# Patient Record
Sex: MALE | Race: WHITE | NOT HISPANIC OR LATINO | Employment: PART TIME | URBAN - NONMETROPOLITAN AREA
[De-identification: names, ages, dates, MRNs, and addresses within clinical notes are randomized per-mention and may not be internally consistent; named-entity substitution may affect disease eponyms.]

---

## 2017-04-05 ENCOUNTER — COMMUNICATION - GICH (OUTPATIENT)
Dept: FAMILY MEDICINE | Facility: OTHER | Age: 47
End: 2017-04-05

## 2017-04-05 DIAGNOSIS — M51.26 OTHER INTERVERTEBRAL DISC DISPLACEMENT, LUMBAR REGION: ICD-10-CM

## 2017-04-05 DIAGNOSIS — K59.01 SLOW TRANSIT CONSTIPATION: ICD-10-CM

## 2017-10-19 ENCOUNTER — COMMUNICATION - GICH (OUTPATIENT)
Dept: FAMILY MEDICINE | Facility: OTHER | Age: 47
End: 2017-10-19

## 2017-10-19 ENCOUNTER — OFFICE VISIT - GICH (OUTPATIENT)
Dept: FAMILY MEDICINE | Facility: OTHER | Age: 47
End: 2017-10-19

## 2017-10-19 ENCOUNTER — HISTORY (OUTPATIENT)
Dept: FAMILY MEDICINE | Facility: OTHER | Age: 47
End: 2017-10-19

## 2017-10-19 DIAGNOSIS — Z72.0 TOBACCO USE: ICD-10-CM

## 2017-10-19 DIAGNOSIS — K59.01 SLOW TRANSIT CONSTIPATION: ICD-10-CM

## 2017-10-19 DIAGNOSIS — K40.20 BILATERAL INGUINAL HERNIA WITHOUT OBSTRUCTION OR GANGRENE: ICD-10-CM

## 2017-10-19 DIAGNOSIS — M51.26 OTHER INTERVERTEBRAL DISC DISPLACEMENT, LUMBAR REGION: ICD-10-CM

## 2017-10-19 DIAGNOSIS — Z23 ENCOUNTER FOR IMMUNIZATION: ICD-10-CM

## 2017-10-19 LAB
ABSOLUTE BASOPHILS - HISTORICAL: 0 THOU/CU MM
ABSOLUTE EOSINOPHILS - HISTORICAL: 0.3 THOU/CU MM
ABSOLUTE IMMATURE GRANULOCYTES(METAS,MYELOS,PROS) - HISTORICAL: 0.1 THOU/CU MM
ABSOLUTE LYMPHOCYTES - HISTORICAL: 2.2 THOU/CU MM (ref 0.9–2.9)
ABSOLUTE MONOCYTES - HISTORICAL: 0.7 THOU/CU MM
ABSOLUTE NEUTROPHILS - HISTORICAL: 9.6 THOU/CU MM (ref 1.7–7)
ANION GAP - HISTORICAL: 7 (ref 5–18)
BASOPHILS # BLD AUTO: 0.2 %
BUN SERPL-MCNC: 13 MG/DL (ref 7–25)
BUN/CREAT RATIO - HISTORICAL: 13
CALCIUM SERPL-MCNC: 9.6 MG/DL (ref 8.6–10.3)
CHLORIDE SERPLBLD-SCNC: 102 MMOL/L (ref 98–107)
CO2 SERPL-SCNC: 26 MMOL/L (ref 21–31)
CREAT SERPL-MCNC: 0.99 MG/DL (ref 0.7–1.3)
EOSINOPHIL NFR BLD AUTO: 2 %
ERYTHROCYTE [DISTWIDTH] IN BLOOD BY AUTOMATED COUNT: 13 % (ref 11.5–15.5)
GFR IF NOT AFRICAN AMERICAN - HISTORICAL: >60 ML/MIN/1.73M2
GLUCOSE SERPL-MCNC: 330 MG/DL (ref 70–105)
HCT VFR BLD AUTO: 43.7 % (ref 37–53)
HEMOGLOBIN: 14.9 G/DL (ref 13.5–17.5)
IMMATURE GRANULOCYTES(METAS,MYELOS,PROS) - HISTORICAL: 0.4 %
LYMPHOCYTES NFR BLD AUTO: 17.4 % (ref 20–44)
MCH RBC QN AUTO: 29.6 PG (ref 26–34)
MCHC RBC AUTO-ENTMCNC: 34.1 G/DL (ref 32–36)
MCV RBC AUTO: 87 FL (ref 80–100)
MONOCYTES NFR BLD AUTO: 5.2 %
NEUTROPHILS NFR BLD AUTO: 74.8 % (ref 42–72)
PLATELET # BLD AUTO: 333 THOU/CU MM (ref 140–440)
PMV BLD: 8.8 FL (ref 6.5–11)
POTASSIUM SERPL-SCNC: 4.8 MMOL/L (ref 3.5–5.1)
RED BLOOD COUNT - HISTORICAL: 5.03 MIL/CU MM (ref 4.3–5.9)
SODIUM SERPL-SCNC: 135 MMOL/L (ref 133–143)
WHITE BLOOD COUNT - HISTORICAL: 12.8 THOU/CU MM (ref 4.5–11)

## 2017-10-19 ASSESSMENT — ANXIETY QUESTIONNAIRES
3. WORRYING TOO MUCH ABOUT DIFFERENT THINGS: NEARLY EVERY DAY
7. FEELING AFRAID AS IF SOMETHING AWFUL MIGHT HAPPEN: NEARLY EVERY DAY
4. TROUBLE RELAXING: NOT AT ALL
2. NOT BEING ABLE TO STOP OR CONTROL WORRYING: NOT AT ALL
GAD7 TOTAL SCORE: 12
6. BECOMING EASILY ANNOYED OR IRRITABLE: NEARLY EVERY DAY
1. FEELING NERVOUS, ANXIOUS, OR ON EDGE: NEARLY EVERY DAY
5. BEING SO RESTLESS THAT IT IS HARD TO SIT STILL: NOT AT ALL

## 2017-10-19 ASSESSMENT — PATIENT HEALTH QUESTIONNAIRE - PHQ9: SUM OF ALL RESPONSES TO PHQ QUESTIONS 1-9: 17

## 2017-10-20 ENCOUNTER — HISTORY (OUTPATIENT)
Dept: FAMILY MEDICINE | Facility: OTHER | Age: 47
End: 2017-10-20

## 2017-10-20 ENCOUNTER — OFFICE VISIT - GICH (OUTPATIENT)
Dept: FAMILY MEDICINE | Facility: OTHER | Age: 47
End: 2017-10-20

## 2017-10-20 DIAGNOSIS — E11.65 TYPE 2 DIABETES MELLITUS WITH HYPERGLYCEMIA (H): ICD-10-CM

## 2017-10-20 DIAGNOSIS — R73.9 HYPERGLYCEMIA: ICD-10-CM

## 2017-10-20 LAB
ESTIMATED AVERAGE GLUCOSE: 194 MG/DL
GLUCOSE SERPL-MCNC: 231 MG/DL (ref 70–105)
HEMOGLOBIN A1C MONITORING (POCT) - HISTORICAL: 8.4 % (ref 4–6.2)

## 2017-10-25 ENCOUNTER — OFFICE VISIT - GICH (OUTPATIENT)
Dept: SURGERY | Facility: OTHER | Age: 47
End: 2017-10-25

## 2017-10-25 ENCOUNTER — HISTORY (OUTPATIENT)
Dept: SURGERY | Facility: OTHER | Age: 47
End: 2017-10-25

## 2017-10-25 ENCOUNTER — COMMUNICATION - GICH (OUTPATIENT)
Dept: SURGERY | Facility: OTHER | Age: 47
End: 2017-10-25

## 2017-10-25 DIAGNOSIS — K59.09 OTHER CONSTIPATION: ICD-10-CM

## 2017-10-25 DIAGNOSIS — K62.5 HEMORRHAGE OF ANUS AND RECTUM: ICD-10-CM

## 2017-10-25 DIAGNOSIS — K40.20 BILATERAL INGUINAL HERNIA WITHOUT OBSTRUCTION OR GANGRENE: ICD-10-CM

## 2017-10-31 ENCOUNTER — AMBULATORY - GICH (OUTPATIENT)
Dept: EDUCATION SERVICES | Facility: OTHER | Age: 47
End: 2017-10-31

## 2017-10-31 ENCOUNTER — HISTORY (OUTPATIENT)
Dept: PEDIATRICS | Facility: OTHER | Age: 47
End: 2017-10-31

## 2017-10-31 ENCOUNTER — OFFICE VISIT - GICH (OUTPATIENT)
Dept: PEDIATRICS | Facility: OTHER | Age: 47
End: 2017-10-31

## 2017-10-31 DIAGNOSIS — E11.65 TYPE 2 DIABETES MELLITUS WITH HYPERGLYCEMIA (H): ICD-10-CM

## 2017-10-31 DIAGNOSIS — Z23 ENCOUNTER FOR IMMUNIZATION: ICD-10-CM

## 2017-10-31 DIAGNOSIS — Z76.89 PERSONS ENCOUNTERING HEALTH SERVICES IN OTHER SPECIFIED CIRCUMSTANCES: ICD-10-CM

## 2017-10-31 DIAGNOSIS — E11.9 TYPE 2 DIABETES MELLITUS WITHOUT COMPLICATIONS (H): ICD-10-CM

## 2017-10-31 ASSESSMENT — ANXIETY QUESTIONNAIRES
2. NOT BEING ABLE TO STOP OR CONTROL WORRYING: NOT AT ALL
GAD7 TOTAL SCORE: 12
7. FEELING AFRAID AS IF SOMETHING AWFUL MIGHT HAPPEN: NEARLY EVERY DAY
3. WORRYING TOO MUCH ABOUT DIFFERENT THINGS: NEARLY EVERY DAY
5. BEING SO RESTLESS THAT IT IS HARD TO SIT STILL: NOT AT ALL
6. BECOMING EASILY ANNOYED OR IRRITABLE: NEARLY EVERY DAY
4. TROUBLE RELAXING: NOT AT ALL
1. FEELING NERVOUS, ANXIOUS, OR ON EDGE: NEARLY EVERY DAY

## 2017-10-31 ASSESSMENT — PATIENT HEALTH QUESTIONNAIRE - PHQ9: SUM OF ALL RESPONSES TO PHQ QUESTIONS 1-9: 17

## 2017-12-07 ENCOUNTER — OFFICE VISIT - GICH (OUTPATIENT)
Dept: PEDIATRICS | Facility: OTHER | Age: 47
End: 2017-12-07

## 2017-12-07 ENCOUNTER — HISTORY (OUTPATIENT)
Dept: PEDIATRICS | Facility: OTHER | Age: 47
End: 2017-12-07

## 2017-12-07 DIAGNOSIS — E11.65 TYPE 2 DIABETES MELLITUS WITH HYPERGLYCEMIA (H): ICD-10-CM

## 2017-12-07 ASSESSMENT — PATIENT HEALTH QUESTIONNAIRE - PHQ9: SUM OF ALL RESPONSES TO PHQ QUESTIONS 1-9: 17

## 2017-12-11 ENCOUNTER — SURGERY (OUTPATIENT)
Dept: SURGERY | Facility: OTHER | Age: 47
End: 2017-12-11

## 2017-12-12 ENCOUNTER — SURGERY (OUTPATIENT)
Dept: SURGERY | Facility: OTHER | Age: 47
End: 2017-12-12

## 2017-12-27 NOTE — PROGRESS NOTES
"Patient Information     Patient Name MRN Rick Pulido [id number] Male 1970      Progress Notes by Vilma Bowden MD at 10/20/2017  9:30 AM     Author:  Vilma Bowden MD Service:  (none) Author Type:  Physician     Filed:  10/20/2017 12:48 PM Encounter Date:  10/20/2017 Status:  Signed     :  Vilma Bowden MD (Physician)            SUBJECTIVE:  47 y.o. male returns for followup after being seen yesterday with an incidentally noted random blood glucose of 330. He used his father's supplies to check his BS at home with a value in the low 200s this morning. He has no previous Hx of diabetes, but his father has a long Hx of diabetes and patient is somewhat familiar with testing and medications. Patient has noted some increased thirst and \"eats a lot\" but has had gradual weight loss.       Current Outpatient Prescriptions       Medication  Sig Dispense Refill     meloxicam 15 mg tablet Take 1 tablet by mouth once daily. 90 tablet 0     polyethylene glycoL (MIRALAX) 17 gram/dose powder Take 17 g by mouth 2 times daily. 1054 g 1     varenicline (CHANTIX STARTING MONTH BOX) 0.5 mg (11)- 1 mg (42) tablet Take one 0.5mg tab daily for 3 days, then one 0.5mg tab twice daily for 4 days, then one 1mg tab twice daily. 1 Packet 0     varenicline (CHANTIX) 1 mg tablet Take 1 mg by mouth 2 times daily with meals. 60 tablet 2     No current facility-administered medications for this visit.      Medications have been reviewed by me and are current to the best of my knowledge and ability.      Allergies as of 10/20/2017      (No Known Allergies)        OBJECTIVE:  /68  Pulse 80  Wt 59.2 kg (130 lb 9.6 oz)  BMI 19.86 kg/m2    General Appearance: alert, thin, does not appear acutely ill.    LAB:  Results for orders placed or performed in visit on 10/20/17      GLUCOSE, RANDOM      Result  Value Ref Range    GLUCOSE,RANDOM 231 (H) 70 - 105 mg/dL   HEMOGLOBIN A1C MONITORING (POCT)      Result  Value " Ref Range    HEMOGLOBIN A1C MONITORING (POCT) 8.4 (H) 4.0 - 6.2 %    ESTIMATED AVERAGE GLUCOSE  194 mg/dL       ASSESSMENT/PLAN:    ICD-10-CM   1. Elevated blood sugar R73.9   2. Type 2 diabetes mellitus with hyperglycemia, without long-term current use of insulin (HC) E11.65       Reviewed treatment recommendations and will start metformin 500 mg daily increasing to 500 mg BID, then follow up.  Referral done for Diabetes Education.  Will need followup and further evaluation before surgery is scheduled for inguinal hernia repair.

## 2017-12-27 NOTE — PROGRESS NOTES
Patient Information     Patient Name MRN Rick Pulido [id number] Male 1970      Progress Notes by Adebayo Dubois MD at 10/31/2017  2:00 PM     Author:  Adebayo Dubois MD Service:  (none) Author Type:  Physician     Filed:  10/31/2017  4:21 PM Encounter Date:  10/31/2017 Status:  Signed     :  Adebayo Dubois MD (Physician)            Subjective  Rick Contreras is a 47 y.o. male who presents for [facility] primary care. Previous physician was Dr. Colón. He was recently diagnosed with diabetes by Dr. Bowden. Seen by Dr. Calzada for a consult regarding inguinal hernia repair and referred to me for [facility] care. He was drinking lots of regular soda and Yeison-Aid and eating candy. He's ready to make lifestyle modifications. His parents have diabetes so he feels like he knows a lot about it. Home blood sugars range from 198-240 on metformin 500 mg twice a day. He's going to diabetes education after our visit today. He has a hernia that he would like to have repaired. He also has chronic degenerative disc disease in his back. His last physical therapy was 2 years ago. He is a current every day tobacco user and is ready to quit with Chantix. He's tried it before and never had side effects including no nightmares. He doesn't get any chest pain, but does occasionally get heartburn.    Problem List/PMH: reviewed in EMR, and made relevant updates today.  Medications: reviewed in EMR, and made relevant updates today.  Allergies: reviewed in EMR, and made relevant updates today.    Social Hx:  Social History        Substance Use Topics          Smoking status:   Current Every Day Smoker      Packs/day:  0.50      Years:  20.00      Types:  Cigarettes      Last attempt to quit:  2015      Smokeless tobacco:   Never Used       Comment: has chantix       Alcohol use   0.6 oz/week     1 Standard drinks or equivalent per week        Comment: rare       Social History Narrative    Unemployed      I  "reviewed social history and made relevant updates today.    Family Hx:   Family History       Problem   Relation Age of Onset     Diabetes  Mother      Diabetes  Father      Cancer  Father      Skin, throat         Objective  Vitals: reviewed in EMR.  /74  Pulse 82  Temp 96.7  F (35.9  C) (Tympanic)   Ht 1.676 m (5' 6\")  Wt 59.4 kg (131 lb)  BMI 21.14 kg/m2    Gen: Pleasant male, NAD.  HEENT: MMM, no OP erythema.   Neck: Supple  CV: RRR no m/r/g.   Pulm: CTAB no w/r/r  Neuro: Grossly intact  Msk: No lower extremity edema.  Skin: No concerning lesions.  Psychiatric: Normal affect and insight. Does not appear anxious or depressed.    Diabetes Labs  Lab Results      Component  Value Date/Time    HGBA1C 8.4 (H) 10/20/2017 10:05 AM    CREATININE 0.99 10/19/2017 10:45 AM         Assessment    ICD-10-CM    1. Diabetes mellitus type 2 in nonobese (HC) E11.9 aspirin (ECOTRIN) 81 mg enteric coated tablet      lisinopril (PRINIVIL; ZESTRIL) 2.5 mg tablet      simvastatin (ZOCOR) 20 mg tablet   2. Type 2 diabetes mellitus with hyperglycemia, without long-term current use of insulin (HC) E11.65 metFORMIN (GLUCOPHAGE) 500 mg tablet   3. Need for vaccination Z23 PNEUMOCOCCAL VACCINE 23-VALENT => 1 YO IM   4. Encounter to establish care Z76.89        We had a lengthy discussion today with regards to medication management of diabetes mellitus type 2. Applauded his ability to work on lifestyle modification. Appreciate the assistance of diabetes education. Since his blood sugars remain in the 200s I like to see him in about a month after he's made some healthy dietary modification, increase metformin dosage and if sugars are better controlled would do a preop visit at that time.    Plan   -- Expected clinical course discussed   -- Medications and their side effects discussed  Patient Instructions      -- Choose a quit date (within 1 month). Quitting smoking abruptly is more successful than gradually cutting back.   -- Tell " everyone about it (friends, family, coworkers)   -- Think about when you smoke the most, and what you'll do during those times (eg when in the car, work breaks, etc)   -- Start varenicline (Chantix) 1 week before your quit date   -- Stop smoking on quit date   -- Quit Plan   [phone] ([id number])   http://www.quitplan.[employer]   -- http://smokefree.gov/    Aspects of Diabetes we can improve:  Hemoglobin A1c Lab Results   Component Value Date/Time    HGBA1C 8.4 (H) 10/20/2017 10:05 AM    Goal range is under 8. Best is 6.5 to 7   Blood Pressure BP Readings from Last 1 Encounters:   10/31/17 100/74    Goal to keep less than 140/90   Tobacco  reports that he has been smoking Cigarettes.  He has a 10.00 pack-year smoking history. He has never used smokeless tobacco. Goal to abstain from tobacco   Aspirin Start aspirin 81 mg daily Aspirin reduces risk of heart disease and stroke   ACE/ARB Start lisinopril 2.5 mg daily These medications reduce risk of kidney disease   Cholesterol Start simvastatin 20 mg daily Statins reduce risk of heart disease and stroke   Eye Exam schedule Annual diabetic eye exam   Healthy weight Body mass index is 21.14 kg/(m^2). Goal BMI under 30, best is under 25.      -- I'm trying to exercise daily (goal at least 20 min/day) with moderate aerobic activity   -- Eat healthy (resources from ADA at http://www.diabetes.org/)   -- I'm taking good care of my feet. Consider seeing the Podiatrist   -- Check blood sugars as directed, record in log book and bring to every appointment   -- Increase metformin to 1000 mg twice daily   -- Next diabetes lab draw: 3 months   -- Next diabetes office visit: 1 month      Return in about 1 month (around 11/30/2017) for diabetes & preop.    Signed, Adebayo Dubois MD  Internal Medicine & Pediatrics

## 2017-12-27 NOTE — PROGRESS NOTES
Patient Information     Patient Name MRN Rick Pulido [id number] Male 1970      Progress Notes by Immanuel Calzada MD at 10/25/2017  9:50 AM     Author:  Immanuel Calzada MD Service:  (none) Author Type:  Physician     Filed:  10/25/2017 10:26 AM Encounter Date:  10/25/2017 Status:  Signed     :  Immanuel Calzada MD (Physician)            Surgical Clinic Consult  Referring physician:  Vilma Bowden MD   Primary physician:     Dr Dubois    Chief complaint:   Bilateral inguinal herniae    History of present illness:  This is a 47 y.o. male I am seeing in consultation for bilateral inguinal herniae. The patient has noticed a bulge in the right groin for several years, but it is increasing in size. Also has a smaller bulge on the left. He has disc disease and so does not do much lifting. He does have chronic constipation and has some rectal bleeding when wiping intermittently. He has never had colonoscopy. He is a newly diagnosed diabetic. He is just been started on metformin and is scheduled to see the dietitian and Dr. Dubois.     Past medical history:   Past Medical History:     Diagnosis  Date     Chronic constipation      Degenerative disk disease        Past surgical history:  Past Surgical History:      Procedure  Laterality Date     VASECTOMY       WISDOM TEETH EXTRACTION         Current medications:  Current Outpatient Prescriptions       Medication  Sig Dispense Refill     bisacodyl (DULCOLAX) 5 mg tablet Take 2 tablets by mouth one time for 1 dose. 2 tablet 0     meloxicam 15 mg tablet Take 1 tablet by mouth once daily. 90 tablet 0     metFORMIN (GLUCOPHAGE) 500 mg tablet Take 1 tablet by mouth 2 times daily with meals. 60 tablet 0     polyethylene glycoL (MIRALAX) 17 gram/dose powder Take 17 g by mouth 2 times daily. 1054 g 1     polyethylene glycol-electrolyte (NULYTELY WITH FLAVOR PACKS) 420 gram solution Take 240 mL by mouth every 10 minutes. 4000 mL 0     varenicline (CHANTIX  "STARTING MONTH BOX) 0.5 mg (11)- 1 mg (42) tablet Take one 0.5mg tab daily for 3 days, then one 0.5mg tab twice daily for 4 days, then one 1mg tab twice daily. 1 Packet 0     varenicline (CHANTIX) 1 mg tablet Take 1 mg by mouth 2 times daily with meals. 60 tablet 2     No current facility-administered medications for this visit.      Medications have been reviewed by me and are current to the best of my knowledge and ability.      Allergies:  No Known Allergies    Family history:  Family History       Problem   Relation Age of Onset     Diabetes  Mother      Diabetes  Father      Cancer  Father      Skin, throat         Social history:  Social History     Social History        Marital status:       Spouse name: N/A     Number of children:  N/A     Years of education:  N/A     Occupational History      Not on file.     Social History Main Topics          Smoking status:   Current Every Day Smoker      Packs/day:  0.50      Years:  20.00      Types:  Cigarettes      Last attempt to quit:  8/30/2015      Smokeless tobacco:   Never Used       Comment: has chantix       Alcohol use   0.6 oz/week     1 Standard drinks or equivalent per week        Comment: rare       Drug use:   No      Sexual activity:   Not on file      Other Topics  Concern     Not on file      Social History Narrative       Review of systems:  COMPLETE REVIEW OF SYSTEMS: Weight loss  Gastrointestinal: Constipation, change in bowel habits, bright red blood per rectum, gas bloating, indigestion and abdominal pain  Cardiovascular: Denies problems  Respiratory: Denies problems  Genitourinary denies problems   Musculoskeletal: Joint and muscle pain. Chronic back pain.  Skin: Denies problems  Neurologic: Frequent headaches  Psychiatric: Depression  Endocrine: New onset diabetes  Heme/Lymphatic: Denies problems  Vascular: Denies problems      Physical exam: /76  Ht 1.676 m (5' 6\")  Wt 59.5 kg (131 lb 2 oz)  BMI 21.16 kg/m2      General: this " is a pleasant, thin male patient in no acute distress.  Patient is awake alert and oriented x3 .     Abdominal: Easily visible bulge on the right when standing. Small bulge on the left. Both reducible when supine.     Assessment:   1. Bilateral inguinal herniae  2. Constipation and bright red blood per rectum  3. New-onset diabetes    Plan:    1. Bilateral inguinal hernia repair under general anesthesia as a day surgery. Patient understands the risks to include bleeding, infection, recurrence, the use of prosthetic mesh, potential injury to the cord or structures resulting in numbness or loss of testicle and wishes to proceed. We discussed open and laparoscopic repairs and we will proceed open.  2. Colonoscopy. The patient understands the risks to include bleeding, perforation, potential incomplete examination and wishes to proceed.  3. Postpone both the above until his diabetes is under control. Pre-op with Dr Dubois.      Immanuel Calzada MD FACS

## 2017-12-28 ENCOUNTER — HISTORY (OUTPATIENT)
Dept: PEDIATRICS | Facility: OTHER | Age: 47
End: 2017-12-28

## 2017-12-28 ENCOUNTER — OFFICE VISIT - GICH (OUTPATIENT)
Dept: PEDIATRICS | Facility: OTHER | Age: 47
End: 2017-12-28

## 2017-12-28 DIAGNOSIS — E11.65 TYPE 2 DIABETES MELLITUS WITH HYPERGLYCEMIA (H): ICD-10-CM

## 2017-12-28 ASSESSMENT — ANXIETY QUESTIONNAIRES
1. FEELING NERVOUS, ANXIOUS, OR ON EDGE: NEARLY EVERY DAY
GAD7 TOTAL SCORE: 12
3. WORRYING TOO MUCH ABOUT DIFFERENT THINGS: NEARLY EVERY DAY
4. TROUBLE RELAXING: NOT AT ALL
6. BECOMING EASILY ANNOYED OR IRRITABLE: NEARLY EVERY DAY
7. FEELING AFRAID AS IF SOMETHING AWFUL MIGHT HAPPEN: NEARLY EVERY DAY
2. NOT BEING ABLE TO STOP OR CONTROL WORRYING: NOT AT ALL
5. BEING SO RESTLESS THAT IT IS HARD TO SIT STILL: NOT AT ALL

## 2017-12-28 ASSESSMENT — PATIENT HEALTH QUESTIONNAIRE - PHQ9: SUM OF ALL RESPONSES TO PHQ QUESTIONS 1-9: 17

## 2017-12-28 NOTE — PATIENT INSTRUCTIONS
Patient Information     Patient Name MRN Rick Pulido [id number] Male 1970      Patient Instructions by Vilma Bowden MD at 10/20/2017  9:30 AM     Author:  Vilma Bowden MD Service:  (none) Author Type:  Physician     Filed:  10/20/2017 11:04 AM Encounter Date:  10/20/2017 Status:  Signed     :  Vilma Bowden MD (Physician)            Start metformin once daily. After a few days, increase to twice daily.  Your dose will be increased further (probably) at your followup appointment.  You need to see Diabetes Education to get a meter and strips and review diabetic diet and other lifestyle recommendations.

## 2017-12-28 NOTE — TELEPHONE ENCOUNTER
Patient Information     Patient Name MRN Rick Pulido [id number] Male 1970      Telephone Encounter by Herlinda Bonilla at 10/25/2017 12:42 PM     Author:  Herlinda Bonilla Service:  (none) Author Type:  (none)     Filed:  10/25/2017 12:44 PM Encounter Date:  10/25/2017 Status:  Signed     :  Herlinda Bonilla            Stating that he got his dates and surgery packet.  He will have surgery on  and his colonoscopy on 17.  Herlinda Bonilla LPN..........10/25/2017  12:44 PM

## 2017-12-28 NOTE — TELEPHONE ENCOUNTER
Patient Information     Patient Name MRN Rick Pulido [id number] Male 1970      Telephone Encounter by Luzma Green at 10/25/2017  1:42 PM     Author:  Luzma Green Service:  (none) Author Type:  (none)     Filed:  10/25/2017  1:46 PM Encounter Date:  10/25/2017 Status:  Signed     :  Luzma Green            Screening Questions for the Scheduling of Screening Colonoscopies   (If Colonoscopy is diagnostic, Provider should review the chart before scheduling.)  Are you younger than 50 or older than 80?  YES   Do you take aspirin or fish oil?  NO  (if yes, tell patient to stop 1 week prior to Colonoscopy)  Do you take warfarin (Coumadin), clopidogrel (Plavix), apixaban (Eliquis), dabigatram (Pradaxa), rivaroxaban (Xarelto) or any blood thinner? NO   Do you use oxygen at home?  NO  Do you have kidney disease? NO  Are you on dialysis? NO   Have you had a stroke or heart attack in the last year? NO  Have you had a stent in your heart or any blood vessel in the last year? NO  Have you had a transplant of any organ? NO   Have you had a colonoscopy or upper endoscopy (EGD) before? NO         When?  NO   Date of scheduled Colonoscopy. 2017  Provider JODI   Pharmacy [facility]

## 2017-12-28 NOTE — PROGRESS NOTES
Patient Information     Patient Name MRN Rick Pulido [id number] Male 1970      Progress Notes by Michelle Crow RN at 10/31/2017  3:00 PM     Author:  Michelle Crow RN Service:  (none) Author Type:  NURS- Registered Nurse     Filed:  10/31/2017  5:05 PM Encounter Date:  10/31/2017 Status:  Signed     :  Michelle Crow RN (NURS- Registered Nurse)            Diabetes Education Progress Note  1:1 New Diagnosis    Visit included: Diabetes Disease Process, Overview of Diabetes, Nutrition/Carbohydrate counting, Physical Activity, Diabetes Medications, Glucose Monitoring, Target BGs, Hyperglycemia/Hypoglycemia, Goal Setting, Chronic Complications and Optimal Diabetes Care    SUBJECTIVE: Rick Contreras is a 47 y.o. male who has type 2 diabetes and is here for his newly diagnosed diabetes one on one visit.  Family history positive for diabetes in the patient's Step-Mother and Father.     OBJECTIVE:  There were no vitals taken for this visit.     Current diabetes medications:  Metformin 1000 mg one tab twice daily.  Pt's current physical activity habits include walking for 5 - 10 minutes once a day.  Patient shares he is more active in the summer than in the winter.    ASSESSMENT:   Patient newly diagnosed DM2.  Discussed pathophysiology with interpretation and meaning of HgA1c.  Stressed importance of testing BG daily to help keep tight control of DM2, helping to minimize risk for possible complications of uncontrolled diabetes.  Discussed benefits of keeping A1c under 7% and encouraged patient to begin testing BG daily.    Discussed exercise and weight loss.  Patient states he has lost weight recently, has cut out SSB and sugary snacks.  He notes he cannot exercise for extended periods of time due to chronic back pain.  Recommend low to moderate exercise, such as walking, working up to a minimum of 30 minutes most days, as tolerated.      Spoke about diet management with CHO counting.   Introduced label reading.  Pt currently eats 3 meals per day.  Patient shares he is always hungry, but tries to choose healthy snacks, low CHO, low fat.  Recommend carbohydrate counting, 4 choices per meal, 0 - 1  choices per snack (limiting snacks to help with tighter BG control).  .     Instructed pt on OneTouch Verio Flex meter. Pt states he knows how to SMBG and has used his dad's BG meter.  He states he has not tested his BG yet today.  Patient requests to test up to 4 times daily.  Prescriptions for testing supplies electronically submitted to pharmacy.  Recommend patient schedule follow up visit, as needed, for BG assessment and continued DBED.  BG target range (mg/dl): pre-meal: , 2 hr pp less than 180.    Educational Handouts Given:  My Food Plan, A1c flier, Activity Pyramid, Tips on SMBG, Diabetes Success Plan, DSMS sheet.     Patient did verbalize understanding of education as evidenced by stating need to continue testing BG up to 4 x daily to see if current treatment plan is adequate for DM2 control.     Patient  did demonstrate understanding of education today as evidenced by goal setting.       PLAN:  1. Patient will check his BG four times a day and after BG under tighter control, decrease to 1 - 2 times daily.  Patient to begin carbohydrate counting, 4 choices per meal, 0 - 1  choices per snack (limiting snacks to help with tighter BG control).  Recommend low to moderate activity (such as walking) on a daily basis, as tolerated.    Self-Directed Behavior Goal/s set by patient:  (1) Count carbohydrates at most of my meals and snacks.       Time spent with patient was 60 minutes.    Michelle Crow RN, BSN, CDE  10/31/2017 3:19 PM        DIABETES SELF-MANAGEMENT SUPPORT (DSMS) PLAN    The patient has attended diabetes education sessions. The patient has received a copy of the below DSMS and has chosen to use the following as resources to help manage their diabetes.     DSMS Plan:  (1)  Check out  web based programs.       The following are resources that will help you manage your diabetes.   Let your educator know if you need help accessing the websites.    Emotional Support  Diabetes Support Group: Sponsored by [facility] & [facility]    Meets the 4th Thursday of the month at 6:30    [facility] [address]  Other: ______________________________________________________________________  Weight Management   Weight Watchers     Meets Mondays 9:30, 11:45, or 5:30    Edvin Swan, [address] [place], [place]     Contact Maura [phone] [email or web address]    Weight Watchers www. weightwatchers.com    My Fitness Pal    [facility] [place] [facility] [employer] or download the from [place]  Other: ______________________________________________________________________    Exercise   [address]  [phone]  Walking/Biking Trails    Get Fit [place]  [email or web address]  Other: ______________________________________________________________________    Stress Relief    Yoga    [place], [address] [place], [phone]    [address] [phone]  Other: ______________________________________________________________________  Journals     Diabetes Forecast- 285.987.2636- www.diabetesforecast.org     Diabetes Self-Management- 681.903.1900- www.diabetesselfmanagement.com     Apps/Web Based Programs    My Fitness Pal  myfitnesspal.com    Glucose Dorian glucosebuddy.com  (Free, tracks blood glucose, graphs)     Emeals  emeals.com  (weekly meal plans)    Fooducate.com   (for iphone, ipad, ipod touch, android, & web)    CalorieKing.com  (for iphone, ipad, ipod touch, android, blackberry, & web)    SparkPeople.com  (free tools, resources & support for weight loss, calorie counter, fitness              plans & videos, recipes, etc)    Signed: Patient Name: ________________________________ Date________________________   Communicated to  referring provider: _______________________________

## 2017-12-28 NOTE — PROGRESS NOTES
"Patient Information     Patient Name MRN Rick Pulido [id number] Male 1970      Progress Notes by Vilma Bowden MD at 10/19/2017  9:45 AM     Author:  Vilma Bowden MD  Service:  (none) Author Type:  Physician     Filed:  2017  6:37 PM  Encounter Date:  10/19/2017 Status:  Addendum     :  Vilma Bowden MD (Physician)        Related Notes: Original Note by Vilma Bowden MD (Physician) filed at 10/19/2017  6:24 PM            SUBJECTIVE:  47 y.o. male presents for worsening R inguinal hernia. He states that he has had this for several years, has always been able to reduce it, but now finds it is more persistent and painful. Still able to reduce the hernia but it \"pops right back out again\" and is uncomfortable.   He is going through a disability process for degenerative disc disease, not currently working. Taking meloxicam daily for pain and requests a refill.  He would also like to discuss smoking cessation. Smokes 1/2-1 ppd of cigarettes daily and has done so since age 21. Has taken Chantix in the past and found it effective; smoked smoking for about 6 months, of which 3 are without medication.     REVIEW OF SYSTEMS:    Constitutional: has noticed gradual weight loss        Current Outpatient Prescriptions       Medication  Sig Dispense Refill     meloxicam 15 mg tablet TAKE ONE TABLET BY MOUTH ONCE DAILY 90 tablet 0     polyethylene glycoL (MIRALAX) 17 gram/dose powder Take 17 g by mouth 2 times daily. 2 jar 1     No current facility-administered medications for this visit.      Medications have been reviewed by me and are current to the best of my knowledge and ability.      Allergies as of 10/19/2017      (No Known Allergies)      Past Medical History:     Diagnosis  Date     Chronic constipation      Degenerative disk disease      Social History        Substance Use Topics          Smoking status:   Current Every Day Smoker      Packs/day:  0.50      Years:  20.00      " Types:  Cigarettes      Last attempt to quit:  8/30/2015      Smokeless tobacco:   Never Used      Alcohol use   0.6 oz/week     1 Standard drinks or equivalent per week        Comment: rare         OBJECTIVE:  /90  Pulse 76  Temp 97.3  F (36.3  C) (Tympanic)   Wt 60.3 kg (133 lb)  BMI 20.23 kg/m2    General Appearance: alert, pale, thin middle-aged man who moves about with some apparent discomfort  Chest/Respiratory Exam: Normal chest wall and respirations. Clear to auscultation.  Cardiovascular Exam: Regular rate and rhythm. S1, S2, no murmur, click, gallop, or rubs.  Gastrointestinal Exam: Soft, nontender, with bilateral, tender, mild prominence inguinally. More apparent standing.  Genitourinary Exam Male: normal circumcised penis, no scrotal mass    LAB: pending    ASSESSMENT/PLAN:    ICD-10-CM   1. Non-recurrent bilateral inguinal hernia without obstruction or gangrene K40.20   2. Herniated lumbar intervertebral disc M51.26   3. Slow transit constipation K59.01   4. Tobacco use Z72.0   5. Need for immunization with diphtheria, tetanus, and poliovirus vaccine Z23   6. Need for influenza vaccination Z23     Surgery referral done for the B inguinal hernias.  Patient is due for labs and will obtain CMP and CBC.   Medications refilled. Prescription for Chantix given with review of dose titration, stopping smoking, and cautions.  TDaP and flu shot today.     Reviewed available options for smoking cessation. Patient chose to use Chantix. Discussed appropriate use of medication. Performed tobacco cessation counseling for greater than 3 minutes.

## 2017-12-28 NOTE — TELEPHONE ENCOUNTER
Patient Information     Patient Name MRN Rick Pulido [id number] Male 1970      Telephone Encounter by Vilma Bowden MD at 10/19/2017  5:14 PM     Author:  Vilma Bowden MD Service:  (none) Author Type:  Physician     Filed:  10/19/2017  5:17 PM Encounter Date:  10/19/2017 Status:  Signed     :  Vilma Bowden MD (Physician)            Called patient with blood sugar result (330) with no prior Hx of diabetes. He lives with his father who does have diabetes and has checked his own glucose at home using his dad's meter; in the past has always been under 110. Has noticed increased thirst. Has lost 10# over about 2 years.   Given option of going to [facility] or returning to clinic tomorrow. He will call in the morning for a same-day appointment and will obtain repeat blood sugar and A1c. Depending on results, most likely will be able to start an oral agent and set up diabetes education.

## 2017-12-28 NOTE — TELEPHONE ENCOUNTER
Patient Information     Patient Name MRN Rick Pulido [id number] Male 1970      Telephone Encounter by Kerry Jackson RN at 10/19/2017  2:45 PM     Author:  Kerry Jackson RN Service:  (none) Author Type:  NURS- Registered Nurse     Filed:  10/19/2017  2:49 PM Encounter Date:  10/19/2017 Status:  Signed     :  Kerry Jackson RN (NURS- Registered Nurse)            [facility] received unclear qty for miralax. Per chart review, prescription was for 2 jars with 1 refill. Converted to grams and resent.    Kerry Jackson RN........10/19/2017 2:46 PM

## 2017-12-28 NOTE — PATIENT INSTRUCTIONS
Patient Information     Patient Name MRRick Valencia [id number] Male 1970      Patient Instructions by Michelle Crow RN at 10/31/2017  3:00 PM     Author:  Michelle Crow RN Service:  (none) Author Type:  NURS- Registered Nurse     Filed:  10/31/2017  3:19 PM Encounter Date:  10/31/2017 Status:  Signed     :  Michelle Crow RN (NURS- Registered Nurse)            Diabetes Goals and Reminders  Your A1c test should be done every 3 months.  Your goal is less than 7%.   Your last result is:  HEMOGLOBIN A1C MONITORING (POCT) (%)    Date Value   10/20/2017 8.4 (H)       Your LDL cholesterol test should be done at least once a year.    Your last result is:  No results found for: LDLCHOL    Have Blood pressure and weight checked every three months.  Your blood pressure goal is 140/90 or less.  Your last blood pressure reading was: 100/74    Test your blood sugar 4 times per day.  Your home blood glucose target ranges are:  Before meals:   2 hours after a meal: Less than 180  Bedtime 100 - 140 mg/dl.       Avoid all tobacco.  Follow your healthy diet and exercise plan.  See the eye doctor every year.  See the dentist every six months.  Have kidney function tested yearly.    Take all medicine as prescribed.  Please let me know if you are having symptoms you don t expect or if you wish to stop any medicine.    Follow Up Plan  Please call or visit Diabetes Education if blood sugars are consistently out of target.  Your future lab plan is:  HgA1c in mid-2018.    If you need your cholesterol checked at your next appointment, you should fast 8 to 10 hours before your appointment.  Do not eat or drink anything besides water.  Drink plenty of water and take all your usual medicine.    SUMMARY FOR TODAY'S VISIT    1.  Begin testing blood sugar 4 time(s) daily, as directed.  Once we see the blood sugar patterns, you can decrease testing to once or twice daily.    2.  Begin carbohydrate  counting, 4 choices per meal, 0 - 1 choice per snack (limiting snacks to help with weight loss and tighter blood sugar control).     3.  Recommend low to moderate exercise, such as walking, working up to a minimum of 30 minutes most days, as tolerated.     4.  Follow-up for continued diabetes education, as needed.  Consider attending the Diabetes BASICS class, Wednesday, November 22nd , from 9am - 11am, at the [facility].  If you would like to attend, please call [phone] or [phone].       Michelle Crow RN, BSN, CDE  10/31/2017 3:13 PM

## 2017-12-29 NOTE — PATIENT INSTRUCTIONS
Patient Information     Patient Name MRN Rick Pulido [id number] Male 1970      Patient Instructions by Vilma Bowden MD at 10/19/2017  9:45 AM     Author:  Vilma Bowden MD Service:  (none) Author Type:  Physician     Filed:  10/19/2017 10:10 AM Encounter Date:  10/19/2017 Status:  Signed     :  Vilma Bowden MD (Physician)            Consider making an appointment for your pre-operative evaluation with Dr. Adebayo Dubois.  Start Chantix when ready. Stop smoking 1-2 weeks later.

## 2017-12-29 NOTE — PATIENT INSTRUCTIONS
Patient Information     Patient Name MRRick Valencia [id number] Male 1970      Patient Instructions by Adebayo Dubois MD at 10/31/2017  2:00 PM     Author:  Adebayo Dubois MD  Service:  (none) Author Type:  Physician     Filed:  10/31/2017  2:28 PM  Encounter Date:  10/31/2017 Status:  Addendum     :  Adebayo Dubois MD (Physician)        Related Notes: Original Note by Adebayo Dubois MD (Physician) filed at 10/31/2017  2:21 PM             -- Choose a quit date (within 1 month). Quitting smoking abruptly is more successful than gradually cutting back.   -- Tell everyone about it (friends, family, coworkers)   -- Think about when you smoke the most, and what you'll do during those times (eg when in the car, work breaks, etc)   -- Start varenicline (Chantix) 1 week before your quit date   -- Stop smoking on quit date   -- Quit Plan   [phone] ([id number])   http://www.quitplan.com   -- http://smokefree.gov/    Aspects of Diabetes we can improve:  Hemoglobin A1c Lab Results   Component Value Date/Time    HGBA1C 8.4 (H) 10/20/2017 10:05 AM    Goal range is under 8. Best is 6.5 to 7   Blood Pressure BP Readings from Last 1 Encounters:   10/31/17 100/74    Goal to keep less than 140/90   Tobacco  reports that he has been smoking Cigarettes.  He has a 10.00 pack-year smoking history. He has never used smokeless tobacco. Goal to abstain from tobacco   Aspirin Start aspirin 81 mg daily Aspirin reduces risk of heart disease and stroke   ACE/ARB Start lisinopril 2.5 mg daily These medications reduce risk of kidney disease   Cholesterol Start simvastatin 20 mg daily Statins reduce risk of heart disease and stroke   Eye Exam schedule Annual diabetic eye exam   Healthy weight Body mass index is 21.14 kg/(m^2). Goal BMI under 30, best is under 25.      -- I'm trying to exercise daily (goal at least 20 min/day) with moderate aerobic activity   -- Eat healthy (resources from ADA at  http://www.diabetes.org/)   -- I'm taking good care of my feet. Consider seeing the Podiatrist   -- Check blood sugars as directed, record in log book and bring to every appointment   -- Increase metformin to 1000 mg twice daily   -- Next diabetes lab draw: 3 months   -- Next diabetes office visit: 1 month

## 2017-12-30 NOTE — NURSING NOTE
Patient Information     Patient Name MRN Rick Pulido [id number] Male 1970      Nursing Note by Herlinda Bonilla at 10/25/2017  9:50 AM     Author:  Herlinda Bonilla Service:  (none) Author Type:  (none)     Filed:  10/25/2017  9:45 AM Encounter Date:  10/25/2017 Status:  Signed     :  Herlinda Bonilla            Here today in consultation for possible bilateral hernias.  Herlinda Bonilla LPN..........10/25/2017  9:42 AM

## 2017-12-30 NOTE — NURSING NOTE
Patient Information     Patient Name MRRick Valencia [id number] Male 1970      Nursing Note by Anu Abel at 10/19/2017  9:45 AM     Author:  Anu Abel Service:  (none) Author Type:  (none)     Filed:  10/19/2017  9:35 AM Encounter Date:  10/19/2017 Status:  Signed     :  Anu Abel            Patient is here today with c/o a lump, which he says is a hernia in his  right groin region, he would also like a RX for Chantix to quit smoking.  Anu Abel LPN......................10/19/2017 9:26 AM

## 2017-12-30 NOTE — NURSING NOTE
Patient Information     Patient Name MRN Rick Pulido [id number] Male 1970      Nursing Note by Denice Camargo at 10/31/2017  2:00 PM     Author:  Denice Camargo Service:  (none) Author Type:  (none)     Filed:  10/31/2017  2:11 PM Encounter Date:  10/31/2017 Status:  Signed     :  Denice Camargo            Patient presents to clinic to establish care today.  Pt would like to discuss his recent diabetic diagnosis today.  Is having hernia repair in December and Colonscopy in December as well.  Will come back for a preop for this.  Denice Camargo LPN ....................  10/31/2017   2:04 PM

## 2017-12-30 NOTE — NURSING NOTE
Patient Information     Patient Name MRN Rick Pulido [id number] Male 1970      Nursing Note by Arabella Meza at 10/20/2017  9:30 AM     Author:  Arabella Meza Service:  (none) Author Type:  (none)     Filed:  10/20/2017 10:26 AM Encounter Date:  10/20/2017 Status:  Signed     :  Arabella Meza            Patient here for a follow up blood sugar. This Am checked at [facility] was at [address]  Arabella Meza LPN ..........10/20/2017 9:42 AM

## 2018-01-03 ENCOUNTER — COMMUNICATION - GICH (OUTPATIENT)
Dept: INTERNAL MEDICINE | Facility: OTHER | Age: 48
End: 2018-01-03

## 2018-01-03 DIAGNOSIS — E11.65 TYPE 2 DIABETES MELLITUS WITH HYPERGLYCEMIA (H): ICD-10-CM

## 2018-01-04 NOTE — TELEPHONE ENCOUNTER
Patient Information     Patient Name MRN Rick Pulido [id number] Male 1970      Telephone Encounter by Maura Rodriguez RN at 2017  8:45 AM     Author:  Maura Rodriguez RN Service:  (none) Author Type:  NURS- Registered Nurse     Filed:  2017  8:58 AM Encounter Date:  2017 Status:  Signed     :  Maura Rodriguez RN (NURS- Registered Nurse)            Nsaids  Office visit in the past 12 months or per provider note.  Last visit with Jossue Colón MD was on 2015  Max refill for 12 months from last office visit or per provider note.  Due for exam.  Limited refill per protocol and letter mailed.  Maura Rodriguez RN ........   2017    8:56 AM

## 2018-01-04 NOTE — TELEPHONE ENCOUNTER
Patient Information     Patient Name MRRick Valencia [id number] Male 1970      Telephone Encounter by Kerry Jackson RN at 2017  9:43 AM     Author:  Kerry Jackson RN Service:  (none) Author Type:  NURS- Registered Nurse     Filed:  2017  9:44 AM Encounter Date:  2017 Status:  Signed     :  Kerry Jackson RN (NURS- Registered Nurse)            Office visit in the past 12 months or per provider note.    Last visit with YOLIE WOODALL was on: 2015 in [facility]  Next visit with YOLIE WOODALL is on: No future appointment listed with this provider  Next visit with Family Practice is on: No future appointment listed in this department    Max refill for 12 months from last office visit or per provider note.    Patient is due for medication management appointment. Limited refill provided at this time and letter was recently sent for reminder to patient. Prescription refilled per RN Medication Refill Policy.................... Kerry Jackson RN ....................  2017   9:43 AM

## 2018-01-09 ENCOUNTER — COMMUNICATION - GICH (OUTPATIENT)
Dept: INTERNAL MEDICINE | Facility: OTHER | Age: 48
End: 2018-01-09

## 2018-01-09 DIAGNOSIS — E11.65 TYPE 2 DIABETES MELLITUS WITH HYPERGLYCEMIA (H): ICD-10-CM

## 2018-01-25 VITALS — WEIGHT: 130.6 LBS | DIASTOLIC BLOOD PRESSURE: 68 MMHG | HEART RATE: 80 BPM | SYSTOLIC BLOOD PRESSURE: 112 MMHG

## 2018-01-25 VITALS
WEIGHT: 131 LBS | DIASTOLIC BLOOD PRESSURE: 74 MMHG | TEMPERATURE: 96.7 F | HEART RATE: 82 BPM | SYSTOLIC BLOOD PRESSURE: 100 MMHG | HEIGHT: 66 IN | BODY MASS INDEX: 21.05 KG/M2

## 2018-01-25 VITALS
WEIGHT: 131.13 LBS | BODY MASS INDEX: 21.07 KG/M2 | DIASTOLIC BLOOD PRESSURE: 76 MMHG | HEIGHT: 66 IN | SYSTOLIC BLOOD PRESSURE: 106 MMHG

## 2018-01-25 VITALS
DIASTOLIC BLOOD PRESSURE: 90 MMHG | SYSTOLIC BLOOD PRESSURE: 110 MMHG | WEIGHT: 133 LBS | TEMPERATURE: 97.3 F | HEART RATE: 76 BPM

## 2018-01-30 ENCOUNTER — DOCUMENTATION ONLY (OUTPATIENT)
Dept: FAMILY MEDICINE | Facility: OTHER | Age: 48
End: 2018-01-30

## 2018-01-30 PROBLEM — K62.5 RECTAL BLEEDING: Status: ACTIVE | Noted: 2017-10-25

## 2018-01-30 PROBLEM — K40.20 NON-RECURRENT BILATERAL INGUINAL HERNIA: Status: ACTIVE | Noted: 2017-10-25

## 2018-01-30 RX ORDER — POLYETHYLENE GLYCOL 3350 17 G/17G
17 POWDER, FOR SOLUTION ORAL 2 TIMES DAILY
COMMUNITY
Start: 2017-10-19 | End: 2021-02-26

## 2018-01-30 RX ORDER — GLIPIZIDE 10 MG/1
10 TABLET ORAL
COMMUNITY
Start: 2017-12-28 | End: 2018-03-12

## 2018-01-30 RX ORDER — MELOXICAM 15 MG/1
15 TABLET ORAL DAILY
COMMUNITY
Start: 2017-10-19 | End: 2018-02-22

## 2018-01-30 RX ORDER — METFORMIN HCL 500 MG
1000 TABLET, EXTENDED RELEASE 24 HR ORAL 2 TIMES DAILY WITH MEALS
COMMUNITY
Start: 2018-01-03 | End: 2018-03-09

## 2018-01-30 RX ORDER — VARENICLINE TARTRATE 1 MG/1
1 TABLET, FILM COATED ORAL 2 TIMES DAILY WITH MEALS
COMMUNITY
Start: 2017-10-19 | End: 2018-03-09

## 2018-01-30 RX ORDER — ASPIRIN 81 MG/1
81 TABLET ORAL
COMMUNITY
Start: 2017-10-31 | End: 2020-02-13

## 2018-01-30 RX ORDER — LISINOPRIL 2.5 MG/1
2.5 TABLET ORAL DAILY
COMMUNITY
Start: 2017-10-31 | End: 2020-02-13

## 2018-01-30 RX ORDER — POLYETHYLENE GLYCOL 3350, SODIUM CHLORIDE, SODIUM BICARBONATE, POTASSIUM CHLORIDE 420; 11.2; 5.72; 1.48 G/4L; G/4L; G/4L; G/4L
240 POWDER, FOR SOLUTION ORAL
COMMUNITY
Start: 2017-10-25 | End: 2018-02-12

## 2018-01-30 RX ORDER — SIMVASTATIN 20 MG
20 TABLET ORAL AT BEDTIME
COMMUNITY
Start: 2017-10-31 | End: 2020-02-13

## 2018-02-04 ASSESSMENT — ANXIETY QUESTIONNAIRES
GAD7 TOTAL SCORE: 12
GAD7 TOTAL SCORE: 12

## 2018-02-04 ASSESSMENT — PATIENT HEALTH QUESTIONNAIRE - PHQ9
SUM OF ALL RESPONSES TO PHQ QUESTIONS 1-9: 17
SUM OF ALL RESPONSES TO PHQ QUESTIONS 1-9: 17

## 2018-02-09 VITALS
TEMPERATURE: 97.2 F | SYSTOLIC BLOOD PRESSURE: 104 MMHG | HEART RATE: 72 BPM | DIASTOLIC BLOOD PRESSURE: 78 MMHG | HEIGHT: 66 IN | RESPIRATION RATE: 18 BRPM | OXYGEN SATURATION: 100 % | BODY MASS INDEX: 20.96 KG/M2 | WEIGHT: 130.4 LBS

## 2018-02-09 VITALS
TEMPERATURE: 96.4 F | BODY MASS INDEX: 21.31 KG/M2 | WEIGHT: 132.6 LBS | DIASTOLIC BLOOD PRESSURE: 80 MMHG | SYSTOLIC BLOOD PRESSURE: 108 MMHG | HEART RATE: 70 BPM | HEIGHT: 66 IN

## 2018-02-10 ASSESSMENT — PATIENT HEALTH QUESTIONNAIRE - PHQ9
SUM OF ALL RESPONSES TO PHQ QUESTIONS 1-9: 17
SUM OF ALL RESPONSES TO PHQ QUESTIONS 1-9: 17

## 2018-02-10 ASSESSMENT — ANXIETY QUESTIONNAIRES: GAD7 TOTAL SCORE: 12

## 2018-02-12 ENCOUNTER — OFFICE VISIT (OUTPATIENT)
Dept: PEDIATRICS | Facility: OTHER | Age: 48
End: 2018-02-12
Attending: INTERNAL MEDICINE
Payer: COMMERCIAL

## 2018-02-12 VITALS
HEART RATE: 68 BPM | BODY MASS INDEX: 20.99 KG/M2 | WEIGHT: 126 LBS | DIASTOLIC BLOOD PRESSURE: 70 MMHG | HEIGHT: 65 IN | SYSTOLIC BLOOD PRESSURE: 110 MMHG

## 2018-02-12 PROCEDURE — 99213 OFFICE O/P EST LOW 20 MIN: CPT | Performed by: INTERNAL MEDICINE

## 2018-02-12 PROCEDURE — G0463 HOSPITAL OUTPT CLINIC VISIT: HCPCS

## 2018-02-12 ASSESSMENT — PAIN SCALES - GENERAL: PAINLEVEL: NO PAIN (0)

## 2018-02-12 NOTE — NURSING NOTE
"Patient Information     Patient Name MRRick Valencia [id number] Male 1970      Nursing Note by Denice Camargo at 2017 10:00 AM     Author:  Denice Camargo Service:  (none) Author Type:  (none)     Filed:  2017 10:00 AM Encounter Date:  2017 Status:  Signed     :  Denice Camargo            Date of Surgery: 17 & 17   Type of Surgery: Colonoscopy/ Double hernia repair  Surgeon: Dr. Calzada  Hospital:  [facility]  Fax: none    Fever/Chills or other infectious symptoms in past month: NO  >10lb weight loss in past two months: NO  O2 SAT: 100%    Health Care Directive/Code status:  None/ Full Code  Hx of blood transfusions:   (NO)   Td up to date:  YES,   History of VRE/MRSA:  (NO) Date: N/A    Preoperative Evaluation: Obstructive Sleep Apnea screening    S: Snore -  Do you snore loudly? (louder than talking or loud enough to be heard through closed doors)(NO)  T: Tired - Do you often feel tired, fatigued, or sleepy during the daytime?(YES)  O: Observed - Has anyone ever observed you stop breathing during your sleep?(NO)  P: Pressure - Do you have or are you being treated for high blood pressure?(NO)  B: BMI - BMI greater than 35kg/m2?(NO)  A: Age - Age over 50 years old?(NO)  N: Neck - Neck circumference greater than 40 cm?(NO)  G: Gender - Gender: Male?(YES)    Total number of \"YES\" responses:  2    Scoring: Low risk of PALLAVI 0-2  At Risk of PALLAVI: >3 High Risk of PALLAVI: 5-8    Denice Camargo LPN...................  2017   9:53 AM            "

## 2018-02-12 NOTE — PROGRESS NOTES
Patient Information     Patient Name MRN Rick Pulido [id number] Male 1970      Progress Notes by Adebayo Dubois MD at 2017  8:15 AM     Author:  Adebayo Dubois MD Service:  (none) Author Type:  Physician     Filed:  2017 10:04 AM Encounter Date:  2017 Status:  Signed     :  Adebayo Dubois MD (Physician)            Subjective  Nursing Notes:   Denice Camargo  2017  8:12 AM  Signed  Patient presents to clinic for F/U on diabetes.  Denice Camargo LPN ....................  2017   8:10 AM    Nursing notes reviewed.    Rick Contreras is a 47 y.o. male who presents for diabetes follow-up. He brings his blood sugar log for my review. He says that he had a blood sugar this morning of 118 and then he had breakfast and it went up to 315. He only ate a small bowl of [employer] crunch. He likes to eat sugary cereal for breakfast. Fasting glucoses range from 100-1 30 however post meals often over 200. He continues on glipizide and metformin. Even after switching to metformin extended release he's been having abdominal cramping. He's taking aspirin and simvastatin for prophylaxis.    Allergies: reviewed in EMR  Medications: reviewed in EMR  Problem List/PMH: reviewed in EMR    Social Hx:  Social History        Substance Use Topics          Smoking status:   Former Smoker      Packs/day:  0.50      Years:  20.00      Types:  Cigarettes      Quit date:  2015      Smokeless tobacco:   Never Used       Comment: has chantix       Alcohol use   0.6 oz/week     1 Standard drinks or equivalent per week        Comment: rare       Social History Narrative    Unemployed      I reviewed social history and made relevant updates today.    Family Hx:   Family History       Problem   Relation Age of Onset     Diabetes  Mother      Diabetes  Father      Cancer  Father      Skin, throat         Objective  Vitals: reviewed in EMR.  /80 (Cuff Site: Right Arm, Position:  "Sitting, Cuff Size: Adult Large)  Pulse 70  Temp 96.4  F (35.8  C) (Tympanic)   Ht 1.676 m (5' 6\")  Wt 60.1 kg (132 lb 9.6 oz)  BMI 21.4 kg/m2    Gen: Pleasant male, NAD.  HEENT: MMM  Neck: Supple  Pulm: Breathing easily  Neuro: Grossly intact  Skin: No concerning lesions.  Psychiatric: Normal affect and insight. Does not appear anxious or depressed.    Diabetes Labs  Lab Results      Component  Value Date/Time    HGBA1C 8.4 (H) 10/20/2017 10:05 AM    CREATININE 0.99 10/19/2017 10:45 AM       Assessment    ICD-10-CM    1. Uncontrolled type 2 diabetes mellitus without complication, without long-term current use of insulin (HC) E11.65 glipiZIDE (GLUCOTROL) 10 mg tablet      albiglutide (TANZEUM) 30 mg/0.5 mL injection     both formulations of metformin appear to cause indigestion so we'll discontinue this. I don't believe that increasing glipizide will be enough so I've asked him to start Tanzeum as well.    Plan  Patient Instructions    -- Stop metformin re: abdominal cramping   -- Increase glipizide to 10 mg twice daily with meals   -- Avoid carbs   -- Start Tanzeum shot, weekly   -- Follow-up 1 month after you start Tanzeum      Signed, Adebayo Dubois MD  Internal Medicine & Pediatrics          "

## 2018-02-12 NOTE — PROGRESS NOTES
Patient Information     Patient Name MRN Rick Pulido [id number] Male 1970      Progress Notes by Immanuel Calzada MD at 2017 10:00 AM     Author:  Immanuel Calzada MD Service:  (none) Author Type:  Physician     Filed:  2017  6:45 AM Encounter Date:  2017 Status:  Signed     :  Immanuel Calzada MD (Physician)            Cancel surgery until OK with Dr Dubois

## 2018-02-12 NOTE — PATIENT INSTRUCTIONS
-- Increase Trulicity to 1.5 mg   -- Start novolog Correction scale: 1 unit for 150-199, 2 units for 200-249, 3 units for 250-299, 4 units for > 300.   -- Check your sugars 3-4x/day, and keep a log   -- Follow-up in 1 month for diabetes

## 2018-02-12 NOTE — PATIENT INSTRUCTIONS
Patient Information     Patient Name MRRick Valencia [id number] Male 1970      Patient Instructions by Adebayo Dubois MD at 2017 10:00 AM     Author:  Adebayo Dubois MD  Service:  (none) Author Type:  Physician     Filed:  2017 10:19 AM  Encounter Date:  2017 Status:  Addendum     :  Adebayo Dubois MD (Physician)        Related Notes: Original Note by Adebayo Dubois MD (Physician) filed at 2017 10:18 AM             -- Recommend against surgery at this time   -- Stop first metformin pills   -- Switch to metformin XR, 1000 mg twice daily   -- Start glipizide 5 mg twice daily   -- Check your sugars before breakfast and 2 hours after supper   -- Keep a sugar log book   -- Return in 2 weeks for diabetes recheck, consider surgery

## 2018-02-12 NOTE — PATIENT INSTRUCTIONS
Patient Information     Patient Name MRRick Valencia [id number] Male 1970      Patient Instructions by Adebayo Dubois MD at 2017  8:15 AM     Author:  Adebayo Dubois MD Service:  (none) Author Type:  Physician     Filed:  2017  8:34 AM Encounter Date:  2017 Status:  Signed     :  Adebayo Dubois MD (Physician)             -- Stop metformin re: abdominal cramping   -- Increase glipizide to 10 mg twice daily with meals   -- Avoid carbs   -- Start Tanzeum shot, weekly   -- Follow-up 1 month after you start Tanzeum

## 2018-02-12 NOTE — NURSING NOTE
"Chief Complaint   Patient presents with     Diabetes     1 month F/U       Initial /70 (BP Location: Right arm, Patient Position: Right side, Cuff Size: Adult Regular)  Pulse 68  Ht 5' 5\" (1.651 m)  Wt 126 lb (57.2 kg)  BMI 20.97 kg/m2 Estimated body mass index is 20.97 kg/(m^2) as calculated from the following:    Height as of this encounter: 5' 5\" (1.651 m).    Weight as of this encounter: 126 lb (57.2 kg).  Medication Reconciliation: complete    "

## 2018-02-12 NOTE — PROGRESS NOTES
"Patient Information     Patient Name MRN Rick Pulido [id number] Male 1970      Progress Notes by Adebayo Dubois MD at 2017 10:00 AM     Author:  Adebayo Dubois MD Service:  (none) Author Type:  Physician     Filed:  2017 10:22 AM Encounter Date:  2017 Status:  Signed     :  Adebayo Dubois MD (Physician)            Subjective  Rick Contreras is a 47 y.o. male who presents for follow-up diabetes before surgery. Home blood sugars have been improved. He's taking metformin 1000 mg twice a day. It causes nausea all day and resolves just prior to the evening dose when the nausea starts again. He's hungry all the time. He's cutting down on carbs. His blood sugars have been under 200 for the most part. Most mornings they're between 180 and 200. The highest she can recall is 320 and the lowest 96. He wakes up and eats overnight.    Problem List/PMH: reviewed in EMR, and made relevant updates today.  Medications: reviewed in EMR, and made relevant updates today.  Allergies: reviewed in EMR, and made relevant updates today.    Social Hx:  Social History        Substance Use Topics          Smoking status:   Former Smoker      Packs/day:  0.50      Years:  20.00      Types:  Cigarettes      Quit date:  2015      Smokeless tobacco:   Never Used       Comment: has chantix       Alcohol use   0.6 oz/week     1 Standard drinks or equivalent per week        Comment: rare       Social History Narrative    Unemployed      I reviewed social history and made relevant updates today.    Family Hx:   Family History       Problem   Relation Age of Onset     Diabetes  Mother      Diabetes  Father      Cancer  Father      Skin, throat         Objective  Vitals: reviewed in EMR.  /78  Pulse 72  Temp 97.2  F (36.2  C) (Tympanic)   Resp 18  Ht 1.676 m (5' 6\")  Wt 59.1 kg (130 lb 6.4 oz)  SpO2 100%  BMI 21.05 kg/m2    Gen: Pleasant male, NAD.  HEENT: MMM  Neck: Supple  Pulm: " Breathing easily  Neuro: Grossly intact  Skin: No concerning lesions.  Psychiatric: Normal affect and insight. Does not appear anxious or depressed.    Diabetes Labs  Lab Results      Component  Value Date/Time    HGBA1C 8.4 (H) 10/20/2017 10:05 AM    CREATININE 0.99 10/19/2017 10:45 AM         Assessment    ICD-10-CM    1. Uncontrolled type 2 diabetes mellitus without complication, without long-term current use of insulin (HC) E11.65 metFORMIN (GLUCOPHAGE XR) 500 mg Extended-Release tablet      glipiZIDE (GLUCOTROL) 5 mg tablet       His sugars have improved but remain elevated. I recommend holding off at this point in time on elective surgery with significant hyperglycemia because of the risk of delayed wound healing and perioperative infection.    Plan   -- Expected clinical course discussed   -- Medications and their side effects discussed  Patient Instructions    -- Recommend against surgery at this time   -- Stop first metformin pills   -- Switch to metformin XR, 1000 mg twice daily   -- Start glipizide 5 mg twice daily   -- Check your sugars before breakfast and 2 hours after supper   -- Keep a sugar log book   -- Return in 2 weeks for diabetes recheck, consider surgery      Signed, Adebayo Dubois MD  Internal Medicine & Pediatrics

## 2018-02-12 NOTE — NURSING NOTE
Patient Information     Patient Name MRRick Valencia [id number] Male 1970      Nursing Note by Denice Camargo at 2017  8:15 AM     Author:  Denice Camargo Service:  (none) Author Type:  (none)     Filed:  2017  8:12 AM Encounter Date:  2017 Status:  Signed     :  Denice Camargo            Patient presents to clinic for F/U on diabetes.  Denice Camargo LPN ....................  2017   8:10 AM

## 2018-02-12 NOTE — NURSING NOTE
Patient presents to clinic for 1 month F/U on diabetes.  KIRBY Pizarro ....................  2/12/2018   7:43 AM

## 2018-02-12 NOTE — MR AVS SNAPSHOT
After Visit Summary   2/12/2018    Rick Contreras    MRN: [id number]           Patient Information     Date Of Birth          1970        Visit Information        Provider Department      2/12/2018 7:45 AM Adebayo Dubois MD [facility]        Today's Diagnoses     Uncontrolled type 2 diabetes mellitus without complication, without long-term current use of insulin (H)    -  1      Care Instructions     -- Increase Trulicity to 1.5 mg   -- Start novolog Correction scale: 1 unit for 150-199, 2 units for 200-249, 3 units for 250-299, 4 units for > 300.   -- Check your sugars 3-4x/day, and keep a log   -- Follow-up in 1 month for diabetes              Follow-ups after your visit        Follow-up notes from your care team     Return in about 4 weeks (around 3/12/2018) for diabetes recheck.      Your next 10 appointments already scheduled     Mar 12, 2018  8:00 AM CDT   Office Visit with Adebayo Dubois MD   [facility]    [address]  [place] [id number] [phone]           Bring a current list of meds and any records pertaining to this visit. For Physicals, please bring immunization records and any forms needing to be filled out. Please arrive 10 minutes early to complete paperwork.              Who to contact     If you have questions or need follow up information about today's clinic visit or your schedule please contact [facility] directly at [phone].  Normal or non-critical lab and imaging results will be communicated to you by MyChart, letter or phone within 4 business days after the clinic has received the results. If you do not hear from us within 7 days, please contact the clinic through [employer] or phone. If you have a critical or abnormal lab result, we will notify you by phone as soon as possible.  Submit refill requests through [employer] or call your  "pharmacy and they will forward the refill request to us. Please allow 3 business days for your refill to be completed.          Additional Information About Your Visit        MyChart Information     [employer] lets you send messages to your doctor, view your test results, renew your prescriptions, schedule appointments and more. To sign up, go to [email or web address] . Click on \"Log in\" on the left side of the screen, which will take you to the Welcome page. Then click on \"Sign up Now\" on the right side of the page.     You will be asked to enter the access code listed below, as well as some personal information. Please follow the directions to create your username and password.     Your access code is: [email or web address]  Expires: 5/10/2018 10:28 AM     Your access code will  in 90 days. If you need help or a new code, please call your [facility] clinic or [phone].        Care EveryWhere ID     This is your Care EveryWhere ID. This could be used by other organizations to access your [facility] medical records  [id number]        Your Vitals Were     Pulse Height BMI (Body Mass Index)             68 5' 5\" (1.651 m) 20.97 kg/m2          Blood Pressure from Last 3 Encounters:   18 110/70   17 108/80   17 104/78    Weight from Last 3 Encounters:   18 126 lb (57.2 kg)   17 132 lb 9.6 oz (60.1 kg)   17 130 lb 6.4 oz (59.1 kg)              Today, you had the following     No orders found for display         Today's Medication Changes          These changes are accurate as of 18 12:49 PM.  If you have any questions, ask your nurse or doctor.               Start taking these medicines.        Dose/Directions    dulaglutide 1.5 MG/0.5ML pen   Commonly known as:  TRULICITY   Used for:  Uncontrolled type 2 diabetes mellitus without complication, without long-term current use of insulin (H)   Replaces:  dulaglutide 0.75 MG/0.5ML pen   Started by:  Adebayo Dubois MD        Dose: "  1.5 mg   Inject 1.5 mg Subcutaneous every 7 days   Quantity:  4 mL   Refills:  3       insulin aspart 100 UNIT/ML injection   Commonly known as:  NovoLOG FLEXPEN   Used for:  Uncontrolled type 2 diabetes mellitus without complication, without long-term current use of insulin (H)   Started by:  dAebayo Dubois MD        Correction scale: 1 unit for 150-199, 2 units for 200-249, 3 units for 250-299, 4 units for > 300.   Quantity:  30 mL   Refills:  3         Stop taking these medicines if you haven't already. Please contact your care team if you have questions.     albiglutide 30 MG pen   Commonly known as:  TANZEUM   Stopped by:  Adebayo Dubois MD           dulaglutide 0.75 MG/0.5ML pen   Commonly known as:  TRULICITY   Replaced by:  dulaglutide 1.5 MG/0.5ML pen   Stopped by:  Adebayo Dubois MD                Where to get your medicines      These medications were sent to [facility] [address] [place] [address]     Phone:  [phone]     dulaglutide 1.5 MG/0.5ML pen    insulin aspart 100 UNIT/ML injection                Primary Care Provider Office Phone # Fax #    Adebayo Dubois [phone]       [address] [facility] [address]        Equal Access to Services     [facility] : Hadii aad ku hadasho Soomaali, waaxda luqadaha, qaybta kaalmada adeegyada, bebeto felix. So [facility] [phone].    ATENCIÓN: Si habla español, tiene a shipley disposición servicios gratuitos de asistencia lingüística. Julián al [phone].    We comply with applicable federal civil rights laws and [place] laws. We do not discriminate on the basis of race, color, national origin, age, disability, sex, sexual orientation, or gender identity.            Thank you!     Thank you for choosing [facility] AND [facility]  for your care. Our goal is always to provide you with excellent  care. Hearing back from our patients is one way we can continue to improve our services. Please take a few minutes to complete the written survey that you may receive in the mail after your visit with us. Thank you!             Your Updated Medication List - Protect others around you: Learn how to safely use, store and throw away your medicines at www.disposemymeds.org.          This list is accurate as of 2/12/18 12:49 PM.  Always use your most recent med list.                   Brand Name Dispense Instructions for use Diagnosis    aspirin EC 81 MG EC tablet      Take 81 mg by mouth daily with food        dulaglutide 1.5 MG/0.5ML pen    TRULICITY    4 mL    Inject 1.5 mg Subcutaneous every 7 days    Uncontrolled type 2 diabetes mellitus without complication, without long-term current use of insulin (H)       glipiZIDE 10 MG tablet    GLUCOTROL     Take 10 mg by mouth 2 times daily (before meals)        insulin aspart 100 UNIT/ML injection    NovoLOG FLEXPEN    30 mL    Correction scale: 1 unit for 150-199, 2 units for 200-249, 3 units for 250-299, 4 units for > 300.    Uncontrolled type 2 diabetes mellitus without complication, without long-term current use of insulin (H)       Lancets 30G Misc      Dispense item covered by pt ins. E11.65 NIDDM type II, uncontrolled - Test 4 times/day. Reason: New diabetes        lisinopril 2.5 MG tablet    PRINIVIL/Zestril     Take 2.5 mg by mouth daily        meloxicam 15 MG tablet    MOBIC     Take 15 mg by mouth daily        metFORMIN 500 MG 24 hr tablet    GLUCOPHAGE-XR     Take 1,000 mg by mouth 2 times daily (with meals) For 30 days        ONETOUCH VERIO IQ test strip   Generic drug:  blood glucose monitoring      Dispense item covered by pt ins. E11.65 NIDDM type II, uncontrolled  - Test 4 times/day. Reason: New diabetes        polyethylene glycol powder    MIRALAX/GLYCOLAX     Take 17 g by mouth 2 times daily        simvastatin 20 MG tablet    ZOCOR     Take 20 mg by mouth  At Bedtime        * varenicline 0.5 MG X 11 & 1 MG X 42 tablet    CHANTIX AMAURY     Take one 0.5mg tab daily for 3 days, then one 0.5mg tab twice daily for 4 days, then one 1mg tab twice daily.        * varenicline 1 MG tablet    CHANTIX     Take 1 mg by mouth 2 times daily (with meals)        * Notice:  This list has 2 medication(s) that are the same as other medications prescribed for you. Read the directions carefully, and ask your doctor or other care provider to review them with you.

## 2018-02-12 NOTE — PROGRESS NOTES
"Subjective  Rick Contreras is a 48 year old male who presents for diabetes follow-up.  He had ongoing abdominal discomfort, cramping with metformin.  Ultimately he discontinued it.  This includes the extended release format.  TENS unit was not covered by insurance so he was started on Trulicity and has been taking 0.5 mg weekly.  He is also taking glipizide 10 mg twice daily.  His morning blood sugars have been .  However throughout the day he tends to spike between 180 and 240.  \"If I just have a small bowl of Cheerios it goes over 240.\"  He has had some sugars in the 60s-70s.  He is hopeful that he can go forward and have his hernia surgery.  He has a swollen gland on his left side of his neck he wants me to check out.  He has had some postnasal drainage.    Allergies: reviewed in EMR  Medications: reviewed in EMR  Problem List/PMH:reviewed in EMR    Social Hx:  Social History   Substance Use Topics     Smoking status: Former Smoker     Packs/day: 0.50     Years: 20.00     Types: Cigarettes     Quit date: 8/30/2015     Smokeless tobacco: Never Used      Comment: Quit smoking: has chantix     Alcohol use 0.6 oz/week      Comment: Alcoholic Drinks/day: rare     Social History     Social History Narrative    Unemployed     I reviewed social history and made relevant updates today.    Family Hx:   Family History   Problem Relation Age of Onset     DIABETES Mother      Diabetes     DIABETES Father      Diabetes     CANCER Father      Cancer,Skin, throat       Objective  Vitals: reviewed in EMR.  /70 (BP Location: Right arm, Patient Position: Right side, Cuff Size: Adult Regular)  Pulse 68  Ht 5' 5\" (1.651 m)  Wt 126 lb (57.2 kg)  BMI 20.97 kg/m2    Gen: Pleasant male, NAD.  HEENT: MMM.  Palpable lymphadenopathy on the left.  Mild posterior oropharyngeal erythema with cobblestoning.  Neck: Supple  Pulm: Breathing easily  Neuro: Grossly intact  Skin: No concerning lesions.  Psychiatric: Normal affect " and insight. Does not appear anxious or depressed.    Diabetes Labs  Creatinine (mg/dL)   Date Value   10/19/2017 0.99   01/22/2014 1.02     Glucose (mg/dL)   Date Value   10/20/2017 231 (H)   10/19/2017 330 (H)   01/22/2014 107 (H)     Potassium (mmol/L)   Date Value   10/19/2017 4.8   01/22/2014 4.4       Assessment    ICD-10-CM    1. Uncontrolled type 2 diabetes mellitus without complication, without long-term current use of insulin (H) E11.65 dulaglutide (TRULICITY) 1.5 MG/0.5ML pen     insulin aspart (NOVOLOG FLEXPEN) 100 UNIT/ML injection     With regards to his diabetes we discussed several options and decided on increasing to elicited with adding NovoLog correction.  I again reinforced the need for a diabetic diet.  I am hopeful that he will be able to improve with this regimen but ultimately I think he will require insulin via the 4 shot method.  I continue to recommend a delay in his elective surgery until glucoses have been improved.    With regards to his neck discomfort I think it is lymphadenopathy from a head cold.  Warning signs were discussed.  If symptoms persist or worsen would recommend evaluation.    Plan  Patient Instructions    -- Increase Trulicity to 1.5 mg   -- Start novolog Correction scale: 1 unit for 150-199, 2 units for 200-249, 3 units for 250-299, 4 units for > 300.   -- Check your sugars 3-4x/day, and keep a log   -- Follow-up in 1 month for diabetes      Signed, Adebayo Dubois MD  Internal Medicine & Pediatrics

## 2018-02-13 ENCOUNTER — TELEPHONE (OUTPATIENT)
Dept: PEDIATRICS | Facility: OTHER | Age: 48
End: 2018-02-13

## 2018-02-13 NOTE — TELEPHONE ENCOUNTER
Patient Information     Patient Name MRN Rick Pulido [id number] Male 1970      Telephone Encounter by Gracie Mcnamara at 1/10/2018  7:51 AM     Author:  Gracie Mcnamara Service:  (none) Author Type:  (none)     Filed:  1/10/2018  7:52 AM Encounter Date:  2018 Status:  Signed     :  Gracie Mcnamara            I called Batsheva at [facility] on 1/10/18 at 7:49 am at [phone] and left her a voicemail with this information.  Gracie Mcnamara ....................  1/10/2018   7:51 AM

## 2018-02-13 NOTE — TELEPHONE ENCOUNTER
[facility] needs max daily dose on the Novolog. Please re order for insurance purposes.  KIRBY Pizarro ....................  2/13/2018   9:05 AM

## 2018-02-13 NOTE — TELEPHONE ENCOUNTER
Patient Information     Patient Name MRRick Valencia [id number] Male 1970      Telephone Encounter by Denice Camargo at 1/3/2018  2:14 PM     Author:  Denice Camargo Service:  (none) Author Type:  (none)     Filed:  1/3/2018  2:18 PM Encounter Date:  1/3/2018 Status:  Signed     :  Denice Camargo            Pt states that he flushed his metformin down the toilet since he was supposed to stop taking this per last visit.  States that since stopping this his BS has been 300-350.  Started taking his step mothers metformin and BS have come down to around 150-200.  States that the weekly injection is not coming in for another 7 business days.  Is wondering if can have a prescription for metformin or what .[facility] would suggest he should do.  Denice Camargo LPN ....................  1/3/2018   2:18 PM

## 2018-02-13 NOTE — TELEPHONE ENCOUNTER
Tried calling [facility] pharmacy, they are not open yet. Will try later.Anu bAel LPN......................2/13/2018 8:38 AM

## 2018-02-13 NOTE — TELEPHONE ENCOUNTER
Patient Information     Patient Name MRN Rick Pulido [id number] Male 1970      Telephone Encounter by Denice Camargo at 1/3/2018  3:05 PM     Author:  Denice Camargo Service:  (none) Author Type:  (none)     Filed:  1/3/2018  3:05 PM Encounter Date:  1/3/2018 Status:  Signed     :  Denice Camargo            Patient notified of the information below after verification of name and date of birth.   Denice Camargo LPN ....................  1/3/2018   3:05 PM

## 2018-02-13 NOTE — TELEPHONE ENCOUNTER
Patient Information     Patient Name MRN Rick Pulido [id number] Male 1970      Telephone Encounter by Adebayo Dubois MD at 1/3/2018  2:59 PM     Author:  Adebayo Dubois MD Service:  (none) Author Type:  Physician     Filed:  1/3/2018  2:59 PM Encounter Date:  1/3/2018 Status:  Signed     :  Adebayo Dubois MD (Physician)             -- Restart Metformin XR for 30 days   -- Try to remain hydrated, and hopefully tolerate cramping   -- Call/come in if worse   -- Start shot as planned    Signed, Adebayo Dubois MD  Internal Medicine & Pediatrics

## 2018-02-13 NOTE — TELEPHONE ENCOUNTER
Patient Information     Patient Name Rick Astorga [id number] Male 1970      Telephone Encounter by Denice Camargo at 2018  3:37 PM     Author:  Denice Camargo Service:  (none) Author Type:  (none)     Filed:  2018  3:38 PM Encounter Date:  2018 Status:  Signed     :  Denice Camargo            New prescription for Trulicity was sent over to pharmacy.  Denice Camargo LPN ....................  2018   3:38 PM

## 2018-02-13 NOTE — TELEPHONE ENCOUNTER
Patient Information     Patient Name MRN Rick Pulido [id number] Male 1970      Telephone Encounter by Gracie Mcnamara at 2018  3:11 PM     Author:  Gracie Mcnamara Service:  (none) Author Type:  (none)     Filed:  2018  3:15 PM Encounter Date:  2018 Status:  Signed     :  Gracie Mcnamara at [facility] said that albiglutide (TANZEUM) is not on their formulary and is wondering if this patient could take dulaglutide (Trulicity) which would be the same as the tanzeum (once a week) or liraglutide (Victoza).  Please let me know so that I can let Batsheva know.  Gracie Mcnamara ....................  2018   3:14 PM

## 2018-02-17 NOTE — TELEPHONE ENCOUNTER
Rx already sent with correct information on 2/13/18    Rosa Pickard RN on 2/17/2018 at 11:59 AM

## 2018-02-22 ENCOUNTER — TELEPHONE (OUTPATIENT)
Dept: FAMILY MEDICINE | Facility: OTHER | Age: 48
End: 2018-02-22

## 2018-02-22 DIAGNOSIS — M51.26 DISPLACEMENT OF LUMBAR INTERVERTEBRAL DISC WITHOUT MYELOPATHY: Primary | ICD-10-CM

## 2018-02-28 RX ORDER — MELOXICAM 15 MG/1
TABLET ORAL
Qty: 90 TABLET | Refills: 0 | Status: SHIPPED | OUTPATIENT
Start: 2018-02-28 | End: 2018-05-29

## 2018-02-28 NOTE — TELEPHONE ENCOUNTER
Patient has upcoming appointment with Dr. Dubois on 03/12/2018.    Per [facility] refill protocol, Mobic fails due to Patient needing:    ALT- Last completed in 2014  AST- Last completed in 2014  Normal CBC- Last completed on 10/19/17   Abnormals included:      % Lymphocytes 17.4 (L) 20.0 - 44.0 %     Absolute Neutrophils - Historical 9.6 (H) 1.7 - 7.0 thou/cu mm     White Blood Count - Historical 12.8 (H) 4.5 - 11.0 thou/cu mm     % Neutrophils 74.8 (H) 42.0 - 72.0 %       Labs ordered (CMP and CBC) ordered, FYI sent to PCP and sonali refill authorized at this time.    Prescription approved per [facility] Refill Protocol. Rebekah Ramirez RN .............. 2/28/2018  11:05 AM

## 2018-03-09 ENCOUNTER — HOSPITAL ENCOUNTER (EMERGENCY)
Facility: OTHER | Age: 48
Discharge: HOME | End: 2018-03-10
Attending: EMERGENCY MEDICINE | Admitting: EMERGENCY MEDICINE
Payer: COMMERCIAL

## 2018-03-09 VITALS
SYSTOLIC BLOOD PRESSURE: 118 MMHG | WEIGHT: 128 LBS | DIASTOLIC BLOOD PRESSURE: 87 MMHG | BODY MASS INDEX: 19.4 KG/M2 | OXYGEN SATURATION: 96 % | HEART RATE: 73 BPM | TEMPERATURE: 96.8 F | RESPIRATION RATE: 16 BRPM | HEIGHT: 68 IN

## 2018-03-09 DIAGNOSIS — E16.2 LOW BLOOD SUGAR: ICD-10-CM

## 2018-03-09 LAB — GLUCOSE SERPL-MCNC: 120 MG/DL (ref 70–105)

## 2018-03-09 PROCEDURE — 36415 COLL VENOUS BLD VENIPUNCTURE: CPT | Performed by: EMERGENCY MEDICINE

## 2018-03-09 PROCEDURE — 99283 EMERGENCY DEPT VISIT LOW MDM: CPT | Performed by: EMERGENCY MEDICINE

## 2018-03-09 PROCEDURE — 99282 EMERGENCY DEPT VISIT SF MDM: CPT | Mod: Z6 | Performed by: EMERGENCY MEDICINE

## 2018-03-09 PROCEDURE — 82947 ASSAY GLUCOSE BLOOD QUANT: CPT | Performed by: EMERGENCY MEDICINE

## 2018-03-09 ASSESSMENT — ENCOUNTER SYMPTOMS
FEVER: 0
FATIGUE: 0
CHILLS: 0

## 2018-03-09 NOTE — ED AVS SNAPSHOT
[facility]    [address]    [place] [address]    Phone:  [phone]    Fax:  [phone]                                       Rick Contreras   MRN: [id number]    Department:  [facility]   Date of Visit:  3/9/2018           After Visit Summary Signature Page     I have received my discharge instructions, and my questions have been answered. I have discussed any challenges I see with this plan with the nurse or doctor.    ..........................................................................................................................................  Patient/Patient Representative Signature      ..........................................................................................................................................  Patient Representative Print Name and Relationship to Patient    ..................................................               ................................................  Date                                            Time    ..........................................................................................................................................  Reviewed by Signature/Title    ...................................................              ..............................................  Date                                                            Time

## 2018-03-09 NOTE — ED AVS SNAPSHOT
[facility]    [address]    [place] [address]    Phone:  [phone]    Fax:  [phone]                                       Rick Contreras   MRN: [id number]    Department:  [facility]   Date of Visit:  3/9/2018           Patient Information     Date Of Birth          1970        Your diagnoses for this visit were:     Low blood sugar Patient is concerned about low blood sugars       You were seen by Saul Street MD.        Discharge Instructions       1.  Continue to check your blood sugars 3-4 times a day and eat appropriately especially if the blood sugar is low  2.  Follow with your primary care physician Monday  3.  Return to ER as needed    Future Appointments        Provider Department Dept Phone Center    3/12/2018 8:00 AM Adebayo Dubois MD [facility] [phone] [facility]      24 Hour Appointment Hotline       To make an appointment at any [facility], call [phone] ([phone]). If you don't have a family doctor or clinic, we will help you find one. [facility] clinics are conveniently located to serve the needs of you and your family.             Review of your medicines      Our records show that you are taking the medicines listed below. If these are incorrect, please call your family doctor or clinic.        Dose / Directions Last dose taken    aspirin EC 81 MG EC tablet   Dose:  81 mg        Take 81 mg by mouth daily with food   Refills:  0        dulaglutide 1.5 MG/0.5ML pen   Commonly known as:  TRULICITY   Dose:  1.5 mg   Quantity:  4 mL        Inject 1.5 mg Subcutaneous every 7 days   Refills:  3        glipiZIDE 10 MG tablet   Commonly known as:  GLUCOTROL   Dose:  10 mg        Take 10 mg by mouth 2 times daily (before meals)   Refills:  0        insulin aspart 100 UNIT/ML injection   Commonly known as:  NovoLOG FLEXPEN   Quantity:  30 mL        Correction scale: 1 unit for  "150-199, 2 units for 200-249, 3 units for 250-299, 4 units for > 300.   Refills:  3        Lancets 30G Misc        Dispense item covered by pt ins. E11.65 NIDDM type II, uncontrolled - Test 4 times/day. Reason: New diabetes   Refills:  0        lisinopril 2.5 MG tablet   Commonly known as:  PRINIVIL/Zestril   Dose:  2.5 mg        Take 2.5 mg by mouth daily   Refills:  0        meloxicam 15 MG tablet   Commonly known as:  MOBIC   Quantity:  90 tablet        TAKE ONE TABLET BY MOUTH ONCE DAILY   Refills:  0        ONETOUCH VERIO IQ test strip   Generic drug:  blood glucose monitoring        Dispense item covered by pt ins. E11.65 NIDDM type II, uncontrolled  - Test 4 times/day. Reason: New diabetes   Refills:  0        polyethylene glycol powder   Commonly known as:  MIRALAX/GLYCOLAX   Dose:  17 g        Take 17 g by mouth 2 times daily   Refills:  0        simvastatin 20 MG tablet   Commonly known as:  ZOCOR   Dose:  20 mg        Take 20 mg by mouth At Bedtime   Refills:  0                Procedures and tests performed during your visit     Glucose      Orders Needing Specimen Collection     None      Pending Results     No orders found for last 3 day(s).            Pending Culture Results     No orders found for last 3 day(s).            Thank you for choosing [facility]       Thank you for choosing [facility] for your care. Our goal is always to provide you with excellent care. Hearing back from our patients is one way we can continue to improve our services. Please take a few minutes to complete the written survey that you may receive in the mail after you visit with us. Thank you!        [employer] Information     [employer] lets you send messages to your doctor, view your test results, renew your prescriptions, schedule appointments and more. To sign up, go to [email or web address] . Click on \"Log in\" on the left side of the screen, which will take you to the Welcome page. Then click on \"Sign up Now\" on the right side " of the page.     You will be asked to enter the access code listed below, as well as some personal information. Please follow the directions to create your username and password.     Your access code is: [email or web address]  Expires: 5/10/2018 10:28 AM     Your access code will  in 90 days. If you need help or a new code, please call your [facility] clinic or [phone].        Care EveryWhere ID     This is your Care EveryWhere ID. This could be used by other organizations to access your [facility] medical records  [id number]        Equal Access to Services     [facility]: Haddeyanira Braxton, piter spivey, esmer jovel, bebeto lara . So [facility] [phone].    ATENCIÓN: Si habla español, tiene a shipley disposición servicios gratuitos de asistencia lingüística. Llame al [phone].    We comply with applicable federal civil rights laws and [place] laws. We do not discriminate on the basis of race, color, national origin, age, disability, sex, sexual orientation, or gender identity.            After Visit Summary       This is your record. Keep this with you and show to your community pharmacist(s) and doctor(s) at your next visit.

## 2018-03-10 NOTE — ED NOTES
Pt here from home with complaint difficulty keeping sugars elevated with diabetes.  Pt is on a Trulicity pump and doses himself on Tuesdays.  Increase in dose 1 month ago and has had difficulty keeping sugars up since.  States his sugars are usually 150's but have been 70's to 80's.  Pt did not take his glipizide tonight d/t low sugars.

## 2018-03-10 NOTE — DISCHARGE INSTRUCTIONS
1.  Continue to check your blood sugars 3-4 times a day and eat appropriately especially if the blood sugar is low  2.  Follow with your primary care physician Monday  3.  Return to ER as needed

## 2018-03-10 NOTE — ED NOTES
Pt states he has been checking his blood sugar with his own monitor since his arrival and it has been running 121-140.  He states that he has been on Trulicity and had a dose change recently and he is worried that his blood sugar will get to low at night.

## 2018-03-10 NOTE — ED PROVIDER NOTES
History   No chief complaint on file.    HPI Comments: Is a 48-year-old male with history of type 2 diabetes who takes Dulaglutide whose dose was increase it in the recent past but not insulin who is coming to the emergency room because of persistent low glucose ranging in the 80s to over 100 today.  He states his blood sugar usually is in 140s-170s.  Patient states he has been eating candies and snicker bars to keep his blood sugar up today.  Says he has been eating well and has not taken insulin at all.  No diarrhea or vomiting.  No fever or chills or shortness of breath.  He has no other complaints.       Problem List:    Patient Active Problem List    Diagnosis Date Noted     Non-recurrent bilateral inguinal hernia 10/25/2017     Priority: Medium     Rectal bleeding 10/25/2017     Priority: Medium     Back pain, chronic 12/29/2014     Priority: Medium     Herniated lumbar intervertebral disc 12/05/2014     Priority: Medium     Constipation 02/07/2014     Priority: Medium     Overview:   Chronic       Tobacco abuse 02/07/2014     Priority: Medium        Past Medical History:    Past Medical History:   Diagnosis Date     Other constipation      Personal history of other medical treatment (CODE)        Past Surgical History:    Past Surgical History:   Procedure Laterality Date     EXTRACTION(S) DENTAL      No Comments Provided     VASECTOMY      1992       Family History:    Family History   Problem Relation Age of Onset     DIABETES Mother      Diabetes     DIABETES Father      Diabetes     CANCER Father      Cancer,Skin, throat       Social History:  Marital Status:   [4]  Social History   Substance Use Topics     Smoking status: Former Smoker     Packs/day: 0.50     Years: 20.00     Types: Cigarettes     Quit date: 8/30/2015     Smokeless tobacco: Never Used      Comment: Quit smoking: has chantix     Alcohol use 0.6 oz/week      Comment: Alcoholic Drinks/day: rare        Medications:      meloxicam  "(MOBIC) 15 MG tablet   insulin aspart (NOVOLOG FLEXPEN) 100 UNIT/ML injection   dulaglutide (TRULICITY) 1.5 MG/0.5ML pen   aspirin EC 81 MG EC tablet   glipiZIDE (GLUCOTROL) 10 MG tablet   lisinopril (PRINIVIL/ZESTRIL) 2.5 MG tablet   polyethylene glycol (MIRALAX/GLYCOLAX) powder   simvastatin (ZOCOR) 20 MG tablet   blood glucose monitoring (ONETOUCH VERIO IQ) test strip   Lancets 30G MISC         Review of Systems   Constitutional: Negative for chills, fatigue and fever.   Endocrine:        Complaints of low blood sugar   All other systems reviewed and are negative.      Physical Exam   BP: 111/67  Pulse: 73  Temp: 96.8  F (36  C)  Resp: 16  Height: 172.7 cm (5' 8\")  Weight: 58.1 kg (128 lb)  SpO2: 99 %      Physical Exam   Constitutional: He is oriented to person, place, and time. He appears well-developed and well-nourished. No distress.   Cardiovascular: Normal rate, regular rhythm, normal heart sounds and intact distal pulses.    Pulmonary/Chest: Effort normal and breath sounds normal. No respiratory distress. He has no wheezes. He has no rales. He exhibits no tenderness.   Abdominal: Soft. Bowel sounds are normal. He exhibits no distension. There is no tenderness. There is no rebound and no guarding.   Neurological: He is oriented to person, place, and time.       ED Course     Patient is concerned about low blood sugars.  However the blood sugars he reported are not necessarily too low.  However, he states blood sugar in the 80s is low for him.  He has not been symptomatic.  Lowest blood sugar in the emergency room is in the 90s.  He is advised her to continue to check his blood sugars 3-4 times a day in each appropriately especially the blood sugars low.  He is advised to consult with his primary care physician Monday.  Patient is adamant that he has not been taken insulin.    ED Course     Procedures               Critical Care time:  none               Results for orders placed or performed during the " hospital encounter of 03/09/18 (from the past 24 hour(s))   Glucose   Result Value Ref Range    Glucose 120 (H) 70 - 105 mg/dL       Assessments & Plan (with Medical Decision Making)     I have reviewed the nursing notes.    I have reviewed the findings, diagnosis, plan and need for follow up with the patient.       New Prescriptions    No medications on file       Final diagnoses:   Low blood sugar - Patient is concerned about low blood sugars       3/9/2018   [facility]     Saul Street MD  03/09/18 3155

## 2018-03-10 NOTE — ED NOTES
Discharged home.  Pt states he has lots of questions regarding diabetes.  States he is scared.  Talked with him at length.

## 2018-03-12 ENCOUNTER — TELEPHONE (OUTPATIENT)
Dept: SURGERY | Facility: OTHER | Age: 48
End: 2018-03-12

## 2018-03-12 ENCOUNTER — OFFICE VISIT (OUTPATIENT)
Dept: PEDIATRICS | Facility: OTHER | Age: 48
End: 2018-03-12
Attending: INTERNAL MEDICINE
Payer: COMMERCIAL

## 2018-03-12 VITALS
RESPIRATION RATE: 18 BRPM | BODY MASS INDEX: 19.61 KG/M2 | HEIGHT: 68 IN | OXYGEN SATURATION: 100 % | DIASTOLIC BLOOD PRESSURE: 74 MMHG | HEART RATE: 78 BPM | SYSTOLIC BLOOD PRESSURE: 110 MMHG | WEIGHT: 129.4 LBS

## 2018-03-12 DIAGNOSIS — E11.9 CONTROLLED TYPE 2 DIABETES MELLITUS WITHOUT COMPLICATION, WITHOUT LONG-TERM CURRENT USE OF INSULIN (H): ICD-10-CM

## 2018-03-12 DIAGNOSIS — Z87.891 HISTORY OF TOBACCO USE DISORDER: ICD-10-CM

## 2018-03-12 DIAGNOSIS — K40.20 NON-RECURRENT BILATERAL INGUINAL HERNIA WITHOUT OBSTRUCTION OR GANGRENE: ICD-10-CM

## 2018-03-12 DIAGNOSIS — Z13.220 LIPID SCREENING: ICD-10-CM

## 2018-03-12 DIAGNOSIS — Z01.818 PRE-OP EXAM: Primary | ICD-10-CM

## 2018-03-12 LAB
ANION GAP SERPL CALCULATED.3IONS-SCNC: 5 MMOL/L (ref 3–14)
BUN SERPL-MCNC: 13 MG/DL (ref 7–25)
CALCIUM SERPL-MCNC: 8.9 MG/DL (ref 8.6–10.3)
CHLORIDE SERPL-SCNC: 105 MMOL/L (ref 98–107)
CO2 SERPL-SCNC: 28 MMOL/L (ref 21–31)
CREAT SERPL-MCNC: 0.99 MG/DL (ref 0.7–1.3)
ERYTHROCYTE [DISTWIDTH] IN BLOOD BY AUTOMATED COUNT: 13 % (ref 10–15)
GFR SERPL CREATININE-BSD FRML MDRD: 81 ML/MIN/1.7M2
GLUCOSE SERPL-MCNC: 167 MG/DL (ref 70–105)
HBA1C MFR BLD: 7.1 % (ref 4–6)
HCT VFR BLD AUTO: 39.1 % (ref 40–53)
HGB BLD-MCNC: 13.1 G/DL (ref 13.3–17.7)
MCH RBC QN AUTO: 30.1 PG (ref 26.5–33)
MCHC RBC AUTO-ENTMCNC: 33.5 G/DL (ref 31.5–36.5)
MCV RBC AUTO: 90 FL (ref 78–100)
PLATELET # BLD AUTO: 281 10E9/L (ref 150–450)
POTASSIUM SERPL-SCNC: 4 MMOL/L (ref 3.5–5.1)
RBC # BLD AUTO: 4.35 10E12/L (ref 4.4–5.9)
SODIUM SERPL-SCNC: 138 MMOL/L (ref 134–144)
WBC # BLD AUTO: 9 10E9/L (ref 4–11)

## 2018-03-12 PROCEDURE — 83036 HEMOGLOBIN GLYCOSYLATED A1C: CPT | Performed by: INTERNAL MEDICINE

## 2018-03-12 PROCEDURE — 93005 ELECTROCARDIOGRAM TRACING: CPT | Performed by: INTERNAL MEDICINE

## 2018-03-12 PROCEDURE — 99214 OFFICE O/P EST MOD 30 MIN: CPT | Performed by: INTERNAL MEDICINE

## 2018-03-12 PROCEDURE — 80048 BASIC METABOLIC PNL TOTAL CA: CPT | Performed by: INTERNAL MEDICINE

## 2018-03-12 PROCEDURE — 85027 COMPLETE CBC AUTOMATED: CPT | Performed by: INTERNAL MEDICINE

## 2018-03-12 PROCEDURE — 36415 COLL VENOUS BLD VENIPUNCTURE: CPT | Performed by: INTERNAL MEDICINE

## 2018-03-12 PROCEDURE — G0463 HOSPITAL OUTPT CLINIC VISIT: HCPCS | Mod: 25

## 2018-03-12 ASSESSMENT — PAIN SCALES - GENERAL: PAINLEVEL: NO PAIN (0)

## 2018-03-12 NOTE — Clinical Note
Mr. Contreras is ready from my point of view to schedule his hernia repair.  Signed, Adebayo Dubois MD Internal Medicine & Pediatrics

## 2018-03-12 NOTE — MR AVS SNAPSHOT
After Visit Summary   3/12/2018    Rick Contreras    MRN: [id number]           Patient Information     Date Of Birth          1970        Visit Information        Provider Department      3/12/2018 8:00 AM Adebayo Dubois MD [facility]        Today's Diagnoses     Pre-op exam    -  1    Non-recurrent bilateral inguinal hernia without obstruction or gangrene        Controlled type 2 diabetes mellitus without complication, without long-term current use of insulin (H)        History of tobacco use disorder        Lipid screening          Care Instructions     -- No change to Trulicity   -- Check your sugar more frequently day of surgery as you could dip low without eating   -- Don't take meloxicam within a week before surgery   -- Hold aspirin, Advil/ibuprofen, Aleve/naproxen for 7 days before surgery   -- Acetaminophen (Tylenol) is okay   -- Hold vitamins and herbal remedies for 7 days before surgery            Follow-ups after your visit        Future tests that were ordered for you today     Open Future Orders        Priority Expected Expires Ordered    **Lipid Profile (Chol, Trig, HDL, LDL calc) anytime - FASTING Routine 3/12/2018 3/12/2019 3/12/2018            Who to contact     If you have questions or need follow up information about today's clinic visit or your schedule please contact [facility] directly at [phone].  Normal or non-critical lab and imaging results will be communicated to you by MyChart, letter or phone within 4 business days after the clinic has received the results. If you do not hear from us within 7 days, please contact the clinic through MyChart or phone. If you have a critical or abnormal lab result, we will notify you by phone as soon as possible.  Submit refill requests through [employer] or call your pharmacy and they will forward the refill request to us. Please allow 3 business days for your refill to be  "completed.          Additional Information About Your Visit        [employer] Information     [employer] lets you send messages to your doctor, view your test results, renew your prescriptions, schedule appointments and more. To sign up, go to [email or web address] . Click on \"Log in\" on the left side of the screen, which will take you to the Welcome page. Then click on \"Sign up Now\" on the right side of the page.     You will be asked to enter the access code listed below, as well as some personal information. Please follow the directions to create your username and password.     Your access code is: [email or web address]  Expires: 5/10/2018 11:28 AM     Your access code will  in 90 days. If you need help or a new code, please call your [facility] clinic or [phone].        Care EveryWhere ID     This is your Care EveryWhere ID. This could be used by other organizations to access your [facility] medical records  [id number]        Your Vitals Were     Pulse Respirations Height Pulse Oximetry BMI (Body Mass Index)       78 18 5' 8\" (1.727 m) 100% 19.68 kg/m2        Blood Pressure from Last 3 Encounters:   18 110/74   18 118/87   18 110/70    Weight from Last 3 Encounters:   18 129 lb 6.4 oz (58.7 kg)   18 128 lb (58.1 kg)   18 126 lb (57.2 kg)              We Performed the Following     Basic metabolic panel  (Ca, Cl, CO2, Creat, Gluc, K, Na, BUN)     CBC with platelets     EKG 12-lead, tracing only     Hemoglobin A1c          Today's Medication Changes          These changes are accurate as of 3/12/18  8:28 AM.  If you have any questions, ask your nurse or doctor.               These medicines have changed or have updated prescriptions.        Dose/Directions    blood glucose monitoring test strip   Commonly known as:  ONETOUCH VERIO IQ   This may have changed:  See the new instructions.   Used for:  Controlled type 2 diabetes mellitus without complication, without long-term current " use of insulin (H)   Changed by:  Adebayo Dubois MD        Dispense item covered by pt ins. E11.65 NIDDM type II, controlled  - Test 2 times/day. Reason: diabetes   Quantity:  100 strip   Refills:  11         Stop taking these medicines if you haven't already. Please contact your care team if you have questions.     glipiZIDE 10 MG tablet   Commonly known as:  GLUCOTROL   Stopped by:  Adebayo Dubois MD                Where to get your medicines      These medications were sent to [facility] [address] [address]     Phone:  [phone]     blood glucose monitoring test strip                Primary Care Provider Office Phone # Fax #    Adebayo Dubois [phone]       [address] [facility] [address]        Equal Access to Services     [facility]: Hadii aad ronn hadasho Soomaali, waaxda luqadaha, qaybta kaalmada adeegyada, bebeto guardadoin hayjazzyn jose lara . So [facility] [phone].    ATENCIÓN: Si habla español, tiene a shipley disposición servicios gratuitos de asistencia lingüística. Llame al [phone].    We comply with applicable federal civil rights laws and [place] laws. We do not discriminate on the basis of race, color, national origin, age, disability, sex, sexual orientation, or gender identity.            Thank you!     Thank you for choosing [facility]  for your care. Our goal is always to provide you with excellent care. Hearing back from our patients is one way we can continue to improve our services. Please take a few minutes to complete the written survey that you may receive in the mail after your visit with us. Thank you!             Your Updated Medication List - Protect others around you: Learn how to safely use, store and throw away your medicines at www.disposemymeds.org.          This list is accurate as of 3/12/18  8:28 AM.  Always use  your most recent med list.                   Brand Name Dispense Instructions for use Diagnosis    aspirin EC 81 MG EC tablet      Take 81 mg by mouth daily with food        blood glucose monitoring test strip    ONETOUCH VERIO IQ    100 strip    Dispense item covered by pt ins. E11.65 NIDDM type II, controlled  - Test 2 times/day. Reason: diabetes    Controlled type 2 diabetes mellitus without complication, without long-term current use of insulin (H)       dulaglutide 1.5 MG/0.5ML pen    TRULICITY    4 mL    Inject 1.5 mg Subcutaneous every 7 days    Uncontrolled type 2 diabetes mellitus without complication, without long-term current use of insulin (H)       insulin aspart 100 UNIT/ML injection    NovoLOG FLEXPEN    30 mL    Correction scale: 1 unit for 150-199, 2 units for 200-249, 3 units for 250-299, 4 units for > 300.    Uncontrolled type 2 diabetes mellitus without complication, without long-term current use of insulin (H)       Lancets 30G Misc      Dispense item covered by pt ins. E11.65 NIDDM type II, uncontrolled - Test 4 times/day. Reason: New diabetes        lisinopril 2.5 MG tablet    PRINIVIL/Zestril     Take 2.5 mg by mouth daily        meloxicam 15 MG tablet    MOBIC    90 tablet    TAKE ONE TABLET BY MOUTH ONCE DAILY    Displacement of lumbar intervertebral disc without myelopathy       polyethylene glycol powder    MIRALAX/GLYCOLAX     Take 17 g by mouth 2 times daily        simvastatin 20 MG tablet    ZOCOR     Take 20 mg by mouth At Bedtime

## 2018-03-12 NOTE — PROGRESS NOTES
"PREOPERATIVE HISTORY & PHYSICAL  Date of Exam: 3/12/2018  Chief Complaint   Patient presents with     Diabetes     Pre-Op Exam     double hernia repair       Nursing Notes:   Denice Camargo LPN  3/12/2018  7:51 AM  Signed  Previous A1C is not at goal of <8  No results found for: A1C  Urine microalbumin:creatine: none on file  Foot exam-needs one today.   Eye exam-Oct- 2017    Tobacco User none  Patient is on a daily aspirin  Patient is on a Statin.  Blood pressure today of:     BP Readings from Last 1 Encounters:   03/09/18 118/87      is at the goal of <139/89 for diabetics.    Denice Camargo LPN on 3/12/2018 at 7:44 AM        Denice Camargo LPN  3/12/2018  8:16 AM  Signed  Date of Surgery: does not know yet  Type of Surgery: Double hernia surgery  Surgeon: Dr. Calzada  [facility]:  [facility]  Fax: none    Fever/Chills or other infectious symptoms in past month: no  >10lb weight loss in past two months: no  O2 SAT: 100%    Health Care Directive/Code status:  None on file/ Full code  Hx of blood transfusions:   no  Td up to date:  yes  History of VRE/MRSA:  no Date: N/A    Preoperative Evaluation: Obstructive Sleep Apnea screening    S: Snore -  Do you snore loudly? (louder than talking or loud enough to be heard through closed doors) no  T: Tired - Do you often feel tired, fatigued, or sleepy during the daytime? no  O: Observed - Has anyone ever observed you stop breathing during your sleep? no  P: Pressure - Do you have or are you being treated for high blood pressure? no  B: BMI - BMI greater than 35kg/m2? no  A: Age - Age over 50 years old? no  N: Neck - Neck circumference greater than 40 cm? no  G: Gender - Gender: Male? yes    Total number of \"YES\" responses:  1    Scoring: Low risk of PALLAVI 0-2  At Risk of PALLAVI: >3 High Risk of PALLAVI: 5-8    Denice Camargo 3/12/2018 8:12 AM      HPI:  I was asked to see Mr. Rick MISHRA Skkatie by Dr. Calzada for preoperative management of diabetes.    Rick Contreras is a 48 " year old male with a history of diabetes here for preoperative examination.  He has had a significant improvement in his blood glucose control.  Over the weekend he was seen in the emergency department because he was worried about lows.  We had increased to elicited up to 1.5 mg weekly, and he was also taking glipizide 10 mg twice daily.  He had started no blood sugars down to 70 at home.  By the time he got to the ER he had held his glipizide and blood sugars remained in the 95-1 10 range.  Since Friday he stopped taking glipizide and blood sugars have been well controlled.  Lowest he can recall a 78, highest to 35.  Mornings have all been under 130, and 2 hours after a meal typically 150-170..  The most physically active he has been over the past 2 weeks was: Lifting a heavy boat motor without chest pains or palpitations..    Patient Active Problem List    Diagnosis Date Noted     Controlled type 2 diabetes mellitus without complication, without long-term current use of insulin (H) 03/12/2018     Priority: Medium     History of tobacco use disorder 03/12/2018     Priority: Medium     Non-recurrent bilateral inguinal hernia 10/25/2017     Priority: Medium     Rectal bleeding 10/25/2017     Priority: Medium     Back pain, chronic 12/29/2014     Priority: Medium     Herniated lumbar intervertebral disc 12/05/2014     Priority: Medium     Constipation 02/07/2014     Priority: Medium     Overview:   Chronic       Past Medical History:   Diagnosis Date     Other constipation     No Comments Provided     Personal history of other medical treatment (CODE)     No Comments Provided     Past Surgical History:   Procedure Laterality Date     EXTRACTION(S) DENTAL      No Comments Provided     VASECTOMY      1992     Current Outpatient Prescriptions   Medication Sig Dispense Refill     blood glucose monitoring (ONETOUCH VERIO IQ) test strip Dispense item covered by pt ins. E11.65 NIDDM type II, controlled  - Test 2 times/day.  Reason: diabetes 100 strip 11     meloxicam (MOBIC) 15 MG tablet TAKE ONE TABLET BY MOUTH ONCE DAILY 90 tablet 0     insulin aspart (NOVOLOG FLEXPEN) 100 UNIT/ML injection Correction scale: 1 unit for 150-199, 2 units for 200-249, 3 units for 250-299, 4 units for > 300. 30 mL 3     dulaglutide (TRULICITY) 1.5 MG/0.5ML pen Inject 1.5 mg Subcutaneous every 7 days 4 mL 3     aspirin EC 81 MG EC tablet Take 81 mg by mouth daily with food       Lancets 30G MISC Dispense item covered by pt ins. E11.65 NIDDM type II, uncontrolled - Test 4 times/day. Reason: New diabetes       lisinopril (PRINIVIL/ZESTRIL) 2.5 MG tablet Take 2.5 mg by mouth daily       polyethylene glycol (MIRALAX/GLYCOLAX) powder Take 17 g by mouth 2 times daily       simvastatin (ZOCOR) 20 MG tablet Take 20 mg by mouth At Bedtime       Allergies   Allergen Reactions     Metformin Nausea and Other (See Comments)     Abdominal cramping     Family History   Problem Relation Age of Onset     DIABETES Mother      Diabetes     DIABETES Father      Diabetes     CANCER Father      Cancer,Skin, throat     Anesthesia Reaction No family hx of      CLOTTING DISORDER No family hx of      CEREBROVASCULAR DISEASE No family hx of      Social History     Social History     Marital status:      Spouse name: N/A     Number of children: N/A     Years of education: N/A     Social History Main Topics     Smoking status: Former Smoker     Packs/day: 0.50     Years: 20.00     Types: Cigarettes     Quit date: 8/30/2015     Smokeless tobacco: Never Used      Comment: Quit smoking: has chantix     Alcohol use 0.6 oz/week      Comment: Alcoholic Drinks/day: rare     Drug use: None      Comment: Drug use: No     Sexual activity: Not Asked     Other Topics Concern     None     Social History Narrative    Unemployed       REVIEW OF SYSTEMS:  Review of Systems:  Skin: negative  Eyes: negative  Ears/Nose/Throat: negative  Respiratory: He has had a little bit of a cold lately with  "a cough.  Cardiovascular: negative  Gastrointestinal: negative  Genitourinary: negative  Musculoskeletal: negative  Neurologic: negative  Psychiatric: negative  Hematologic/Lymphatic/Immunologic: negative  Endocrine: See HPI      EXAM:   Vitals: reviewed in EMR.  /74 (BP Location: Right arm, Patient Position: Sitting, Cuff Size: Adult Regular)  Pulse 78  Resp 18  Ht 5' 8\" (1.727 m)  Wt 129 lb 6.4 oz (58.7 kg)  SpO2 100%  BMI 19.68 kg/m2    Gen: Pleasant male, NAD.  HEENT: MMM, no OP erythema.   Neck: Supple, no JVD, no bruits.  CV: RRR no m/r/g.   Pulm: CTAB no w/r/r  Neuro: Grossly intact  Msk: No lower extremity edema.  Skin: No concerning lesions.  Psychiatric: Normal affect and insight. Does not appear anxious or depressed.    Foot Exam:  3/12/2018  Intact tomonofilament bilaterally.  Skin intact without erythema.      DIAGNOSTICS:   Results for orders placed or performed in visit on 03/12/18   Basic metabolic panel  (Ca, Cl, CO2, Creat, Gluc, K, Na, BUN)   Result Value Ref Range    Sodium 138 134 - 144 mmol/L    Potassium 4.0 3.5 - 5.1 mmol/L    Chloride 105 98 - 107 mmol/L    Carbon Dioxide 28 21 - 31 mmol/L    Anion Gap 5 3 - 14 mmol/L    Glucose 167 (H) 70 - 105 mg/dL    Urea Nitrogen 13 7 - 25 mg/dL    Creatinine 0.99 0.70 - 1.30 mg/dL    GFR Estimate 81 >60 mL/min/1.7m2    GFR Estimate If Black >90 >60 mL/min/1.7m2    Calcium 8.9 8.6 - 10.3 mg/dL   Hemoglobin A1c   Result Value Ref Range    Hemoglobin A1C 7.1 (H) 4.0 - 6.0 %   CBC with platelets   Result Value Ref Range    WBC 9.0 4.0 - 11.0 10e9/L    RBC Count 4.35 (L) 4.4 - 5.9 10e12/L    Hemoglobin 13.1 (L) 13.3 - 17.7 g/dL    Hematocrit 39.1 (L) 40.0 - 53.0 %    MCV 90 78 - 100 fl    MCH 30.1 26.5 - 33.0 pg    MCHC 33.5 31.5 - 36.5 g/dL    RDW 13.0 10.0 - 15.0 %    Platelet Count 281 150 - 450 10e9/L   EKG 12-lead, tracing only    Narrative    EKG Interpretation:   Rhythm: Sinus  Rate: 68  Axis: leftward  Conduction: RSR pattern in lead " V1  QRS: Normal  ST Segments: Normal  T-wave: Normal  Chambers: Normal  PACs Present: No  PVCs Present: No    Impression:   Normal EKG.  Compared to January 22, 2014: Unchanged.    Signed, Adebayo Dubois MD  Internal Medicine & Pediatrics  3/12/2018  9:04 AM           IMPRESSION:     ICD-10-CM    1. Pre-op exam Z01.818 CBC with platelets     EKG 12-lead, tracing only   2. Non-recurrent bilateral inguinal hernia without obstruction or gangrene K40.20    3. Controlled type 2 diabetes mellitus without complication, without long-term current use of insulin (H) E11.9 Basic metabolic panel  (Ca, Cl, CO2, Creat, Gluc, K, Na, BUN)     Hemoglobin A1c     blood glucose monitoring (ONETOUCH VERIO IQ) test strip   4. History of tobacco use disorder Z87.891    5. Lipid screening Z13.220 **Lipid Profile (Chol, Trig, HDL, LDL calc) anytime - FASTING       For above listed surgery and anesthesia:   Patient is at increased risk forperioperative complications based on diabetes mellitus type 2.    RECOMMENDATIONS:   APPROVAL GIVEN to proceed with proposed procedure, without further diagnostic evaluation    Patient is on chronic pain medications: No  Patient is on antiplatelet/anticoagulation: Yes  Other medications that need adjustment perioperatively: Yes  Patient Instructions    -- No change to Trulicity   -- Check your sugar more frequently day of surgery as you could dip low without eating   -- Don't take meloxicam within a week before surgery   -- Hold aspirin, Advil/ibuprofen, Aleve/naproxen for 7 days before surgery   -- Acetaminophen (Tylenol) is okay   -- Hold vitamins and herbal remedies for 7 days before surgery        Signed, Adebayo Dubois MD  Internal Medicine & Pediatrics

## 2018-03-12 NOTE — TELEPHONE ENCOUNTER
Patient has clearance from Dr. Dubois for surgery.  Scheduled with Dr. rico on 3/20.  Patient has eleazar wipes and literature.  Herlinda Bonilla LPN..........3/12/2018  12:13 PM

## 2018-03-12 NOTE — NURSING NOTE
"Date of Surgery: does not know yet  Type of Surgery: Double hernia surgery  Surgeon: Dr. Calzada  Hospital:  [facility]  Fax: none    Fever/Chills or other infectious symptoms in past month: no  >10lb weight loss in past two months: no  O2 SAT: 100%    Health Care Directive/Code status:  None on file/ Full code  Hx of blood transfusions:   no  Td up to date:  yes  History of VRE/MRSA:  no Date: N/A    Preoperative Evaluation: Obstructive Sleep Apnea screening    S: Snore -  Do you snore loudly? (louder than talking or loud enough to be heard through closed doors) no  T: Tired - Do you often feel tired, fatigued, or sleepy during the daytime? no  O: Observed - Has anyone ever observed you stop breathing during your sleep? no  P: Pressure - Do you have or are you being treated for high blood pressure? no  B: BMI - BMI greater than 35kg/m2? no  A: Age - Age over 50 years old? no  N: Neck - Neck circumference greater than 40 cm? no  G: Gender - Gender: Male? yes    Total number of \"YES\" responses:  1    Scoring: Low risk of PALLAVI 0-2  At Risk of PALLAVI: >3 High Risk of PALLAVI: 5-8    Denice Camargo 3/12/2018 8:12 AM    "

## 2018-03-12 NOTE — PATIENT INSTRUCTIONS
-- No change to Trulicity   -- Check your sugar more frequently day of surgery as you could dip low without eating   -- Don't take meloxicam within a week before surgery   -- Hold aspirin, Advil/ibuprofen, Aleve/naproxen for 7 days before surgery   -- Acetaminophen (Tylenol) is okay   -- Hold vitamins and herbal remedies for 7 days before surgery

## 2018-03-12 NOTE — NURSING NOTE
Previous A1C is not at goal of <8  No results found for: A1C  Urine microalbumin:creatine: none on file  Foot exam-needs one today.   Eye exam-Oct- 2017    Tobacco User none  Patient is on a daily aspirin  Patient is on a Statin.  Blood pressure today of:     BP Readings from Last 1 Encounters:   03/09/18 118/87      is at the goal of <139/89 for diabetics.    Denice Camargo LPN on 3/12/2018 at 7:44 AM

## 2018-03-12 NOTE — LETTER
March 12, 2018      Rick Contreras     [place] [id number]        Dear ,    We are writing to inform you of your test results.    A1c much improved!! Good luck with surgery and recovery.    Resulted Orders   Basic metabolic panel  (Ca, Cl, CO2, Creat, Gluc, K, Na, BUN)   Result Value Ref Range    Sodium 138 134 - 144 mmol/L    Potassium 4.0 3.5 - 5.1 mmol/L    Chloride 105 98 - 107 mmol/L    Carbon Dioxide 28 21 - 31 mmol/L    Anion Gap 5 3 - 14 mmol/L    Glucose 167 (H) 70 - 105 mg/dL    Urea Nitrogen 13 7 - 25 mg/dL    Creatinine 0.99 0.70 - 1.30 mg/dL    GFR Estimate 81 >60 mL/min/1.7m2    GFR Estimate If Black >90 >60 mL/min/1.7m2    Calcium 8.9 8.6 - 10.3 mg/dL   Hemoglobin A1c   Result Value Ref Range    Hemoglobin A1C 7.1 (H) 4.0 - 6.0 %   CBC with platelets   Result Value Ref Range    WBC 9.0 4.0 - 11.0 10e9/L    RBC Count 4.35 (L) 4.4 - 5.9 10e12/L    Hemoglobin 13.1 (L) 13.3 - 17.7 g/dL    Hematocrit 39.1 (L) 40.0 - 53.0 %    MCV 90 78 - 100 fl    MCH 30.1 26.5 - 33.0 pg    MCHC 33.5 31.5 - 36.5 g/dL    RDW 13.0 10.0 - 15.0 %    Platelet Count 281 150 - 450 10e9/L       If you have any questions or concerns, please call the clinic at the number listed above.       Sincerely,        Adebayo Dubois MD

## 2018-03-12 NOTE — PROGRESS NOTES
Please schedule colonoscopy for rectal bleeding and Bilateral inguinal hernia repairs the next day.

## 2018-03-19 ENCOUNTER — ANESTHESIA EVENT (OUTPATIENT)
Dept: SURGERY | Facility: OTHER | Age: 48
End: 2018-03-19
Payer: COMMERCIAL

## 2018-03-20 ENCOUNTER — SURGERY (OUTPATIENT)
Age: 48
End: 2018-03-20

## 2018-03-20 ENCOUNTER — HOSPITAL ENCOUNTER (OUTPATIENT)
Facility: OTHER | Age: 48
Discharge: HOME | End: 2018-03-20
Attending: SURGERY | Admitting: SURGERY
Payer: COMMERCIAL

## 2018-03-20 ENCOUNTER — ANESTHESIA (OUTPATIENT)
Dept: SURGERY | Facility: OTHER | Age: 48
End: 2018-03-20
Payer: COMMERCIAL

## 2018-03-20 VITALS
OXYGEN SATURATION: 100 % | TEMPERATURE: 97.3 F | DIASTOLIC BLOOD PRESSURE: 78 MMHG | SYSTOLIC BLOOD PRESSURE: 109 MMHG | RESPIRATION RATE: 14 BRPM

## 2018-03-20 DIAGNOSIS — K40.20 NON-RECURRENT BILATERAL INGUINAL HERNIA WITHOUT OBSTRUCTION OR GANGRENE: Primary | ICD-10-CM

## 2018-03-20 PROCEDURE — 37000008 ZZH ANESTHESIA TECHNICAL FEE, 1ST 30 MIN: Performed by: SURGERY

## 2018-03-20 PROCEDURE — 25000128 H RX IP 250 OP 636: Performed by: SURGERY

## 2018-03-20 PROCEDURE — 27210995 ZZH RX 272: Performed by: SURGERY

## 2018-03-20 PROCEDURE — 71000027 ZZH RECOVERY PHASE 2 EACH 15 MINS: Performed by: SURGERY

## 2018-03-20 PROCEDURE — 36000052 ZZH SURGERY LEVEL 2 EA 15 ADDTL MIN: Performed by: SURGERY

## 2018-03-20 PROCEDURE — 25000128 H RX IP 250 OP 636: Performed by: NURSE ANESTHETIST, CERTIFIED REGISTERED

## 2018-03-20 PROCEDURE — 25000132 ZZH RX MED GY IP 250 OP 250 PS 637: Performed by: SURGERY

## 2018-03-20 PROCEDURE — 25000125 ZZHC RX 250: Performed by: SURGERY

## 2018-03-20 PROCEDURE — 27210794 ZZH OR GENERAL SUPPLY STERILE: Performed by: SURGERY

## 2018-03-20 PROCEDURE — 25000125 ZZHC RX 250: Performed by: NURSE ANESTHETIST, CERTIFIED REGISTERED

## 2018-03-20 PROCEDURE — 37000009 ZZH ANESTHESIA TECHNICAL FEE, EACH ADDTL 15 MIN: Performed by: SURGERY

## 2018-03-20 PROCEDURE — 49505 PRP I/HERN INIT REDUC >5 YR: CPT | Mod: 50 | Performed by: SURGERY

## 2018-03-20 PROCEDURE — 36000050 ZZH SURGERY LEVEL 2 1ST 30 MIN: Performed by: SURGERY

## 2018-03-20 PROCEDURE — 49505 PRP I/HERN INIT REDUC >5 YR: CPT | Performed by: NURSE ANESTHETIST, CERTIFIED REGISTERED

## 2018-03-20 PROCEDURE — C1781 MESH (IMPLANTABLE): HCPCS | Performed by: SURGERY

## 2018-03-20 PROCEDURE — 40000306 ZZH STATISTIC PRE PROC ASSESS II: Performed by: SURGERY

## 2018-03-20 PROCEDURE — 71000014 ZZH RECOVERY PHASE 1 LEVEL 2 FIRST HR: Performed by: SURGERY

## 2018-03-20 DEVICE — MESH HERNIA PROLITE 4X6" 1000406-00: Type: IMPLANTABLE DEVICE | Site: INGUINAL | Status: FUNCTIONAL

## 2018-03-20 RX ORDER — CEFAZOLIN SODIUM 2 G/100ML
2 INJECTION, SOLUTION INTRAVENOUS
Status: COMPLETED | OUTPATIENT
Start: 2018-03-20 | End: 2018-03-20

## 2018-03-20 RX ORDER — LIDOCAINE HYDROCHLORIDE 20 MG/ML
INJECTION, SOLUTION INFILTRATION; PERINEURAL PRN
Status: DISCONTINUED | OUTPATIENT
Start: 2018-03-20 | End: 2018-03-20

## 2018-03-20 RX ORDER — DEXAMETHASONE SODIUM PHOSPHATE 4 MG/ML
INJECTION, SOLUTION INTRA-ARTICULAR; INTRALESIONAL; INTRAMUSCULAR; INTRAVENOUS; SOFT TISSUE PRN
Status: DISCONTINUED | OUTPATIENT
Start: 2018-03-20 | End: 2018-03-20

## 2018-03-20 RX ORDER — ONDANSETRON 4 MG/1
4 TABLET, ORALLY DISINTEGRATING ORAL EVERY 30 MIN PRN
Status: DISCONTINUED | OUTPATIENT
Start: 2018-03-20 | End: 2018-03-20 | Stop reason: HOSPADM

## 2018-03-20 RX ORDER — ACETAMINOPHEN 10 MG/ML
INJECTION, SOLUTION INTRAVENOUS PRN
Status: DISCONTINUED | OUTPATIENT
Start: 2018-03-20 | End: 2018-03-20

## 2018-03-20 RX ORDER — NALOXONE HYDROCHLORIDE 0.4 MG/ML
.1-.4 INJECTION, SOLUTION INTRAMUSCULAR; INTRAVENOUS; SUBCUTANEOUS
Status: DISCONTINUED | OUTPATIENT
Start: 2018-03-20 | End: 2018-03-20 | Stop reason: HOSPADM

## 2018-03-20 RX ORDER — SODIUM CHLORIDE, SODIUM LACTATE, POTASSIUM CHLORIDE, CALCIUM CHLORIDE 600; 310; 30; 20 MG/100ML; MG/100ML; MG/100ML; MG/100ML
INJECTION, SOLUTION INTRAVENOUS CONTINUOUS
Status: DISCONTINUED | OUTPATIENT
Start: 2018-03-20 | End: 2018-03-20 | Stop reason: HOSPADM

## 2018-03-20 RX ORDER — ACETAMINOPHEN 325 MG/1
650 TABLET ORAL 4 TIMES DAILY
Refills: 0 | COMMUNITY
Start: 2018-03-20 | End: 2018-10-01

## 2018-03-20 RX ORDER — FENTANYL CITRATE 50 UG/ML
25-50 INJECTION, SOLUTION INTRAMUSCULAR; INTRAVENOUS EVERY 5 MIN PRN
Status: DISCONTINUED | OUTPATIENT
Start: 2018-03-20 | End: 2018-03-20 | Stop reason: HOSPADM

## 2018-03-20 RX ORDER — KETAMINE HYDROCHLORIDE 10 MG/ML
INJECTION INTRAMUSCULAR; INTRAVENOUS PRN
Status: DISCONTINUED | OUTPATIENT
Start: 2018-03-20 | End: 2018-03-20

## 2018-03-20 RX ORDER — HYDROMORPHONE HYDROCHLORIDE 1 MG/ML
.3-.5 INJECTION, SOLUTION INTRAMUSCULAR; INTRAVENOUS; SUBCUTANEOUS EVERY 10 MIN PRN
Status: DISCONTINUED | OUTPATIENT
Start: 2018-03-20 | End: 2018-03-20 | Stop reason: HOSPADM

## 2018-03-20 RX ORDER — FENTANYL CITRATE 50 UG/ML
INJECTION, SOLUTION INTRAMUSCULAR; INTRAVENOUS PRN
Status: DISCONTINUED | OUTPATIENT
Start: 2018-03-20 | End: 2018-03-20

## 2018-03-20 RX ORDER — PROPOFOL 10 MG/ML
INJECTION, EMULSION INTRAVENOUS PRN
Status: DISCONTINUED | OUTPATIENT
Start: 2018-03-20 | End: 2018-03-20

## 2018-03-20 RX ORDER — MAGNESIUM HYDROXIDE 1200 MG/15ML
LIQUID ORAL PRN
Status: DISCONTINUED | OUTPATIENT
Start: 2018-03-20 | End: 2018-03-20 | Stop reason: HOSPADM

## 2018-03-20 RX ORDER — ONDANSETRON 2 MG/ML
4 INJECTION INTRAMUSCULAR; INTRAVENOUS EVERY 30 MIN PRN
Status: DISCONTINUED | OUTPATIENT
Start: 2018-03-20 | End: 2018-03-20 | Stop reason: HOSPADM

## 2018-03-20 RX ORDER — MEPERIDINE HYDROCHLORIDE 50 MG/ML
12.5 INJECTION INTRAMUSCULAR; INTRAVENOUS; SUBCUTANEOUS
Status: DISCONTINUED | OUTPATIENT
Start: 2018-03-20 | End: 2018-03-20 | Stop reason: HOSPADM

## 2018-03-20 RX ORDER — DEXAMETHASONE SODIUM PHOSPHATE 4 MG/ML
4 INJECTION, SOLUTION INTRA-ARTICULAR; INTRALESIONAL; INTRAMUSCULAR; INTRAVENOUS; SOFT TISSUE EVERY 10 MIN PRN
Status: DISCONTINUED | OUTPATIENT
Start: 2018-03-20 | End: 2018-03-20 | Stop reason: HOSPADM

## 2018-03-20 RX ORDER — LIDOCAINE 40 MG/G
CREAM TOPICAL
Status: DISCONTINUED | OUTPATIENT
Start: 2018-03-20 | End: 2018-03-20 | Stop reason: HOSPADM

## 2018-03-20 RX ORDER — BUPIVACAINE HYDROCHLORIDE 2.5 MG/ML
INJECTION, SOLUTION EPIDURAL; INFILTRATION; INTRACAUDAL PRN
Status: DISCONTINUED | OUTPATIENT
Start: 2018-03-20 | End: 2018-03-20 | Stop reason: HOSPADM

## 2018-03-20 RX ORDER — PROPOFOL 10 MG/ML
INJECTION, EMULSION INTRAVENOUS CONTINUOUS PRN
Status: DISCONTINUED | OUTPATIENT
Start: 2018-03-20 | End: 2018-03-20

## 2018-03-20 RX ORDER — OXYCODONE AND ACETAMINOPHEN 5; 325 MG/1; MG/1
1 TABLET ORAL
Status: COMPLETED | OUTPATIENT
Start: 2018-03-20 | End: 2018-03-20

## 2018-03-20 RX ORDER — OXYCODONE AND ACETAMINOPHEN 5; 325 MG/1; MG/1
1 TABLET ORAL EVERY 6 HOURS PRN
Qty: 18 TABLET | Refills: 0 | Status: SHIPPED | OUTPATIENT
Start: 2018-03-20 | End: 2018-06-07

## 2018-03-20 RX ORDER — KETOROLAC TROMETHAMINE 30 MG/ML
30 INJECTION, SOLUTION INTRAMUSCULAR; INTRAVENOUS EVERY 6 HOURS PRN
Status: DISCONTINUED | OUTPATIENT
Start: 2018-03-20 | End: 2018-03-20 | Stop reason: HOSPADM

## 2018-03-20 RX ORDER — ONDANSETRON 2 MG/ML
INJECTION INTRAMUSCULAR; INTRAVENOUS PRN
Status: DISCONTINUED | OUTPATIENT
Start: 2018-03-20 | End: 2018-03-20

## 2018-03-20 RX ORDER — IBUPROFEN 600 MG/1
600 TABLET, FILM COATED ORAL 4 TIMES DAILY
Refills: 0 | COMMUNITY
Start: 2018-03-20 | End: 2018-10-01

## 2018-03-20 RX ADMIN — SODIUM CHLORIDE 100 ML: 900 IRRIGANT IRRIGATION at 10:07

## 2018-03-20 RX ADMIN — PROPOFOL 225 MCG/KG/MIN: 10 INJECTION, EMULSION INTRAVENOUS at 08:57

## 2018-03-20 RX ADMIN — SODIUM CHLORIDE, SODIUM LACTATE, POTASSIUM CHLORIDE, AND CALCIUM CHLORIDE: 600; 310; 30; 20 INJECTION, SOLUTION INTRAVENOUS at 07:55

## 2018-03-20 RX ADMIN — ONDANSETRON HYDROCHLORIDE 4 MG: 2 SOLUTION INTRAMUSCULAR; INTRAVENOUS at 08:56

## 2018-03-20 RX ADMIN — FENTANYL CITRATE 25 MCG: 50 INJECTION, SOLUTION INTRAMUSCULAR; INTRAVENOUS at 09:16

## 2018-03-20 RX ADMIN — LIDOCAINE HYDROCHLORIDE 80 MG: 20 INJECTION, SOLUTION INFILTRATION; PERINEURAL at 08:56

## 2018-03-20 RX ADMIN — DEXAMETHASONE SODIUM PHOSPHATE 4 MG: 4 INJECTION, SOLUTION INTRAMUSCULAR; INTRAVENOUS at 09:04

## 2018-03-20 RX ADMIN — ACETAMINOPHEN 1000 MG: 10 INJECTION, SOLUTION INTRAVENOUS at 09:07

## 2018-03-20 RX ADMIN — FENTANYL CITRATE 25 MCG: 50 INJECTION, SOLUTION INTRAMUSCULAR; INTRAVENOUS at 09:51

## 2018-03-20 RX ADMIN — Medication 30 MG: at 09:11

## 2018-03-20 RX ADMIN — MIDAZOLAM HYDROCHLORIDE 2 MG: 1 INJECTION, SOLUTION INTRAMUSCULAR; INTRAVENOUS at 08:51

## 2018-03-20 RX ADMIN — BUPIVACAINE HYDROCHLORIDE 40 ML: 2.5 INJECTION, SOLUTION EPIDURAL; INFILTRATION; INTRACAUDAL; PERINEURAL at 10:07

## 2018-03-20 RX ADMIN — OXYCODONE HYDROCHLORIDE AND ACETAMINOPHEN 1 TABLET: 5; 325 TABLET ORAL at 11:59

## 2018-03-20 RX ADMIN — FENTANYL CITRATE 25 MCG: 50 INJECTION, SOLUTION INTRAMUSCULAR; INTRAVENOUS at 10:06

## 2018-03-20 RX ADMIN — PROPOFOL 200 MG: 10 INJECTION, EMULSION INTRAVENOUS at 08:56

## 2018-03-20 RX ADMIN — MIDAZOLAM HYDROCHLORIDE 2 MG: 1 INJECTION, SOLUTION INTRAMUSCULAR; INTRAVENOUS at 08:39

## 2018-03-20 RX ADMIN — CEFAZOLIN SODIUM 2 G: 2 INJECTION, SOLUTION INTRAVENOUS at 08:55

## 2018-03-20 NOTE — PROGRESS NOTES
Patient discharged at this time with parents, taken by wheelchair to door and then ambulatory to car.  Ambulated in heath without difficulty prior to discharge, abdominal dressing x2 clean and dry, Reusable ice bag sent home with patient.  Discharge instructions reviewed, patient and family voice understanding.

## 2018-03-20 NOTE — OP NOTE
PREOPERATIVE DIAGNOSIS: bilateral  inguinal hernia.      POSTOPERATIVE DIAGNOSIS:bilateral  inguinal hernia.      PROCEDURE PERFORMED: bilateral  inguinal herniorrhaphy with ProLite mesh.     SURGEON: JACKI Melo    ASSISTANT:  PITA Patel CST    ANESTHESIA:General anesthesia , PETER Willoughby CRNA    INDICATION FOR THE PROCEDURE:  The patient is an 48 year old male with a  history of pain and a lump in the bilateral  groin.  This is worse with strenuous activity.Examination confirms a bulge in the bilateral  inguinal area that is reducible.       PROCEDURE:  After adequate anesthesia, the patient was prepped and draped inthe usual sterile fashion.  Local anesthetic consisting of  0.25% Marcaine plain was used throughout.  After adequate local anesthesia, a bilateral   inguinal incision was made, carried downthrough the subcutaneous tissues and hemostasis was obtained with cautery.  The aponeurosis of the external oblique was opened in the direction of its fibers.  The cord and structures were dissected off the pubic tubercle and held on traction.  The cremasteric fibers were divided.  The patient has an indirect hernia sac bilaterally.  This was dissected free from the cord and its structures down to the level of the internal ring.  The sac was empty so it was twisted, suture ligated with a 2-0 Vicryl suture, amputated and retracted deep to the internal ring.  There was a lipoma of the cord.  This was dissected down to the level of the internal ring where it was clamped,divided, and ligated with a 2-0 Vicryl suture.  A 4 x 6 inch piece of Prolite mesh was then secured with interrupted O- Vicryl sutures.  The medial most sutures were in the pubic tubercle, the inferior sutures in the Lyndon s ligament medially, the iliopubic tract laterally.  A slit was made for the cord and its structures, and the graft was secured laterally as well.  The ilioinguinal nerve was kept with the cord . Graft was secured superiorly  to the arch of the transversus abdominis.    There was good hemostasis.  The external oblique was closed with a running 2-0 Vicryl suture, Rosmery s with a 3-0 Vicryl suture, and the skin with a 5-0 Maxon intracuticular suture.  A Proxi-Strip and a clean, dry dressing was applied.  The bilateral  testicle was pulled down into the bilateral  hemiscrotum and the patient taken to PACU in stable condition.    Immanuel Calzada MD on 3/20/2018 at 10:13 AM

## 2018-03-20 NOTE — INTERVAL H&P NOTE
The history and physical has been reviewed and the patient has been examined.  There are no interim changes to the patient's history or physical condition. Cough improving.    Immanuel Calzada MD

## 2018-03-20 NOTE — OR NURSING
[facility]   Adult Discharge Orders & Instructions     For 24 hours after surgery    1. Get plenty of rest.  A responsible adult must stay with you for at least 24 hours after you leave the hospital.   2. Do not drive or use heavy equipment.  If you have weakness or tingling, don't drive or use heavy equipment until this feeling goes away.  3. Do not drink alcohol.  4. Avoid strenuous or risky activities.  Ask for help when climbing stairs.   5. You may feel lightheaded.  IF so, sit for a few minutes before standing.  Have someone help you get up.   6. If you have nausea (feel sick to your stomach): Drink only clear liquids such as apple juice, ginger ale, broth or 7-Up.  Rest may also help.  Be sure to drink enough fluids.  Move to a regular diet as you feel able.  7. You may have a slight fever. Call the doctor if your fever is over 100 F (37.7 C) (taken under the tongue) or lasts longer than 24 hours.  8. You may have a dry mouth, a sore throat, muscle aches or trouble sleeping.  These should go away after 24 hours.  9. Do not make important or legal decisions.   Call your doctor for any of the followin.  Signs of infection (fever, growing tenderness at the surgery site, a large amount of drainage or bleeding, severe pain, foul-smelling drainage, redness, swelling).    2. It has been over 8 to 10 hours since surgery and you are still not able to urinate (pass water).    3.  Headache for over 24 hours.    4.  Numbness, tingling or weakness the day after surgery (if you had spinal anesthesia).  To contact a doctor, call    [phone]___________________________

## 2018-03-20 NOTE — ANESTHESIA PREPROCEDURE EVALUATION
Anesthesia Evaluation     .             ROS/MED HX    ENT/Pulmonary:  - neg pulmonary ROS     Neurologic:  - neg neurologic ROS     Cardiovascular:  - neg cardiovascular ROS       METS/Exercise Tolerance:  >4 METS   Hematologic:         Musculoskeletal:  - neg musculoskeletal ROS       GI/Hepatic:     (+) GERD Asymptomatic on medication,       Renal/Genitourinary:  - ROS Renal section negative       Endo:     (+) type II DM .      Psychiatric:  - neg psychiatric ROS       Infectious Disease:  - neg infectious disease ROS       Malignancy:      - no malignancy   Other:    - neg other ROS                 Physical Exam      Airway   Mallampati: I  TM distance: >3 FB  Neck ROM: full    Dental   (+) missing    Cardiovascular   Rhythm and rate: regular and normal      Pulmonary    breath sounds clear to auscultation                    Anesthesia Plan      History & Physical Review      ASA Status:  2 .    NPO Status:  > 6 hours    Plan for General with Intravenous induction. Maintenance will be TIVA.    PONV prophylaxis:  Ondansetron (or other 5HT-3)       Postoperative Care  Postoperative pain management:  IV analgesics.      Consents  Anesthetic plan, risks, benefits and alternatives discussed with:  Patient..                          .

## 2018-03-20 NOTE — IP AVS SNAPSHOT
MRN:[id number]                      After Visit Summary   3/20/2018    Rick Contreras    MRN: [id number]           Thank you!     Thank you for choosing [facility] for your care. Our goal is always to provide you with excellent care. Hearing back from our patients is one way we can continue to improve our services. Please take a few minutes to complete the written survey that you may receive in the mail after you visit with us. Thank you!        Patient Information     Date Of Birth          1970        About your hospital stay     You were admitted on:  March 20, 2018 You last received care in the:  [facility]    You were discharged on:  March 20, 2018       Who to Call     For medical emergencies, please call 911.  For non-urgent questions about your medical care, please call your primary care provider or clinic, [phone]  For questions related to your surgery, please call your surgery clinic        Attending Provider     Provider Specialty    Immanuel Calzada MD Surgery       Primary Care Provider Office Phone # Fax #    Adebayo Aaron Dubois [phone]      After Care Instructions     Diet Instructions       Resume pre-procedure diet            Discharge Instructions       Follow up appointment with Immanuel Calzada MD FACS on 3/28/18 at 9:40 am            Dressing       Keep dressing clean and dry.  Remove dressing in 48 hours and you may then shower with wounds open. Proxi-strips will remain intact for 7-10 days . Once loose they may be removed.            Ice to affected area       Ice to operative site PRN            No driving or operating machinery        until the day after procedure and off narcotics            No lifting        No lifting over 20 lbs and no strenuous physical activity for 4 weeks                  Your next 10 appointments already scheduled     Mar 28, 2018  9:40 AM CDT   Return Visit with Immanuel Calzada MD   [facility] ([facility])    [address]  [place] [id number] [phone]              Pending Results     No orders found from 3/18/2018 to 3/21/2018.            Admission Information     Date & Time Provider Department Dept. Phone    3/20/2018 Immanuel Calzada MD [facility] [phone]      Your Vitals Were     Blood Pressure Temperature Respirations Pulse Oximetry          116/85 97.3  F (36.3  C) (Temporal) 14 98%        MyChart Information     [employer] lets you send messages to your doctor, view your test results, renew your prescriptions, schedule appointments and more. To sign up, go to [email or web address] . Click on \"Log in\" on the left side of the screen, which will take you to the Welcome page. Then click on \"Sign up Now\" on the right side of the page.     You will be asked to enter the access code listed below, as well as some personal information. Please follow the directions to create your username and password.     Your access code is: [email or web address]  Expires: 5/10/2018 11:28 AM     Your access code will  in 90 days. If you need help or a new code, please call your [place] clinic or [phone].        Care EveryWhere ID     This is your Care EveryWhere ID. This could be used by other organizations to access your [place] medical records  [id number]        Equal Access to Services     CHON JOHN AH: Hadii pollo Braxton, waaxda luqadaha, qaybta kaalmada med, bebeto felix. So [facility] [phone].    ATENCIÓN: Si habla español, tiene a shipley disposición servicios gratuitos de asistencia lingüística. Llame al [phone].    We comply with applicable federal civil rights laws and [place] laws. We do not discriminate on the basis of race, color, national origin, age, disability, sex, sexual orientation, or gender identity.               Review of your medicines      UNREVIEWED medicines. Ask " your doctor about these medicines        Dose / Directions    aspirin EC 81 MG EC tablet        Dose:  81 mg   Take 81 mg by mouth daily with food   Refills:  0       dulaglutide 1.5 MG/0.5ML pen   Commonly known as:  TRULICITY   Used for:  Uncontrolled type 2 diabetes mellitus without complication, without long-term current use of insulin (H)        Dose:  1.5 mg   Inject 1.5 mg Subcutaneous every 7 days   Quantity:  4 mL   Refills:  3       insulin aspart 100 UNIT/ML injection   Commonly known as:  NovoLOG FLEXPEN   Used for:  Uncontrolled type 2 diabetes mellitus without complication, without long-term current use of insulin (H)        Correction scale: 1 unit for 150-199, 2 units for 200-249, 3 units for 250-299, 4 units for > 300.   Quantity:  30 mL   Refills:  3       lisinopril 2.5 MG tablet   Commonly known as:  PRINIVIL/Zestril        Dose:  2.5 mg   Take 2.5 mg by mouth daily   Refills:  0       meloxicam 15 MG tablet   Commonly known as:  MOBIC   Used for:  Displacement of lumbar intervertebral disc without myelopathy        TAKE ONE TABLET BY MOUTH ONCE DAILY   Quantity:  90 tablet   Refills:  0       polyethylene glycol powder   Commonly known as:  MIRALAX/GLYCOLAX        Dose:  17 g   Take 17 g by mouth 2 times daily   Refills:  0       simvastatin 20 MG tablet   Commonly known as:  ZOCOR        Dose:  20 mg   Take 20 mg by mouth At Bedtime   Refills:  0         START taking        Dose / Directions    acetaminophen 325 MG tablet   Commonly known as:  TYLENOL   Used for:  Non-recurrent bilateral inguinal hernia without obstruction or gangrene        Dose:  650 mg   Take 2 tablets (650 mg) by mouth 4 times daily   Refills:  0       ibuprofen 600 MG tablet   Commonly known as:  ADVIL/MOTRIN   Used for:  Non-recurrent bilateral inguinal hernia without obstruction or gangrene        Dose:  600 mg   Take 1 tablet (600 mg) by mouth 4 times daily   Refills:  0       oxyCODONE-acetaminophen 5-325 MG per tablet    Commonly known as:  PERCOCET   Used for:  Non-recurrent bilateral inguinal hernia without obstruction or gangrene        Dose:  1 tablet   Take 1 tablet by mouth every 6 hours as needed for moderate to severe pain   Quantity:  18 tablet   Refills:  0         CONTINUE these medicines which have NOT CHANGED        Dose / Directions    blood glucose monitoring test strip   Commonly known as:  ONETOUCH VERIO IQ   Used for:  Controlled type 2 diabetes mellitus without complication, without long-term current use of insulin (H)        Dispense item covered by pt ins. E11.65 NIDDM type II, controlled  - Test 2 times/day. Reason: diabetes   Quantity:  100 strip   Refills:  11       Lancets 30G Misc        Dispense item covered by pt ins. E11.65 NIDDM type II, uncontrolled - Test 4 times/day. Reason: New diabetes   Refills:  0            Where to get your medicines      Some of these will need a paper prescription and others can be bought over the counter. Ask your nurse if you have questions.     Bring a paper prescription for each of these medications     oxyCODONE-acetaminophen 5-325 MG per tablet       You don't need a prescription for these medications     acetaminophen 325 MG tablet    ibuprofen 600 MG tablet                Protect others around you: Learn how to safely use, store and throw away your medicines at www.disposemymeds.org.        Information about OPIOIDS     PRESCRIPTION OPIOIDS: WHAT YOU NEED TO KNOW    Prescription opioids can be used to help relieve moderate to severe pain and are often prescribed following a surgery or injury, or for certain health conditions. These medications can be an important part of treatment but also come with serious risks. It is important to work with your health care provider to make sure you are getting the safest, most effective care.    WHAT ARE THE RISKS AND SIDE EFFECTS OF OPIOID USE?  Prescription opioids carry serious risks of addiction and overdose, especially with  prolonged use. An opioid overdose, often marked by slowed breathing can cause sudden death. The use of prescription opioids can have a number of side effects as well, even when taken as directed:      Tolerance - meaning you might need to take more of a medication for the same pain relief    Physical dependence - meaning you have symptoms of withdrawal when a medication is stopped    Increased sensitivity to pain    Constipation    Nausea, vomiting, and dry mouth    Sleepiness and dizziness    Confusion    Depression    Low levels of testosterone that can result in lower sex drive, energy, and strength    Itching and sweating    RISKS ARE GREATER WITH:    History of drug misuse, substance use disorder, or overdose    Mental health conditions (such as depression or anxiety)    Sleep apnea    Older age (65 years or older)    Pregnancy    Avoid alcohol while taking prescription opioids.   Also, unless specifically advised by your health care provider, medications to avoid include:    Benzodiazepines (such as Xanax or Valium)    Muscle relaxants (such as Soma or Flexeril)    Hypnotics (such as Ambien or Lunesta)    Other prescription opioids    KNOW YOUR OPTIONS:  Talk to your health care provider about ways to manage your pain that do not involve prescription opioids. Some of these options may actually work better and have fewer risks and side effects:    Pain relievers such as acetaminophen, ibuprofen, and naproxen    Some medications that are also used for depression or seizures    Physical therapy and exercise    Cognitive behavioral therapy, a psychological, goal-directed approach, in which patients learn how to modify physical, behavioral, and emotional triggers of pain and stress    IF YOU ARE PRESCRIBED OPIOIDS FOR PAIN:    Never take opioids in greater amounts or more often than prescribed    Follow up with your primary health care provider and work together to create a plan on how to manage your pain.    Talk  about ways to help manage your pain that do not involve prescription opioids    Talk about all concerns and side effects    Help prevent misuse and abuse    Never sell or share prescription opioids    Never use another person's prescription opioids    Store prescription opioids in a secure place and out of reach of others (this may include visitors, children, friends, and family)    Visit www.cdc.gov/drugoverdose to learn about risks of opioid abuse and overdose    If you believe you may be struggling with addiction, tell your health care provider and ask for guidance or call [facility]'s National Helpline at 9-718-511-HELP    LEARN MORE / www.cdc.gov/drugoverdose/prescribing/guideline.html    Safely dispose of unused prescription opioids: Find your local drug take-back programs and more information about the importance of safe disposal at www.doseofreality.[place].gov             Medication List: This is a list of all your medications and when to take them. Check marks below indicate your daily home schedule. Keep this list as a reference.      Medications           Morning Afternoon Evening Bedtime As Needed    acetaminophen 325 MG tablet   Commonly known as:  TYLENOL   Take 2 tablets (650 mg) by mouth 4 times daily                                aspirin EC 81 MG EC tablet   Take 81 mg by mouth daily with food                                blood glucose monitoring test strip   Commonly known as:  ONETOUCH VERIO IQ   Dispense item covered by pt ins. E11.65 NIDDM type II, controlled  - Test 2 times/day. Reason: diabetes                                dulaglutide 1.5 MG/0.5ML pen   Commonly known as:  TRULICITY   Inject 1.5 mg Subcutaneous every 7 days                                ibuprofen 600 MG tablet   Commonly known as:  ADVIL/MOTRIN   Take 1 tablet (600 mg) by mouth 4 times daily                                insulin aspart 100 UNIT/ML injection   Commonly known as:  NovoLOG FLEXPEN   Correction scale: 1 unit for  150-199, 2 units for 200-249, 3 units for 250-299, 4 units for > 300.                                Lancets 30G Misc   Dispense item covered by pt ins. E11.65 NIDDM type II, uncontrolled - Test 4 times/day. Reason: New diabetes                                lisinopril 2.5 MG tablet   Commonly known as:  PRINIVIL/Zestril   Take 2.5 mg by mouth daily                                meloxicam 15 MG tablet   Commonly known as:  MOBIC   TAKE ONE TABLET BY MOUTH ONCE DAILY                                oxyCODONE-acetaminophen 5-325 MG per tablet   Commonly known as:  PERCOCET   Take 1 tablet by mouth every 6 hours as needed for moderate to severe pain                                polyethylene glycol powder   Commonly known as:  MIRALAX/GLYCOLAX   Take 17 g by mouth 2 times daily                                simvastatin 20 MG tablet   Commonly known as:  ZOCOR   Take 20 mg by mouth At Bedtime

## 2018-03-20 NOTE — H&P (VIEW-ONLY)
"PREOPERATIVE HISTORY & PHYSICAL  Date of Exam: 3/12/2018  Chief Complaint   Patient presents with     Diabetes     Pre-Op Exam     double hernia repair       Nursing Notes:   Denice Camargo LPN  3/12/2018  7:51 AM  Signed  Previous A1C is not at goal of <8  No results found for: A1C  Urine microalbumin:creatine: none on file  Foot exam-needs one today.   Eye exam-Oct- 2017    Tobacco User none  Patient is on a daily aspirin  Patient is on a Statin.  Blood pressure today of:     BP Readings from Last 1 Encounters:   03/09/18 118/87      is at the goal of <139/89 for diabetics.    Denice Camargo LPN on 3/12/2018 at 7:44 AM        Denice Camargo LPN  3/12/2018  8:16 AM  Signed  Date of Surgery: does not know yet  Type of Surgery: Double hernia surgery  Surgeon: Dr. Calzada  [facility]:  [facility]  Fax: none    Fever/Chills or other infectious symptoms in past month: no  >10lb weight loss in past two months: no  O2 SAT: 100%    Health Care Directive/Code status:  None on file/ Full code  Hx of blood transfusions:   no  Td up to date:  yes  History of VRE/MRSA:  no Date: N/A    Preoperative Evaluation: Obstructive Sleep Apnea screening    S: Snore -  Do you snore loudly? (louder than talking or loud enough to be heard through closed doors) no  T: Tired - Do you often feel tired, fatigued, or sleepy during the daytime? no  O: Observed - Has anyone ever observed you stop breathing during your sleep? no  P: Pressure - Do you have or are you being treated for high blood pressure? no  B: BMI - BMI greater than 35kg/m2? no  A: Age - Age over 50 years old? no  N: Neck - Neck circumference greater than 40 cm? no  G: Gender - Gender: Male? yes    Total number of \"YES\" responses:  1    Scoring: Low risk of PALLAVI 0-2  At Risk of PALLAVI: >3 High Risk of PALLAVI: 5-8    Denice Camargo 3/12/2018 8:12 AM      HPI:  I was asked to see Mr. Rick MISHRA Skkatie by Dr. Calzada for preoperative management of diabetes.    Rick Contreras is a 48 " year old male with a history of diabetes here for preoperative examination.  He has had a significant improvement in his blood glucose control.  Over the weekend he was seen in the emergency department because he was worried about lows.  We had increased to elicited up to 1.5 mg weekly, and he was also taking glipizide 10 mg twice daily.  He had started no blood sugars down to 70 at home.  By the time he got to the ER he had held his glipizide and blood sugars remained in the 95-1 10 range.  Since Friday he stopped taking glipizide and blood sugars have been well controlled.  Lowest he can recall a 78, highest to 35.  Mornings have all been under 130, and 2 hours after a meal typically 150-170..  The most physically active he has been over the past 2 weeks was: Lifting a heavy boat motor without chest pains or palpitations..    Patient Active Problem List    Diagnosis Date Noted     Controlled type 2 diabetes mellitus without complication, without long-term current use of insulin (H) 03/12/2018     Priority: Medium     History of tobacco use disorder 03/12/2018     Priority: Medium     Non-recurrent bilateral inguinal hernia 10/25/2017     Priority: Medium     Rectal bleeding 10/25/2017     Priority: Medium     Back pain, chronic 12/29/2014     Priority: Medium     Herniated lumbar intervertebral disc 12/05/2014     Priority: Medium     Constipation 02/07/2014     Priority: Medium     Overview:   Chronic       Past Medical History:   Diagnosis Date     Other constipation     No Comments Provided     Personal history of other medical treatment (CODE)     No Comments Provided     Past Surgical History:   Procedure Laterality Date     EXTRACTION(S) DENTAL      No Comments Provided     VASECTOMY      1992     Current Outpatient Prescriptions   Medication Sig Dispense Refill     blood glucose monitoring (ONETOUCH VERIO IQ) test strip Dispense item covered by pt ins. E11.65 NIDDM type II, controlled  - Test 2 times/day.  Reason: diabetes 100 strip 11     meloxicam (MOBIC) 15 MG tablet TAKE ONE TABLET BY MOUTH ONCE DAILY 90 tablet 0     insulin aspart (NOVOLOG FLEXPEN) 100 UNIT/ML injection Correction scale: 1 unit for 150-199, 2 units for 200-249, 3 units for 250-299, 4 units for > 300. 30 mL 3     dulaglutide (TRULICITY) 1.5 MG/0.5ML pen Inject 1.5 mg Subcutaneous every 7 days 4 mL 3     aspirin EC 81 MG EC tablet Take 81 mg by mouth daily with food       Lancets 30G MISC Dispense item covered by pt ins. E11.65 NIDDM type II, uncontrolled - Test 4 times/day. Reason: New diabetes       lisinopril (PRINIVIL/ZESTRIL) 2.5 MG tablet Take 2.5 mg by mouth daily       polyethylene glycol (MIRALAX/GLYCOLAX) powder Take 17 g by mouth 2 times daily       simvastatin (ZOCOR) 20 MG tablet Take 20 mg by mouth At Bedtime       Allergies   Allergen Reactions     Metformin Nausea and Other (See Comments)     Abdominal cramping     Family History   Problem Relation Age of Onset     DIABETES Mother      Diabetes     DIABETES Father      Diabetes     CANCER Father      Cancer,Skin, throat     Anesthesia Reaction No family hx of      CLOTTING DISORDER No family hx of      CEREBROVASCULAR DISEASE No family hx of      Social History     Social History     Marital status:      Spouse name: N/A     Number of children: N/A     Years of education: N/A     Social History Main Topics     Smoking status: Former Smoker     Packs/day: 0.50     Years: 20.00     Types: Cigarettes     Quit date: 8/30/2015     Smokeless tobacco: Never Used      Comment: Quit smoking: has chantix     Alcohol use 0.6 oz/week      Comment: Alcoholic Drinks/day: rare     Drug use: None      Comment: Drug use: No     Sexual activity: Not Asked     Other Topics Concern     None     Social History Narrative    Unemployed       REVIEW OF SYSTEMS:  Review of Systems:  Skin: negative  Eyes: negative  Ears/Nose/Throat: negative  Respiratory: He has had a little bit of a cold lately with  "a cough.  Cardiovascular: negative  Gastrointestinal: negative  Genitourinary: negative  Musculoskeletal: negative  Neurologic: negative  Psychiatric: negative  Hematologic/Lymphatic/Immunologic: negative  Endocrine: See HPI      EXAM:   Vitals: reviewed in EMR.  /74 (BP Location: Right arm, Patient Position: Sitting, Cuff Size: Adult Regular)  Pulse 78  Resp 18  Ht 5' 8\" (1.727 m)  Wt 129 lb 6.4 oz (58.7 kg)  SpO2 100%  BMI 19.68 kg/m2    Gen: Pleasant male, NAD.  HEENT: MMM, no OP erythema.   Neck: Supple, no JVD, no bruits.  CV: RRR no m/r/g.   Pulm: CTAB no w/r/r  Neuro: Grossly intact  Msk: No lower extremity edema.  Skin: No concerning lesions.  Psychiatric: Normal affect and insight. Does not appear anxious or depressed.    Foot Exam:  3/12/2018  Intact tomonofilament bilaterally.  Skin intact without erythema.      DIAGNOSTICS:   Results for orders placed or performed in visit on 03/12/18   Basic metabolic panel  (Ca, Cl, CO2, Creat, Gluc, K, Na, BUN)   Result Value Ref Range    Sodium 138 134 - 144 mmol/L    Potassium 4.0 3.5 - 5.1 mmol/L    Chloride 105 98 - 107 mmol/L    Carbon Dioxide 28 21 - 31 mmol/L    Anion Gap 5 3 - 14 mmol/L    Glucose 167 (H) 70 - 105 mg/dL    Urea Nitrogen 13 7 - 25 mg/dL    Creatinine 0.99 0.70 - 1.30 mg/dL    GFR Estimate 81 >60 mL/min/1.7m2    GFR Estimate If Black >90 >60 mL/min/1.7m2    Calcium 8.9 8.6 - 10.3 mg/dL   Hemoglobin A1c   Result Value Ref Range    Hemoglobin A1C 7.1 (H) 4.0 - 6.0 %   CBC with platelets   Result Value Ref Range    WBC 9.0 4.0 - 11.0 10e9/L    RBC Count 4.35 (L) 4.4 - 5.9 10e12/L    Hemoglobin 13.1 (L) 13.3 - 17.7 g/dL    Hematocrit 39.1 (L) 40.0 - 53.0 %    MCV 90 78 - 100 fl    MCH 30.1 26.5 - 33.0 pg    MCHC 33.5 31.5 - 36.5 g/dL    RDW 13.0 10.0 - 15.0 %    Platelet Count 281 150 - 450 10e9/L   EKG 12-lead, tracing only    Narrative    EKG Interpretation:   Rhythm: Sinus  Rate: 68  Axis: leftward  Conduction: RSR pattern in lead " V1  QRS: Normal  ST Segments: Normal  T-wave: Normal  Chambers: Normal  PACs Present: No  PVCs Present: No    Impression:   Normal EKG.  Compared to January 22, 2014: Unchanged.    Signed, Adebayo Dubois MD  Internal Medicine & Pediatrics  3/12/2018  9:04 AM           IMPRESSION:     ICD-10-CM    1. Pre-op exam Z01.818 CBC with platelets     EKG 12-lead, tracing only   2. Non-recurrent bilateral inguinal hernia without obstruction or gangrene K40.20    3. Controlled type 2 diabetes mellitus without complication, without long-term current use of insulin (H) E11.9 Basic metabolic panel  (Ca, Cl, CO2, Creat, Gluc, K, Na, BUN)     Hemoglobin A1c     blood glucose monitoring (ONETOUCH VERIO IQ) test strip   4. History of tobacco use disorder Z87.891    5. Lipid screening Z13.220 **Lipid Profile (Chol, Trig, HDL, LDL calc) anytime - FASTING       For above listed surgery and anesthesia:   Patient is at increased risk forperioperative complications based on diabetes mellitus type 2.    RECOMMENDATIONS:   APPROVAL GIVEN to proceed with proposed procedure, without further diagnostic evaluation    Patient is on chronic pain medications: No  Patient is on antiplatelet/anticoagulation: Yes  Other medications that need adjustment perioperatively: Yes  Patient Instructions    -- No change to Trulicity   -- Check your sugar more frequently day of surgery as you could dip low without eating   -- Don't take meloxicam within a week before surgery   -- Hold aspirin, Advil/ibuprofen, Aleve/naproxen for 7 days before surgery   -- Acetaminophen (Tylenol) is okay   -- Hold vitamins and herbal remedies for 7 days before surgery        Signed, Adebayo Dubois MD  Internal Medicine & Pediatrics

## 2018-03-20 NOTE — OR NURSING
PACU Transfer Note    Rick Contreras was transferred to DSU via cart. Report to DSU nurse  Equipment used for transport:  none.  Accompanied by:  RN    PACU Respiratory Event Documentation     1) Episodes of Apnea greater than or equal to 10 seconds: no    2) Bradypnea - less than 8 breaths per minute: no    3) Pain score on 0 to 10 scale: no    4) Pain-sedation mismatch (yes or no):no    5) Repeated 02 desaturation less than 90% (yes or no): no    Anesthesia notified? (yes or no): no    Any of the above events occuring repeatedly in separate 30 minute intervals may be considered recurrent PACU respiratory events.    Patient stable and meets phase 1 discharge criteria for transport from PACU.

## 2018-03-20 NOTE — ANESTHESIA POSTPROCEDURE EVALUATION
Patient: Rick Contreras    Procedure(s):  Bilateral Inguinal Hernia Repair with Mesh - Wound Class: I-Clean    Diagnosis:bilateral inguinal hernia  Diagnosis Additional Information: No value filed.    Anesthesia Type:  General    Note:  Anesthesia Post Evaluation    Patient location during evaluation: PACU  Patient participation: Able to fully participate in evaluation  Level of consciousness: awake and alert  Pain management: adequate  Airway patency: patent  Cardiovascular status: acceptable  Respiratory status: acceptable  Hydration status: acceptable  PONV: none     Anesthetic complications: None          Last vitals:  Vitals:    03/20/18 1059 03/20/18 1100 03/20/18 1115   BP: (!) 122/95 121/89 116/85   Resp: 14 14 14   Temp: 97.3  F (36.3  C)     SpO2:  98% 98%         Electronically Signed By: KENNY HIGGINS CRNA  March 20, 2018  11:33 AM

## 2018-03-20 NOTE — IP AVS SNAPSHOT
[facility]    [address]    [place] [address]    Phone:  [phone]    Fax:  [phone]                                       After Visit Summary   3/20/2018    Rick Contreras    MRN: [id number]           After Visit Summary Signature Page     I have received my discharge instructions, and my questions have been answered. I have discussed any challenges I see with this plan with the nurse or doctor.    ..........................................................................................................................................  Patient/Patient Representative Signature      ..........................................................................................................................................  Patient Representative Print Name and Relationship to Patient    ..................................................               ................................................  Date                                            Time    ..........................................................................................................................................  Reviewed by Signature/Title    ...................................................              ..............................................  Date                                                            Time

## 2018-03-28 ENCOUNTER — OFFICE VISIT (OUTPATIENT)
Dept: SURGERY | Facility: OTHER | Age: 48
End: 2018-03-28
Attending: SURGERY
Payer: COMMERCIAL

## 2018-03-28 VITALS — SYSTOLIC BLOOD PRESSURE: 108 MMHG | HEART RATE: 72 BPM | DIASTOLIC BLOOD PRESSURE: 80 MMHG | HEIGHT: 68 IN

## 2018-03-28 DIAGNOSIS — Z98.890 POSTOPERATIVE STATE: Primary | ICD-10-CM

## 2018-03-28 PROBLEM — K40.20 NON-RECURRENT BILATERAL INGUINAL HERNIA: Status: RESOLVED | Noted: 2017-10-25 | Resolved: 2018-03-28

## 2018-03-28 PROCEDURE — 99024 POSTOP FOLLOW-UP VISIT: CPT | Performed by: SURGERY

## 2018-03-28 PROCEDURE — G0463 HOSPITAL OUTPT CLINIC VISIT: HCPCS

## 2018-03-28 ASSESSMENT — PAIN SCALES - GENERAL: PAINLEVEL: NO PAIN (1)

## 2018-03-28 NOTE — NURSING NOTE
Here today post-op hernia repair.  He is doing well.  Herlinda Bonilla LPN..........3/28/2018  9:37 AM

## 2018-03-28 NOTE — PROGRESS NOTES
"Subjective:  This is a follow up after bilateral inguinal hernia repair on 3/20/18. The patient has no complaints.     Objective:/80 (BP Location: Right arm, Patient Position: Sitting, Cuff Size: Adult Large)  Pulse 72  Ht 5' 8\" (1.727 m)   The incisions are healing well without erythema. No bulging.    Assessment:   Doing well after Bilateral inguinal hernia repairs    Plan:  Follow-up in one month and keep lifting under 20 pounds until then    "

## 2018-03-28 NOTE — MR AVS SNAPSHOT
"              After Visit Summary   3/28/2018    Rick Contreras    MRN: [id number]           Patient Information     Date Of Birth          1970        Visit Information        Provider Department      3/28/2018 9:40 AM Immanuel Calzada MD [facility]        Today's Diagnoses     Postoperative state    -  1       Follow-ups after your visit        Follow-up notes from your care team     Return in about 1 month (around 4/28/2018) for post-op.      Your next 10 appointments already scheduled     Apr 25, 2018 10:30 AM CDT   [facility] with Im[facility]ada [facility]   [facility]    [address]  [place] [address] [phone]              Who to contact     If you have questions or need follow up information about today's clinic visit or your schedule please contact [facility] directly at [phone].  Normal or non-critical lab and imaging results will be communicated to you by MyChart, letter or phone within 4 business days after the clinic has received the results. If you do not hear from us within 7 days, please contact the clinic through ClickMagichart or phone. If you have a critical or abnormal lab result, we will notify you by phone as soon as possible.  Submit refill requests through orderbolt or call your pharmacy and they will forward the refill request to us. Please allow 3 business days for your refill to be completed.          Additional Information About Your Visit        Care EveryWhere ID     This is your Care EveryWhere ID. This could be used by other organizations to access your [place] medical records  [id number]        Your Vitals Were     Pulse Height                72 5' 8\" (1.727 m)           Blood Pressure from Last 3 Encounters:   03/28/18 108/80   03/20/18 109/78   03/12/18 110/74    Weight from Last 3 Encounters:   03/12/18 129 lb 6.4 oz (58.7 kg)   03/09/18 128 lb (58.1 kg)   02/12/18 " 126 lb (57.2 kg)              Today, you had the following     No orders found for display       Primary Care Provider Office Phone # Fax #    Adebayo Aaron Dubois [phone] [address]  [place] [address]        Equal Access to Services     [facility] : Hadii aad ku hadshirleyo Soomaali, waaxda luqadaha, qaybta kaalmada adeegyada, waxangelica moise hayjazzyn jose hooverdominickkalyani lara . So [facility] [phone].    ATENCIÓN: Si habla español, tiene a shipley disposición servicios gratuitos de asistencia lingüística. Llame al [phone].    We comply with applicable federal civil rights laws and [place] laws. We do not discriminate on the basis of race, color, national origin, age, disability, sex, sexual orientation, or gender identity.            Thank you!     Thank you for choosing [facility]  for your care. Our goal is always to provide you with excellent care. Hearing back from our patients is one way we can continue to improve our services. Please take a few minutes to complete the written survey that you may receive in the mail after your visit with us. Thank you!             Your Updated Medication List - Protect others around you: Learn how to safely use, store and throw away your medicines at www.disposemymeds.org.          This list is accurate as of 3/28/18  9:45 AM.  Always use your most recent med list.                   Brand Name Dispense Instructions for use Diagnosis    acetaminophen 325 MG tablet    TYLENOL     Take 2 tablets (650 mg) by mouth 4 times daily    Non-recurrent bilateral inguinal hernia without obstruction or gangrene       aspirin EC 81 MG EC tablet      Take 81 mg by mouth daily with food        blood glucose monitoring test strip    ONETOUCH VERIO IQ    100 strip    Dispense item covered by pt ins. E11.65 NIDDM type II, controlled  - Test 2 times/day. Reason: diabetes    Controlled type 2 diabetes mellitus without complication, without  long-term current use of insulin (H)       dulaglutide 1.5 MG/0.5ML pen    TRULICITY    4 mL    Inject 1.5 mg Subcutaneous every 7 days    Uncontrolled type 2 diabetes mellitus without complication, without long-term current use of insulin (H)       ibuprofen 600 MG tablet    ADVIL/MOTRIN     Take 1 tablet (600 mg) by mouth 4 times daily    Non-recurrent bilateral inguinal hernia without obstruction or gangrene       insulin aspart 100 UNIT/ML injection    NovoLOG FLEXPEN    30 mL    Correction scale: 1 unit for 150-199, 2 units for 200-249, 3 units for 250-299, 4 units for > 300.    Uncontrolled type 2 diabetes mellitus without complication, without long-term current use of insulin (H)       Lancets 30G Misc      Dispense item covered by pt ins. E11.65 NIDDM type II, uncontrolled - Test 4 times/day. Reason: New diabetes        lisinopril 2.5 MG tablet    PRINIVIL/Zestril     Take 2.5 mg by mouth daily        meloxicam 15 MG tablet    MOBIC    90 tablet    TAKE ONE TABLET BY MOUTH ONCE DAILY    Displacement of lumbar intervertebral disc without myelopathy       oxyCODONE-acetaminophen 5-325 MG per tablet    PERCOCET    18 tablet    Take 1 tablet by mouth every 6 hours as needed for moderate to severe pain    Non-recurrent bilateral inguinal hernia without obstruction or gangrene       polyethylene glycol powder    MIRALAX/GLYCOLAX     Take 17 g by mouth 2 times daily        simvastatin 20 MG tablet    ZOCOR     Take 20 mg by mouth At Bedtime

## 2018-05-29 DIAGNOSIS — M51.26 DISPLACEMENT OF LUMBAR INTERVERTEBRAL DISC WITHOUT MYELOPATHY: ICD-10-CM

## 2018-05-31 RX ORDER — MELOXICAM 15 MG/1
TABLET ORAL
Qty: 90 TABLET | Refills: 0 | Status: SHIPPED | OUTPATIENT
Start: 2018-05-31 | End: 2018-08-28

## 2018-05-31 RX ORDER — DULAGLUTIDE 1.5 MG/.5ML
INJECTION, SOLUTION SUBCUTANEOUS
Qty: 2 ML | Refills: 3 | Status: SHIPPED | OUTPATIENT
Start: 2018-05-31 | End: 2018-09-28

## 2018-06-05 ENCOUNTER — TELEPHONE (OUTPATIENT)
Dept: INTERNAL MEDICINE | Facility: OTHER | Age: 48
End: 2018-06-05

## 2018-06-05 NOTE — TELEPHONE ENCOUNTER
Writer returned call to patient to discuss his concerns as noted in reason for call. Patient reports that his blood sugar was 388 today about an hour and a half ago. Sugar has been running around that even with his trulicity shot and not eating. Reports that blood sugars have been running between 300-370 for the past 3 weeks. Patient took 3 units of novolog when sugar was 388 and now blood sugar is 145. Patient is wondering what he should do. States he hasn't been taking any novolog for a long time since starting trulicity, but has been having these high blood sugars recently now.    Denies taking steriods, denies any other symptoms, no fever, no symptoms. Just has high blood sugars for the last 3 weeks. Notes that sugars stay down as well when he drinks captain cokes. Hasn't drank for 2 weeks and his sugars have been high since he quit drinking.    Writer suggested an evaluation in the office to discuss his insulin regimen, as well as his most recent blood sugars. Writer would like patient to be seen in the office tomorrow. Patient states understanding. However states he has no gas or money until Thursday, so cannot come in to the clinic until then. PCP with an opening at 1000 on Thursday. Patient would like that appointment.     Writer advised patient to be aware of highs and lows in the interim, to come to the ED with any blood sugar over 500 or less than 70, and to be aware of symptoms accompanied with highs and lows.     Patient states understanding. Will do as advised. Writer transferred patient to scheduling for appointment as noted above. Patient happy with plan of care.    Peter Rao RN on 6/5/2018 at 2:52 PM

## 2018-06-07 ENCOUNTER — OFFICE VISIT (OUTPATIENT)
Dept: PEDIATRICS | Facility: OTHER | Age: 48
End: 2018-06-07
Attending: INTERNAL MEDICINE
Payer: COMMERCIAL

## 2018-06-07 VITALS
WEIGHT: 127.4 LBS | DIASTOLIC BLOOD PRESSURE: 80 MMHG | BODY MASS INDEX: 19.37 KG/M2 | SYSTOLIC BLOOD PRESSURE: 110 MMHG | HEART RATE: 76 BPM

## 2018-06-07 DIAGNOSIS — E11.9 CONTROLLED TYPE 2 DIABETES MELLITUS WITHOUT COMPLICATION, WITH LONG-TERM CURRENT USE OF INSULIN (H): Primary | ICD-10-CM

## 2018-06-07 DIAGNOSIS — F17.200 TOBACCO USE DISORDER: ICD-10-CM

## 2018-06-07 DIAGNOSIS — Z79.4 CONTROLLED TYPE 2 DIABETES MELLITUS WITHOUT COMPLICATION, WITH LONG-TERM CURRENT USE OF INSULIN (H): Primary | ICD-10-CM

## 2018-06-07 PROCEDURE — G0463 HOSPITAL OUTPT CLINIC VISIT: HCPCS

## 2018-06-07 PROCEDURE — 99214 OFFICE O/P EST MOD 30 MIN: CPT | Performed by: INTERNAL MEDICINE

## 2018-06-07 RX ORDER — VARENICLINE TARTRATE 1 MG/1
1 TABLET, FILM COATED ORAL 2 TIMES DAILY
Qty: 56 TABLET | Refills: 2 | Status: SHIPPED | OUTPATIENT
Start: 2018-06-07 | End: 2018-10-01

## 2018-06-07 RX ORDER — GLIPIZIDE 10 MG/1
TABLET ORAL
COMMUNITY
Start: 2018-05-30 | End: 2018-10-01

## 2018-06-07 NOTE — MR AVS SNAPSHOT
After Visit Summary   6/7/2018    Rick Contreras    MRN: [id number]           Patient Information     Date Of Birth          1970        Visit Information        Provider Department      6/7/2018 10:00 AM Adebayo Dubois MD [facility]        Today's Diagnoses     Controlled type 2 diabetes mellitus without complication, with long-term current use of insulin (H)    -  1      Care Instructions     -- Start Levemir 3 units at bedtime   -- Continue Novolog correction, don't give doses any closer than 4 hours   -- Continue Trulicity   -- Check blood sugars (before meals, before bed), and record in a log book   -- Follow-up in 2 weeks          Follow-ups after your visit        Follow-up notes from your care team     Return in about 2 weeks (around 6/21/2018) for diabetes.      Who to contact     If you have questions or need follow up information about today's clinic visit or your schedule please contact [facility] directly at [phone].  Normal or non-critical lab and imaging results will be communicated to you by CrossMediahart, letter or phone within 4 business days after the clinic has received the results. If you do not hear from us within 7 days, please contact the clinic through RentMonitort or phone. If you have a critical or abnormal lab result, we will notify you by phone as soon as possible.  Submit refill requests through [employer] or call your pharmacy and they will forward the refill request to us. Please allow 3 business days for your refill to be completed.          Additional Information About Your Visit        Care EveryWhere ID     This is your Care EveryWhere ID. This could be used by other organizations to access your [place] medical records  [id number]        Your Vitals Were     Pulse BMI (Body Mass Index)                76 19.37 kg/m2           Blood Pressure from Last 3 Encounters:   06/07/18 110/80   03/28/18 108/80   03/20/18 109/78     Weight from Last 3 Encounters:   06/07/18 127 lb 6.4 oz (57.8 kg)   03/12/18 129 lb 6.4 oz (58.7 kg)   03/09/18 128 lb (58.1 kg)              Today, you had the following     No orders found for display         Today's Medication Changes          These changes are accurate as of 6/7/18 10:09 AM.  If you have any questions, ask your nurse or doctor.               Start taking these medicines.        Dose/Directions    insulin detemir 100 UNIT/ML injection   Commonly known as:  LEVEMIR FLEXPEN/FLEXTOUCH   Used for:  Controlled type 2 diabetes mellitus without complication, with long-term current use of insulin (H)   Started by:  Adebayo Dubois MD        Dose:  3 Units   Inject 3 Units Subcutaneous At Bedtime   Quantity:  30 mL   Refills:  1         These medicines have changed or have updated prescriptions.        Dose/Directions    blood glucose monitoring test strip   Commonly known as:  ONETOUCH VERIO IQ   This may have changed:  additional instructions   Used for:  Controlled type 2 diabetes mellitus without complication, with long-term current use of insulin (H)   Changed by:  Adebayo Dubois MD        Dispense item covered by pt ins. E11.65 NIDDM type II, controlled  - Test 4-6 times/day. Reason: labile diabetes   Quantity:  100 strip   Refills:  11            Where to get your medicines      These medications were sent to [facility] [address] [address]     Phone:  [phone]     blood glucose monitoring test strip    insulin aspart 100 UNIT/ML injection    insulin detemir 100 UNIT/ML injection                Primary Care Provider Office Phone # Fax #    Adebayo Dubois [phone] [address] [facility] [address]        Equal Access to Services     LE JOHN AH: Valentine Braxton, piter spivey, bebeto staples  lajordan felix. So [facility] [phone].    ATENCIÓN: Si habla nae, tiene a shipley disposición servicios gratuitos de asistencia lingüística. Julián al [phone].    We comply with applicable federal civil rights laws and [place] laws. We do not discriminate on the basis of race, color, national origin, age, disability, sex, sexual orientation, or gender identity.            Thank you!     Thank you for choosing [facility]  for your care. Our goal is always to provide you with excellent care. Hearing back from our patients is one way we can continue to improve our services. Please take a few minutes to complete the written survey that you may receive in the mail after your visit with us. Thank you!             Your Updated Medication List - Protect others around you: Learn how to safely use, store and throw away your medicines at www.disposemymeds.org.          This list is accurate as of 6/7/18 10:09 AM.  Always use your most recent med list.                   Brand Name Dispense Instructions for use Diagnosis    acetaminophen 325 MG tablet    TYLENOL     Take 2 tablets (650 mg) by mouth 4 times daily    Non-recurrent bilateral inguinal hernia without obstruction or gangrene       aspirin 81 MG EC tablet      Take 81 mg by mouth daily with food        blood glucose monitoring test strip    ONETOUCH VERIO IQ    100 strip    Dispense item covered by pt ins. E11.65 NIDDM type II, controlled  - Test 4-6 times/day. Reason: labile diabetes    Controlled type 2 diabetes mellitus without complication, with long-term current use of insulin (H)       glipiZIDE 10 MG tablet    GLUCOTROL      Controlled type 2 diabetes mellitus without complication, with long-term current use of insulin (H)       ibuprofen 600 MG tablet    ADVIL/MOTRIN     Take 1 tablet (600 mg) by mouth 4 times daily    Non-recurrent bilateral inguinal hernia without obstruction or gangrene       insulin aspart 100 UNIT/ML injection     NovoLOG FLEXPEN    30 mL    Correction scale: 1 unit for 150-199, 2 units for 200-249, 3 units for 250-299, 4 units for > 300.    Controlled type 2 diabetes mellitus without complication, with long-term current use of insulin (H)       insulin detemir 100 UNIT/ML injection    LEVEMIR FLEXPEN/FLEXTOUCH    30 mL    Inject 3 Units Subcutaneous At Bedtime    Controlled type 2 diabetes mellitus without complication, with long-term current use of insulin (H)       Lancets 30G Misc      Dispense item covered by pt ins. E11.65 NIDDM type II, uncontrolled - Test 4 times/day. Reason: New diabetes        lisinopril 2.5 MG tablet    PRINIVIL/Zestril     Take 2.5 mg by mouth daily        meloxicam 15 MG tablet    MOBIC    90 tablet    TAKE 1 TABLET BY MOUTH ONCE DAILY    Displacement of lumbar intervertebral disc without myelopathy       polyethylene glycol powder    MIRALAX/GLYCOLAX     Take 17 g by mouth 2 times daily        simvastatin 20 MG tablet    ZOCOR     Take 20 mg by mouth At Bedtime        TRULICITY 1.5 MG/0.5ML pen   Generic drug:  dulaglutide     2 mL    INJECT 1.5MG (ONE PEN) SUBCUTANEOUSLY ONCE A WEEK    Uncontrolled type 2 diabetes mellitus without complication, without long-term current use of insulin (H)

## 2018-06-07 NOTE — PATIENT INSTRUCTIONS
-- Start Levemir 3 units at bedtime   -- Continue Novolog correction, don't give doses any closer than 4 hours   -- Continue Trulicity   -- Check blood sugars (before meals, before bed), and record in a log book   -- Follow-up in 2 weeks

## 2018-06-07 NOTE — NURSING NOTE
Patient presents to clinic for elevated blood sugars.  Denice Camargo LPN ....................  6/7/2018   9:40 AM

## 2018-06-07 NOTE — PROGRESS NOTES
"Subjective  Rick Contreras is a 48 year old male who presents for diabetes follow-up.  Rising blood sugars.  History of diabetes mellitus type 2 and continues on true elicited T, NovoLog correction scale.  He is on aspirin for prophylaxis, lisinopril for kidney protection, simvastatin for prophylaxis.  He has noticed his blood sugars have been on the rise.  It was 288 this morning.  He stopped taking glipizide because he read it was bad for you in the sun.  He relayed the other day where it was 221 he took 2 units at 8:30 PM and by midnight his blood sugar was still 250 so he took another 3 units and 15 minutes later it dropped to 67.  He thinks the first injection was just under the surface of the skin and \"did not give me that burn.\"  The second injection he gave a little deeper.  He notices that when he drinks alcohol his blood sugar is lower.  Started smoking again.  He would like to restart Chantix.  He was able to stay off cigarettes for about 6 months.  He expresses a desire to quit smoking.    Allergies: reviewed in EMR  Medications: reviewed in EMR  Problem List/PMH:reviewed in EMR    Social Hx:  Social History   Substance Use Topics     Smoking status: Former Smoker     Packs/day: 0.50     Years: 20.00     Types: Cigarettes     Quit date: 8/30/2015     Smokeless tobacco: Never Used      Comment: Quit smoking: has chantix     Alcohol use 0.6 oz/week      Comment: Alcoholic Drinks/day: rare     Social History     Social History Narrative    Unemployed     I reviewed social history and made relevant updates today.    Family Hx:   Family History   Problem Relation Age of Onset     DIABETES Mother      Diabetes     DIABETES Father      Diabetes     CANCER Father      Cancer,Skin, throat     Anesthesia Reaction No family hx of      CLOTTING DISORDER No family hx of      CEREBROVASCULAR DISEASE No family hx of        Objective  Vitals: reviewed in EMR.  /80 (BP Location: Right arm)  Pulse 76  Wt 127 lb " 6.4 oz (57.8 kg)  BMI 19.37 kg/m2    Gen: Pleasant male, NAD.  HEENT: MMM  Neck: Supple  Pulm: Breathing easily  Neuro: Grossly intact  Skin: No concerning lesions.  Psychiatric: Normal affect and insight. Does not appear anxious or depressed.    Diabetes Labs  Hemoglobin A1C (%)   Date Value   03/12/2018 7.1 (H)     Creatinine (mg/dL)   Date Value   03/12/2018 0.99   10/19/2017 0.99   01/22/2014 1.02     Glucose (mg/dL)   Date Value   03/12/2018 167 (H)   03/09/2018 120 (H)   10/20/2017 231 (H)   10/19/2017 330 (H)   01/22/2014 107 (H)     Potassium (mmol/L)   Date Value   03/12/2018 4.0   10/19/2017 4.8   01/22/2014 4.4       Assessment    ICD-10-CM    1. Controlled type 2 diabetes mellitus without complication, with long-term current use of insulin (H) E11.9 glipiZIDE (GLUCOTROL) 10 MG tablet    Z79.4 blood glucose monitoring (ONETOUCH VERIO IQ) test strip     insulin detemir (LEVEMIR FLEXPEN/FLEXTOUCH) 100 UNIT/ML injection     insulin aspart (NOVOLOG FLEXPEN) 100 UNIT/ML injection   2. Tobacco use disorder F17.200 varenicline (CHANTIX STARTING MONTH PAK) 0.5 MG X 11 & 1 MG X 42 tablet     varenicline (CHANTIX) 1 MG tablet     He has very little body fat and I think the problem was that he is having a difficult time reaching the subcutaneous space and had stacking of insulin doses.  It appears that he is progressing to insulin-dependent diabetes mellitus.  For now we will add basal insulin and have low threshold to add mealtime.    Plan  Patient Instructions    -- Start Levemir 3 units at bedtime   -- Continue Novolog correction, don't give doses any closer than 4 hours   -- Continue Trulicity   -- Check blood sugars (before meals, before bed), and record in a log book   -- Follow-up in 2 weeks      Return in about 2 weeks (around 6/21/2018) for diabetes.    Signed, Adebayo Dubois MD  Internal Medicine & Pediatrics

## 2018-06-08 ASSESSMENT — PATIENT HEALTH QUESTIONNAIRE - PHQ9: SUM OF ALL RESPONSES TO PHQ QUESTIONS 1-9: 21

## 2018-06-14 DIAGNOSIS — E11.9 CONTROLLED TYPE 2 DIABETES MELLITUS WITHOUT COMPLICATION, WITH LONG-TERM CURRENT USE OF INSULIN (H): ICD-10-CM

## 2018-06-14 DIAGNOSIS — Z79.4 CONTROLLED TYPE 2 DIABETES MELLITUS WITHOUT COMPLICATION, WITH LONG-TERM CURRENT USE OF INSULIN (H): ICD-10-CM

## 2018-06-14 NOTE — TELEPHONE ENCOUNTER
Hi ,  Pharmacy is requesting a new order with MAX DAILY DOSE written in sig.  Please and thank you!  Antonina Hilliard RN.............................6/14/2018 4:02 PM

## 2018-07-23 NOTE — PROGRESS NOTES
Patient Information     Patient Name  Rick Contreras MRN  [id number] Sex  Male   1970      Letter by Kristen Rayo PA-C at      Author:  Kristen Rayo PA-C Service:  (none) Author Type:  (none)    Filed:   Encounter Date:  2017 Status:  (Other)           Rick Contreras   [address] [place] [id number]          2017    Dear Mr. Contreras:    A 90 day refill of Meloxicam 15 mg tablet has been called into your pharmacy.    Additional refills require an annual office visit with Jossue Colón MD.   Please call the clinic at [phone] to schedule your appointment.    If you should require additional refills before your scheduled appointment, please contact your pharmacy and we will refill your medication until that date.      Thank you,    The Refill Nurse  [facility]

## 2018-08-28 DIAGNOSIS — M51.26 DISPLACEMENT OF LUMBAR INTERVERTEBRAL DISC WITHOUT MYELOPATHY: ICD-10-CM

## 2018-08-30 RX ORDER — MELOXICAM 15 MG/1
TABLET ORAL
Qty: 30 TABLET | Refills: 0 | Status: SHIPPED | OUTPATIENT
Start: 2018-08-30 | End: 2019-05-31

## 2018-08-30 NOTE — TELEPHONE ENCOUNTER
"Refill request from [facility] for;  meloxicam (MOBIC) 15 MG tablet    LOV 6/7/2018 with Dr. Dubois  Patient instructed to f/u in 2 weeks  \"Plan  Patient Instructions    -- Start Levemir 3 units at bedtime   -- Continue Novolog correction, don't give doses any closer than 4 hours   -- Continue Trulicity   -- Check blood sugars (before meals, before bed), and record in a log book   -- Follow-up in 2 weeks\"    No appt noted thereafter    Noted future labs ordered    Contacted patient and he will call Tuesday to schedule f/u with lab appt.    Will refill for 30 days-pt aware.    Requested Prescriptions   Pending Prescriptions Disp Refills     meloxicam (MOBIC) 15 MG tablet [Pharmacy Med Name: MELOXICAM 15MG      TAB] 90 tablet 0     Sig: TAKE ONE TABLET BY MOUTH ONCE DAILY    NSAID Medications Failed    8/28/2018  5:30 AM       Failed - Normal ALT on file in past 12 months    No lab results found.         Failed - Normal AST on file in past 12 months    No lab results found.         Passed - Blood pressure under 140/90 in past 12 months    BP Readings from Last 3 Encounters:   06/07/18 110/80   03/28/18 108/80   03/20/18 109/78            Passed - Recent (12 mo) or future (30 days) visit within the authorizing provider's specialty    Patient had office visit in the last 12 months or has a visit in the next 30 days with authorizing provider or within the authorizing provider's specialty.  See \"Patient Info\" tab in inbasket, or \"Choose Columns\" in Meds & Orders section of the refill encounter.           Passed - Patient is age 6-64 years       Passed - Normal CBC on file in past 12 months    Recent Labs   Lab Test  03/12/18   0830  10/19/17   1051   WBC  9.0   --    [id number]   --   12.8*   RBC  4.35*   --    [id number]   --   5.03   HGB  13.1*  14.9   HCT  39.1*  43.7   PLT  281  333       For [facility] ONLY: HTPN727 = WBC, [id number] = RBC         Passed - Normal serum creatinine on file in past 12 months    Recent Labs   Lab Test "  03/12/18   0830   CR  0.99             Leslie Petty RN  ....................  8/30/2018   3:14 PM

## 2018-09-10 ENCOUNTER — TELEPHONE (OUTPATIENT)
Dept: PEDIATRICS | Facility: OTHER | Age: 48
End: 2018-09-10

## 2018-09-10 NOTE — TELEPHONE ENCOUNTER
Patient is looking for a work in Thursday (9/13) afternoon for a diabetic follow up.   He states that is the only time he will have a vehicle to get here.   Thank you.

## 2018-09-28 ENCOUNTER — TELEPHONE (OUTPATIENT)
Dept: PEDIATRICS | Facility: OTHER | Age: 48
End: 2018-09-28

## 2018-09-28 RX ORDER — DULAGLUTIDE 1.5 MG/.5ML
INJECTION, SOLUTION SUBCUTANEOUS
Qty: 1 ML | Refills: 0 | Status: SHIPPED | OUTPATIENT
Start: 2018-09-28 | End: 2018-09-28

## 2018-09-28 NOTE — TELEPHONE ENCOUNTER
This was sent in by Rebekah Mckay so I am sending it back to you for the change.  Norma J. Gosselin LPN......  9/28/2018   2:52 PM

## 2018-09-28 NOTE — TELEPHONE ENCOUNTER
Prescription approved per [facility] Refill Protocol.  Limited supply given. Patient overdue to see Dr. Dubois for diabetic follow-up on 10-1-18, additional refills to be addressed then. Thank you.  Rebekah Mckay RN on 9/28/2018 at 8:46 AM

## 2018-09-28 NOTE — TELEPHONE ENCOUNTER
Given box as requested by pharmacy. Patient has scheduled visit on 10-1-18. Overdue for diabetic followup. Rebekah Mckay RN on 9/28/2018 at 3:02 PM

## 2018-10-01 ENCOUNTER — MEDICAL CORRESPONDENCE (OUTPATIENT)
Dept: HEALTH INFORMATION MANAGEMENT | Facility: OTHER | Age: 48
End: 2018-10-01

## 2018-10-01 ENCOUNTER — OFFICE VISIT (OUTPATIENT)
Dept: PEDIATRICS | Facility: OTHER | Age: 48
End: 2018-10-01
Attending: INTERNAL MEDICINE
Payer: COMMERCIAL

## 2018-10-01 VITALS
BODY MASS INDEX: 19.92 KG/M2 | RESPIRATION RATE: 18 BRPM | HEART RATE: 80 BPM | DIASTOLIC BLOOD PRESSURE: 68 MMHG | SYSTOLIC BLOOD PRESSURE: 112 MMHG | WEIGHT: 131 LBS | TEMPERATURE: 97.9 F

## 2018-10-01 DIAGNOSIS — G47.00 INSOMNIA, UNSPECIFIED TYPE: ICD-10-CM

## 2018-10-01 DIAGNOSIS — Z23 NEED FOR VACCINATION: ICD-10-CM

## 2018-10-01 PROCEDURE — 90471 IMMUNIZATION ADMIN: CPT | Performed by: INTERNAL MEDICINE

## 2018-10-01 PROCEDURE — G0463 HOSPITAL OUTPT CLINIC VISIT: HCPCS

## 2018-10-01 PROCEDURE — 90686 IIV4 VACC NO PRSV 0.5 ML IM: CPT | Performed by: INTERNAL MEDICINE

## 2018-10-01 PROCEDURE — G0463 HOSPITAL OUTPT CLINIC VISIT: HCPCS | Mod: 25

## 2018-10-01 PROCEDURE — 99214 OFFICE O/P EST MOD 30 MIN: CPT | Performed by: INTERNAL MEDICINE

## 2018-10-01 RX ORDER — TRAZODONE HYDROCHLORIDE 50 MG/1
50 TABLET, FILM COATED ORAL
Qty: 30 TABLET | Refills: 1 | Status: SHIPPED | OUTPATIENT
Start: 2018-10-01 | End: 2019-11-18

## 2018-10-01 ASSESSMENT — PAIN SCALES - GENERAL: PAINLEVEL: NO PAIN (0)

## 2018-10-01 NOTE — NURSING NOTE
Previous A1C is at goal of <8  Lab Results   Component Value Date    A1C 7.1 03/12/2018     Urine microalbumin:creatine: none  Foot exam 3/12/2018  Eye exam Over a year    Tobacco User no  Patient is on a daily aspirin  Patient is on a Statin.  Blood pressure today of:     BP Readings from Last 1 Encounters:   06/07/18 110/80      is at the goal of <139/89 for diabetics.    Denton Munoz LPN on 10/1/2018 at 7:41 AM

## 2018-10-01 NOTE — PROGRESS NOTES
Subjective  Rick Contreras is a 48 year old male who presents for diabetes follow-up.  He has been having a difficult time controlling his blood sugars lately.  They have been 280-380 with occasional over 400.  He is up all night most nights.  He is usually snacking anytime he is awake.  Sometimes he does get some rest during the day.  He brings his home blood glucose log for my review, see scanned documents.  He increased Levemir on his own from 3 up to 12 units.  He continues on NovoLog correction scale.  He is also been taking Trulicity as prescribed.  He stopped glipizide because he was worried about sunburn.  He continues on aspirin for clot prophylaxis.  History of hyperlipidemia which is stable on simvastatin.  He continues on lisinopril for kidney protection with diabetes.  He has chronic osteoarthritis and continues on meloxicam as needed.    Allergies: reviewed in EMR  Medications: reviewed in EMR  Problem List/PMH:reviewed in EMR    Social Hx:  Social History   Substance Use Topics     Smoking status: Former Smoker     Packs/day: 0.50     Years: 20.00     Types: Cigarettes     Quit date: 8/30/2015     Smokeless tobacco: Never Used      Comment: Quit smoking: has chantix     Alcohol use 0.6 oz/week      Comment: Alcoholic Drinks/day: rare     Social History     Social History Narrative    Unemployed     I reviewed social history and made relevant updates today.    Family Hx:   Family History   Problem Relation Age of Onset     Diabetes Mother      Diabetes     Diabetes Father      Diabetes     Cancer Father      Cancer,Skin, throat     Anesthesia Reaction No family hx of      CLOTTING DISORDER No family hx of      Cerebrovascular Disease No family hx of        Objective  Vitals: reviewed in EMR.  /68 (BP Location: Right arm, Patient Position: Sitting, Cuff Size: Adult Regular)  Pulse 80  Temp 97.9  F (36.6  C) (Tympanic)  Resp 18  Wt 131 lb (59.4 kg)  BMI 19.92 kg/m2    Gen: Pleasant male,  NAD.  HEENT: MMM  Neck: Supple  Pulm: Breathing easily  Neuro: Grossly intact  Skin: No concerning lesions.  Psychiatric: Normal affect and insight. Does not appear anxious or depressed.    Diabetes Labs  Hemoglobin A1C (%)   Date Value   03/12/2018 7.1 (H)     Creatinine (mg/dL)   Date Value   03/12/2018 0.99   10/19/2017 0.99   01/22/2014 1.02     Glucose (mg/dL)   Date Value   03/12/2018 167 (H)   03/09/2018 120 (H)   10/20/2017 231 (H)   10/19/2017 330 (H)   01/22/2014 107 (H)     Potassium (mmol/L)   Date Value   03/12/2018 4.0   10/19/2017 4.8   01/22/2014 4.4       Assessment    ICD-10-CM    1. Uncontrolled type 2 diabetes mellitus without complication, with long-term current use of insulin (H) E11.65 insulin aspart (NOVOLOG FLEXPEN) 100 UNIT/ML injection    Z79.4 insulin detemir (LEVEMIR FLEXPEN/FLEXTOUCH) 100 UNIT/ML injection     blood glucose monitoring (NO BRAND SPECIFIED) meter device kit     blood glucose monitoring (NO BRAND SPECIFIED) test strip     blood glucose (NO BRAND SPECIFIED) lancets standard     insulin pen needle (B-D U/F) 31G X 8 MM   2. Insomnia, unspecified type G47.00 traZODone (DESYREL) 50 MG tablet   3. Need for vaccination Z23 GH IMM-  HC FLU VAC PRESRV FREE QUAD SPLIT VIR 3+YRS IM     He does appear to be developing a significant insulin resistance.  It does not appear that glipizide and/or Trulicity are helping that much at this point.  Ramping up Levemir and NovoLog is the most likely effective treatment here.  Metformin was not previously tolerated.  Close follow-up is recommended.  I think his insomnia significantly affecting his diabetes with regards to snacking all night long.  He is very slim but we could consider sleep apnea testing in the future.    Plan  Patient Instructions      -- Stop glipizide   -- Stop trulicity   -- Increase Levemir 12 to 15 units   -- Start taking Novolog 5 units with meals, + correction scale   -- Limit meals to 3 meals a day   -- Try to keep  snacks as zero carb snacks     -- Exercise daily   -- No naps   -- No caffeine after 3pm   -- No screens the hour before bed (eg TV, cell phone, tablets, E-readers, laptops, etc)   -- No TVs in bedroom   -- Keep the same bed time and wake time 7 days a week   -- Take trazodone 30-60 minutes before bedtime   -- Relaxing routine before bed   -- Lay down in bed when tired, and go to sleep    [place] counselors/therapists   Telephone Hours Kids? Address   [facility]  (Many counselors) [phone] M-Th 8-5  F 8-12 Yes [address] [place]   [email or web address]://[email or web address]   [facility]  (Many counselors) [phone]  Yes [address] [place]   [email or web address]://[email or web address].[email or web address]   [facility]  (Many counselors) [phone]  Yes [address]  [email or web address]://[email or web address]   [facility]  Mina Nina Howe [phone]  Yes [address]   [address]  [email or web address]   [facility]  Micha Valentino [phone]  Yes [address]   Miri Pate [phone]   [address]  [email or web address]   Louise Melendez [phone]   [address]   Christal Gonzalez [phone]   [address] [place] [address]   Leighann Pickard [phone]  Yes [address]   Teja العلي [phone]   [address]    Nam Mari [phone]   [address] [place] [address]   Arabella Leonardo [phone]   10   [address] [place] [address]   [email or web address]://[email or web address].[email or web address].[employer] [email or web address]   Denae Wright [phone]   [address] [place] [address] [address]  ([facility])  [email or web address]   [facility]  Carter Madera [phone]   [address] [facility]  Michele Rinne Cindy Thomas [phone]      [facility]  Eliezer Pickard [phone]  Yes [address]  [email or web address]   [facility]  Fahad Taylor, [facility] [phone] M-F 8-5 Yes [address] [facility],  [email or web address] [email or web address]   [facility]: Kw[place]wo [phone]  Yes KARISSA Burkett  [address]  [email or web address]://[email or web address]   [facility]: [place] [phone]  Yes KARISSA Ragland  [address] [facility]  [email or web address]             Return in about 1 month (around 11/1/2018) for diabetes.    Signed, Adebayo Dubois MD  Internal Medicine & Pediatrics

## 2018-10-01 NOTE — PATIENT INSTRUCTIONS
-- Stop glipizide   -- Stop trulicity   -- Increase Levemir 12 to 15 units   -- Start taking Novolog 5 units with meals, + correction scale   -- Limit meals to 3 meals a day   -- Try to keep snacks as zero carb snacks     -- Exercise daily   -- No naps   -- No caffeine after 3pm   -- No screens the hour before bed (eg TV, cell phone, tablets, E-readers, laptops, etc)   -- No TVs in bedroom   -- Keep the same bed time and wake time 7 days a week   -- Take trazodone 30-60 minutes before bedtime   -- Relaxing routine before bed   -- Lay down in bed when tired, and go to sleep    [place] counselors/therapists   Telephone Hours Kids? Address   [facility]  (Many counselors) [phone] M-Th 8-5  F 8-12 Yes [address] [place] [address]   [email or web address]://[email or web address].[facility]   [facility]  (Many counselors) [phone]  Yes [address] [facility] [address] [place]   [email or web address]://[email or web address].[facility].[email or web address]   [facility]  (Many counselors) [phone]  Yes [address] [place]  http://www.[email or web address]/   [facility]  Minahernandez Howe [phone]  Yes [address]   [address]  [email or web address]   [facility]  Micha Valentino [phone]  Yes [address]   Miri Pate [phone]   [address]  [email or web address]   Louise Melendez [phone]   [address]   Christal Gonzalez [phone]   [address] [place] [address]   Leighann Neeraj [phone]  Yes [address]   Teja العلي [phone]   [address]    Nam Kamaljit [phone]   [address] [place] [address]   Arabella Patterson [phone]   10   [address] [place] [address]   [email or web address]://[email or web address].[email or web address]   Denae Wright [phone]   [address] [place] [address] [address]  ([facility])  [email or web address]   [facility]  Carter Madera [phone]   [address] [place] [address]  Michele Rinne Cindy Thomas [phone]      [facility]  Eliezer  Neeraj [phone]  Yes [address]  [email or web address]   [facility]  Fahad Taylor CNP [phone] M-F 8-5 Yes [address] [place], [place]  [email or web address]   [facility] [place] [phone]  Yes KARISSA Burkett  [address]  http://www.[email or web address]/   [facility] [place] [phone]  Yes KARISSA Ragland  [address] [facility]  http://www.[email or web address]/

## 2018-10-01 NOTE — MR AVS SNAPSHOT
After Visit Summary   10/1/2018    Rick Contreras    MRN: [id number]           Patient Information     Date Of Birth          1970        Visit Information        Provider Department      10/1/2018 7:45 AM Adebayo Dubois MD [facility]        Today's Diagnoses     Uncontrolled type 2 diabetes mellitus without complication, with long-term current use of insulin (H)    -  1    Insomnia, unspecified type          Care Instructions     -- Stop glipizide   -- Stop trulicity   -- Increase Levemir 12 to 15 units   -- Start taking Novolog 5 units with meals, + correction scale   -- Limit meals to 3 meals a day   -- Try to keep snacks as zero carb snacks     -- Exercise daily   -- No naps   -- No caffeine after 3pm   -- No screens the hour before bed (eg TV, cell phone, tablets, E-readers, laptops, etc)   -- No TVs in bedroom   -- Keep the same bed time and wake time 7 days a week   -- Take trazodone 30-60 minutes before bedtime   -- Relaxing routine before bed   -- Lay down in bed when tired, and go to sleep    [place] counselors/therapists   Telephone Hours Kids? Address   [facility]  (Many counselors) [phone] M-Th 8-5  F 8-12 Yes [address] [place] [address]   [email or web address]://[email or web address]   [facility]  (Many counselors) [phone]  Yes [address] [facility] [address] [place]   http://www.[facility].[email or web address]   [facility]  (Many counselors) [phone]  Yes [address]  http://[email or web address].[email or web address]/   [facility]  Minahernandez Howe [phone]  Yes [address]   [address]  [email or web address]   [facility]  Micha Valentino [phone]  Yes [address]   Miri Pate [phone]   [address]  [email or web address]   Louise Melendez [phone]   [address] [facility] [address]   Christal Gonzalez [phone]   [address] [place] [address]   Leighann Neeraj [phone]  Yes [address] Teja العلي  [phone]   [address] [facility]   Nam Mari [phone]   [address] [facility] [address]   Arabella Patterson [phone]   10   [address]   [email or web address]://[email or web address].[email or web address]   Denae Wright [phone]   [address] [facility] [address]  ([facility])  [email or web address]   [facility]  Carter Cassy [phone]   [address] [facility]  Michele Rinne Cindy Thomas [phone]      [facility]  Eliezer Pickard [phone]  Yes [address] [place] [address], [address]  [email or web address]   [facility]  Fahad Taylor CNP [phone] M-F 8-5 Yes [address] [place], [place]  [email or web address]   [facility] [place] [phone]  Yes KARISSA Burkett  [address]  http://www.[email or web address]/   [facility]: [place] [phone]  Yes [place]  [address] [facility]  http://www.[email or web address]/                 Follow-ups after your visit        Follow-up notes from your care team     Return in about 1 month (around 11/1/2018) for diabetes.      Who to contact     If you have questions or need follow up information about today's clinic visit or your schedule please contact [facility] directly at [phone].  Normal or non-critical lab and imaging results will be communicated to you by MyChart, letter or phone within 4 business days after the clinic has received the results. If you do not hear from us within 7 days, please contact the clinic through MyChart or phone. If you have a critical or abnormal lab result, we will notify you by phone as soon as possible.  Submit refill requests through [employer] or call your pharmacy and they will forward the refill request to us. Please allow 3 business days for your refill to be completed.          Additional Information About Your Visit        Care EveryWhere ID     This is your Care EveryWhere ID. This could be used by  other organizations to access your [facility] medical records  [id number]        Your Vitals Were     Pulse Temperature Respirations BMI (Body Mass Index)          80 97.9  F (36.6  C) (Tympanic) 18 19.92 kg/m2         Blood Pressure from Last 3 Encounters:   10/01/18 112/68   06/07/18 110/80   03/28/18 108/80    Weight from Last 3 Encounters:   10/01/18 131 lb (59.4 kg)   06/07/18 127 lb 6.4 oz (57.8 kg)   03/12/18 129 lb 6.4 oz (58.7 kg)              Today, you had the following     No orders found for display         Today's Medication Changes          These changes are accurate as of 10/1/18  8:18 AM.  If you have any questions, ask your nurse or doctor.               Start taking these medicines.        Dose/Directions    blood glucose lancets standard   Commonly known as:  no brand specified   Used for:  Uncontrolled type 2 diabetes mellitus without complication, with long-term current use of insulin (H)   Started by:  Adebayo Dubois MD        Dispense item covered by insurance. Test blood sugar 4 times daily or as directed.   Quantity:  100 each   Refills:  99       blood glucose monitoring meter device kit   Commonly known as:  no brand specified   Used for:  Uncontrolled type 2 diabetes mellitus without complication, with long-term current use of insulin (H)   Started by:  Adebayo Dubois MD        Dispense option covered by insurance. Test blood sugar 4 times daily or as directed.   Quantity:  1 kit   Refills:  11       insulin pen needle 31G X 8 MM   Commonly known as:  B-D U/F   Used for:  Uncontrolled type 2 diabetes mellitus without complication, with long-term current use of insulin (H)   Started by:  Adebayo Dubois MD        Use 4 daily or as directed.   Quantity:  100 each   Refills:  prn       traZODone 50 MG tablet   Commonly known as:  DESYREL   Used for:  Insomnia, unspecified type   Started by:  Adebayo Dubois MD        Dose:  50 mg   Take 1 tablet (50 mg) by  mouth nightly as needed for sleep   Quantity:  30 tablet   Refills:  1         These medicines have changed or have updated prescriptions.        Dose/Directions    * blood glucose monitoring test strip   Commonly known as:  ONETOUCH VERIO IQ   This may have changed:  Another medication with the same name was added. Make sure you understand how and when to take each.   Used for:  Controlled type 2 diabetes mellitus without complication, with long-term current use of insulin (H)   Changed by:  Adebayo Dubois MD        Dispense item covered by pt ins. E11.65 NIDDM type II, controlled  - Test 4-6 times/day. Reason: labile diabetes   Quantity:  100 strip   Refills:  11       * blood glucose monitoring test strip   Commonly known as:  no brand specified   This may have changed:  You were already taking a medication with the same name, and this prescription was added. Make sure you understand how and when to take each.   Used for:  Uncontrolled type 2 diabetes mellitus without complication, with long-term current use of insulin (H)   Changed by:  Adebayo Dubois MD        Dispense item covered by insurance. Test blood sugar 4 times daily or as directed.   Quantity:  100 strip   Refills:  99       insulin aspart 100 UNIT/ML injection   Commonly known as:  NovoLOG FLEXPEN   This may have changed:    - how much to take  - how to take this  - when to take this  - additional instructions   Used for:  Uncontrolled type 2 diabetes mellitus without complication, with long-term current use of insulin (H)   Changed by:  Adebayo Dubois MD        Dose:  5 Units   Inject 5 Units Subcutaneous 3 times daily (with meals) +Correction: 1 unit for 150-199, 2 unit 200-249, 3 unit 250-299, 4 unit > 300.   Quantity:  30 mL   Refills:  11       * Notice:  This list has 2 medication(s) that are the same as other medications prescribed for you. Read the directions carefully, and ask your doctor or other care provider to  review them with you.      Stop taking these medicines if you haven't already. Please contact your care team if you have questions.     dulaglutide 1.5 MG/0.5ML pen   Commonly known as:  TRULICITY   Stopped by:  Adebayo Dubois MD           glipiZIDE 10 MG tablet   Commonly known as:  GLUCOTROL   Stopped by:  Adebayo Dubois MD                Where to get your medicines      These medications were sent to [facility] [address] [facility] [address] [address]     Phone:  [phone]     blood glucose lancets standard    blood glucose monitoring meter device kit    blood glucose monitoring test strip    insulin aspart 100 UNIT/ML injection    insulin detemir 100 UNIT/ML injection    insulin pen needle 31G X 8 MM    traZODone 50 MG tablet                Primary Care Provider Office Phone # Fax #    Adebayo Dubois [phone]       [address] [facility] [address]        Equal Access to Services     [facility] AH: Hadii aad ku hadasho Soomaali, waaxda luqadaha, qaybta kaalmada adeegyada, waxay idiin hayaan adeeg kharash la'jazzyn . So [facility] [phone].    ATENCIÓN: Si habla español, tiene a shipley disposición servicios gratuitos de asistencia lingüística. [facility] [phone].    We comply with applicable federal civil rights laws and [place] laws. We do not discriminate on the basis of race, color, national origin, age, disability, sex, sexual orientation, or gender identity.            Thank you!     Thank you for choosing [facility]  for your care. Our goal is always to provide you with excellent care. Hearing back from our patients is one way we can continue to improve our services. Please take a few minutes to complete the written survey that you may receive in the mail after your visit with us. Thank you!             Your Updated Medication List - Protect others around you: Learn how to  safely use, store and throw away your medicines at www.disposemymeds.org.          This list is accurate as of 10/1/18  8:18 AM.  Always use your most recent med list.                   Brand Name Dispense Instructions for use Diagnosis    aspirin 81 MG EC tablet      Take 81 mg by mouth daily with food        blood glucose lancets standard    no brand specified    100 each    Dispense item covered by insurance. Test blood sugar 4 times daily or as directed.    Uncontrolled type 2 diabetes mellitus without complication, with long-term current use of insulin (H)       blood glucose monitoring meter device kit    no brand specified    1 kit    Dispense option covered by insurance. Test blood sugar 4 times daily or as directed.    Uncontrolled type 2 diabetes mellitus without complication, with long-term current use of insulin (H)       * blood glucose monitoring test strip    ONETOUCH VERIO IQ    100 strip    Dispense item covered by pt ins. E11.65 NIDDM type II, controlled  - Test 4-6 times/day. Reason: labile diabetes    Controlled type 2 diabetes mellitus without complication, with long-term current use of insulin (H)       * blood glucose monitoring test strip    no brand specified    100 strip    Dispense item covered by insurance. Test blood sugar 4 times daily or as directed.    Uncontrolled type 2 diabetes mellitus without complication, with long-term current use of insulin (H)       insulin aspart 100 UNIT/ML injection    NovoLOG FLEXPEN    30 mL    Inject 5 Units Subcutaneous 3 times daily (with meals) +Correction: 1 unit for 150-199, 2 unit 200-249, 3 unit 250-299, 4 unit > 300.    Uncontrolled type 2 diabetes mellitus without complication, with long-term current use of insulin (H)       insulin detemir 100 UNIT/ML injection    LEVEMIR FLEXPEN/FLEXTOUCH    30 mL    Inject 3 Units Subcutaneous At Bedtime    Uncontrolled type 2 diabetes mellitus without complication, with long-term current use of insulin (H)        insulin pen needle 31G X 8 MM    B-D U/F    100 each    Use 4 daily or as directed.    Uncontrolled type 2 diabetes mellitus without complication, with long-term current use of insulin (H)       Lancets 30G Misc      Dispense item covered by pt ins. E11.65 NIDDM type II, uncontrolled - Test 4 times/day. Reason: New diabetes        lisinopril 2.5 MG tablet    PRINIVIL/Zestril     Take 2.5 mg by mouth daily        meloxicam 15 MG tablet    MOBIC    30 tablet    TAKE ONE TABLET BY MOUTH ONCE DAILY    Displacement of lumbar intervertebral disc without myelopathy       polyethylene glycol powder    MIRALAX/GLYCOLAX     Take 17 g by mouth 2 times daily        simvastatin 20 MG tablet    ZOCOR     Take 20 mg by mouth At Bedtime        traZODone 50 MG tablet    DESYREL    30 tablet    Take 1 tablet (50 mg) by mouth nightly as needed for sleep    Insomnia, unspecified type       * Notice:  This list has 2 medication(s) that are the same as other medications prescribed for you. Read the directions carefully, and ask your doctor or other care provider to review them with you.

## 2018-10-01 NOTE — NURSING NOTE
"Chief Complaint   Patient presents with     Diabetes     Follow up diabetic check       Initial /68 (BP Location: Right arm, Patient Position: Sitting, Cuff Size: Adult Regular)  Pulse 80  Temp 97.9  F (36.6  C) (Tympanic)  Resp 18  Wt 131 lb (59.4 kg)  BMI 19.92 kg/m2 Estimated body mass index is 19.92 kg/(m^2) as calculated from the following:    Height as of 3/28/18: 5' 8\" (1.727 m).    Weight as of this encounter: 131 lb (59.4 kg).  Medication Reconciliation: complete    Denton Munoz, KIRBY    "

## 2018-11-01 ENCOUNTER — OFFICE VISIT (OUTPATIENT)
Dept: PEDIATRICS | Facility: OTHER | Age: 48
End: 2018-11-01
Attending: INTERNAL MEDICINE
Payer: COMMERCIAL

## 2018-11-01 VITALS
DIASTOLIC BLOOD PRESSURE: 82 MMHG | BODY MASS INDEX: 20.98 KG/M2 | HEART RATE: 72 BPM | WEIGHT: 138 LBS | SYSTOLIC BLOOD PRESSURE: 120 MMHG

## 2018-11-01 DIAGNOSIS — Z79.4 CONTROLLED TYPE 2 DIABETES MELLITUS WITHOUT COMPLICATION, WITH LONG-TERM CURRENT USE OF INSULIN (H): Primary | ICD-10-CM

## 2018-11-01 DIAGNOSIS — E11.9 CONTROLLED TYPE 2 DIABETES MELLITUS WITHOUT COMPLICATION, WITH LONG-TERM CURRENT USE OF INSULIN (H): Primary | ICD-10-CM

## 2018-11-01 LAB
ANION GAP SERPL CALCULATED.3IONS-SCNC: 6 MMOL/L (ref 3–14)
BUN SERPL-MCNC: 20 MG/DL (ref 7–25)
CALCIUM SERPL-MCNC: 9.6 MG/DL (ref 8.6–10.3)
CHLORIDE SERPL-SCNC: 102 MMOL/L (ref 98–107)
CO2 SERPL-SCNC: 28 MMOL/L (ref 21–31)
CREAT SERPL-MCNC: 1.05 MG/DL (ref 0.7–1.3)
GFR SERPL CREATININE-BSD FRML MDRD: 75 ML/MIN/1.7M2
GLUCOSE SERPL-MCNC: 318 MG/DL (ref 70–105)
HBA1C MFR BLD: 9.3 % (ref 4–6)
POTASSIUM SERPL-SCNC: 4.6 MMOL/L (ref 3.5–5.1)
SODIUM SERPL-SCNC: 136 MMOL/L (ref 134–144)

## 2018-11-01 PROCEDURE — 99214 OFFICE O/P EST MOD 30 MIN: CPT | Performed by: INTERNAL MEDICINE

## 2018-11-01 PROCEDURE — 80048 BASIC METABOLIC PNL TOTAL CA: CPT | Performed by: INTERNAL MEDICINE

## 2018-11-01 PROCEDURE — G0463 HOSPITAL OUTPT CLINIC VISIT: HCPCS

## 2018-11-01 PROCEDURE — 83036 HEMOGLOBIN GLYCOSYLATED A1C: CPT | Performed by: INTERNAL MEDICINE

## 2018-11-01 PROCEDURE — 36415 COLL VENOUS BLD VENIPUNCTURE: CPT | Performed by: INTERNAL MEDICINE

## 2018-11-01 ASSESSMENT — ANXIETY QUESTIONNAIRES
7. FEELING AFRAID AS IF SOMETHING AWFUL MIGHT HAPPEN: NOT AT ALL
3. WORRYING TOO MUCH ABOUT DIFFERENT THINGS: NEARLY EVERY DAY
GAD7 TOTAL SCORE: 9
2. NOT BEING ABLE TO STOP OR CONTROL WORRYING: NOT AT ALL
1. FEELING NERVOUS, ANXIOUS, OR ON EDGE: NEARLY EVERY DAY
5. BEING SO RESTLESS THAT IT IS HARD TO SIT STILL: NOT AT ALL
IF YOU CHECKED OFF ANY PROBLEMS ON THIS QUESTIONNAIRE, HOW DIFFICULT HAVE THESE PROBLEMS MADE IT FOR YOU TO DO YOUR WORK, TAKE CARE OF THINGS AT HOME, OR GET ALONG WITH OTHER PEOPLE: SOMEWHAT DIFFICULT
6. BECOMING EASILY ANNOYED OR IRRITABLE: NEARLY EVERY DAY

## 2018-11-01 ASSESSMENT — PATIENT HEALTH QUESTIONNAIRE - PHQ9
SUM OF ALL RESPONSES TO PHQ QUESTIONS 1-9: 19
5. POOR APPETITE OR OVEREATING: NOT AT ALL

## 2018-11-01 ASSESSMENT — PAIN SCALES - GENERAL: PAINLEVEL: SEVERE PAIN (6)

## 2018-11-01 NOTE — LETTER
November 1, 2018      Rick Contreras  [address]   [place] [address]        Dear ,    We are writing to inform you of your test results.    A1c has really jumped up again.  Keep a written sugar log again, and come see me in a few weeks after you see the dietician.    Resulted Orders   Hemoglobin A1c   Result Value Ref Range    Hemoglobin A1C 9.3 (H) 4.0 - 6.0 %   Basic metabolic panel   Result Value Ref Range    Sodium 136 134 - 144 mmol/L    Potassium 4.6 3.5 - 5.1 mmol/L    Chloride 102 98 - 107 mmol/L    Carbon Dioxide 28 21 - 31 mmol/L    Anion Gap 6 3 - 14 mmol/L    Glucose 318 (H) 70 - 105 mg/dL    Urea Nitrogen 20 7 - 25 mg/dL    Creatinine 1.05 0.70 - 1.30 mg/dL    GFR Estimate 75 >60 mL/min/1.7m2    GFR Estimate If Black >90 >60 mL/min/1.7m2    Calcium 9.6 8.6 - 10.3 mg/dL       If you have any questions or concerns, please call the clinic at the number listed above.       Sincerely,        Adebayo Dubois MD

## 2018-11-01 NOTE — NURSING NOTE
"Patient presents to clinic for diabetic check.  Denice Camargo LPN ....................  11/1/2018   1:39 PM    Previous A1C is at goal of <8  Lab Results   Component Value Date    A1C 7.1 03/12/2018     Urine microalbumin:creatine: DUE  Foot exam 3/12/18  Eye exam 3/12/18    Tobacco User no  Patient is on a daily aspirin  Patient is on a Statin.  Blood pressure today of:     BP Readings from Last 1 Encounters:   11/01/18 120/82      is at the goal of <139/89 for diabetics.    Denice Camargo LPN on 11/1/2018 at 1:39 PM    Chief Complaint   Patient presents with     Diabetes       Initial /82 (BP Location: Right arm, Patient Position: Sitting, Cuff Size: Adult Regular)  Pulse 72  Wt 138 lb (62.6 kg)  BMI 20.98 kg/m2 Estimated body mass index is 20.98 kg/(m^2) as calculated from the following:    Height as of 3/28/18: 5' 8\" (1.727 m).    Weight as of this encounter: 138 lb (62.6 kg).  Medication Reconciliation: complete    Denice Camargo LPN      "

## 2018-11-01 NOTE — PATIENT INSTRUCTIONS
-- No change to insulin dose   -- Try to move your eating to 3 meals a day   -- Meet with the Dietician    -- Keep a food log for a week before you see the dietician   -- Follow-up in 3 months with sugar log book

## 2018-11-01 NOTE — PROGRESS NOTES
Subjective  Rick Contreras is a 48 year old male who presents for diabetes follow-up.  No blood sugar log is available for my review.  He is been having a couple lows in the 60s so he reduced his Levemir from 12 units nightly down to 8 units nightly.  He has had some highs in the 200s and occasionally into the 400 range.  This morning it was 132.  He is also taking NovoLog 5 units 3 times daily AC plus correction.  He tends to snack all day long.  He often eats foods like hot dogs, TV dinners, etc.  He has been able to get his sleep organized that he sleeping at night and awake during the day.  He continues on aspirin prophylaxis.  He is on lisinopril for kidney protection.  History of hyper lipidemia which is stable with simvastatin.    Allergies: reviewed in EMR  Medications: reviewed in EMR  Problem List/PMH:reviewed in EMR    Social Hx:  Social History   Substance Use Topics     Smoking status: Former Smoker     Packs/day: 0.50     Years: 20.00     Types: Cigarettes     Quit date: 8/30/2015     Smokeless tobacco: Never Used      Comment: Quit smoking: has chantix     Alcohol use 0.6 oz/week      Comment: Alcoholic Drinks/day: rare     Social History     Social History Narrative    Unemployed     I reviewed social history and made relevant updates today.    Family Hx:   Family History   Problem Relation Age of Onset     Diabetes Mother      Diabetes     Diabetes Father      Diabetes     Cancer Father      Cancer,Skin, throat     Anesthesia Reaction No family hx of      CLOTTING DISORDER No family hx of      Cerebrovascular Disease No family hx of        Objective  Vitals: reviewed in EMR.  /82 (BP Location: Right arm, Patient Position: Sitting, Cuff Size: Adult Regular)  Pulse 72  Wt 138 lb (62.6 kg)  BMI 20.98 kg/m2    Gen: Pleasant male, NAD.  HEENT: MMM  Neck: Supple  Pulm: Breathing easily  Neuro: Grossly intact  Skin: No concerning lesions.  Psychiatric: Normal affect and insight. Does not appear  anxious or depressed.    Diabetes Labs  Hemoglobin A1C (%)   Date Value   11/01/2018 9.3 (H)   03/12/2018 7.1 (H)     Creatinine (mg/dL)   Date Value   11/01/2018 1.05   03/12/2018 0.99   10/19/2017 0.99   01/22/2014 1.02     Glucose (mg/dL)   Date Value   11/01/2018 318 (H)   03/12/2018 167 (H)   03/09/2018 120 (H)   10/20/2017 231 (H)   10/19/2017 330 (H)   01/22/2014 107 (H)     Potassium (mmol/L)   Date Value   11/01/2018 4.6   03/12/2018 4.0   10/19/2017 4.8   01/22/2014 4.4       Assessment    ICD-10-CM    1. Controlled type 2 diabetes mellitus without complication, with long-term current use of insulin (H) E11.9 Hemoglobin A1c    Z79.4 Basic metabolic panel     Concerned that dietary indiscretion is his biggest modifiable risk factor.  He describes eating several foods that are not good choices with diabetes.  I did not feel comfortable making major changes to his insulin dosing today based on her reported pattern of both lows and highs.  My suspicion is that postprandial hyperglycemia is the biggest factor.  I requested that he keep a blood sugar log and follow back up in clinic, with timing to be determined by hemoglobin A1c and ongoing blood sugars.    Plan  Patient Instructions    -- No change to insulin dose   -- Try to move your eating to 3 meals a day   -- Meet with the Dietician    -- Keep a food log for a week before you see the dietician   -- Follow-up in 3 months with sugar log book        Signed, Adebayo Dubois MD  Internal Medicine & Pediatrics

## 2018-11-01 NOTE — MR AVS SNAPSHOT
After Visit Summary   11/1/2018    Rick Contreras    MRN: [id number]           Patient Information     Date Of Birth          1970        Visit Information        Provider Department      11/1/2018 2:00 PM Adebayo Dubois MD [facility]        Today's Diagnoses     Controlled type 2 diabetes mellitus without complication, with long-term current use of insulin (H)    -  1      Care Instructions     -- No change to insulin dose   -- Try to move your eating to 3 meals a day   -- Meet with the Dietician    -- Keep a food log for a week before you see the dietician   -- Follow-up in 3 months with sugar log book          Follow-ups after your visit        Additional Services     NUTRITION REFERRAL       Marianne                  Future tests that were ordered for you today     Open Future Orders        Priority Expected Expires Ordered    Hemoglobin A1c Routine  11/2/2019 11/1/2018    Basic metabolic panel Routine  11/2/2019 11/1/2018            Who to contact     If you have questions or need follow up information about today's clinic visit or your schedule please contact [facility] directly at [phone].  Normal or non-critical lab and imaging results will be communicated to you by MyChart, letter or phone within 4 business days after the clinic has received the results. If you do not hear from us within 7 days, please contact the clinic through MyChart or phone. If you have a critical or abnormal lab result, we will notify you by phone as soon as possible.  Submit refill requests through [employer] or call your pharmacy and they will forward the refill request to us. Please allow 3 business days for your refill to be completed.          Additional Information About Your Visit        Care EveryWhere ID     This is your Care EveryWhere ID. This could be used by other organizations to access your [facility] medical records  [id number]        Your Vitals  Were     Pulse BMI (Body Mass Index)                72 20.98 kg/m2           Blood Pressure from Last 3 Encounters:   11/01/18 120/82   10/01/18 112/68   06/07/18 110/80    Weight from Last 3 Encounters:   11/01/18 138 lb (62.6 kg)   10/01/18 131 lb (59.4 kg)   06/07/18 127 lb 6.4 oz (57.8 kg)              We Performed the Following     NUTRITION REFERRAL          Today's Medication Changes          These changes are accurate as of 11/1/18  2:14 PM.  If you have any questions, ask your nurse or doctor.               These medicines have changed or have updated prescriptions.        Dose/Directions    insulin detemir 100 UNIT/ML injection   Commonly known as:  LEVEMIR FLEXPEN/FLEXTOUCH   This may have changed:  how much to take   Changed by:  Adebayo Dubois MD        Dose:  8 Units   Inject 8 Units Subcutaneous At Bedtime   Quantity:  30 mL   Refills:  1            Where to get your medicines      These medications were sent to [facility] [address] [place] [id number]     Phone:  [phone]     insulin aspart 100 UNIT/ML injection    insulin detemir 100 UNIT/ML injection                Primary Care Provider Office Phone # Fax #    Adebayo Dubois [phone]       [address] [facility] [address]        Equal Access to Services     [facility] AH: Hadii aad ku hadasho Soomaali, waaxda luqadaha, qaybta kaalmada adeivonneyada, bebeto felix. So [facility] [phone].    ATENCIÓN: Si habla español, tiene a shipley disposición servicios gratuitos de asistencia lingüística. Julián al [phone].    We comply with applicable federal civil rights laws and [place] laws. We do not discriminate on the basis of race, color, national origin, age, disability, sex, sexual orientation, or gender identity.            Thank you!     Thank you for choosing [facility] AND [facility]  for your  care. Our goal is always to provide you with excellent care. Hearing back from our patients is one way we can continue to improve our services. Please take a few minutes to complete the written survey that you may receive in the mail after your visit with us. Thank you!             Your Updated Medication List - Protect others around you: Learn how to safely use, store and throw away your medicines at www.disposemymeds.org.          This list is accurate as of 11/1/18  2:14 PM.  Always use your most recent med list.                   Brand Name Dispense Instructions for use Diagnosis    aspirin 81 MG EC tablet      Take 81 mg by mouth daily with food        blood glucose lancets standard    no brand specified    100 each    Dispense item covered by insurance. Test blood sugar 4 times daily or as directed.    Uncontrolled type 2 diabetes mellitus without complication, with long-term current use of insulin (H)       blood glucose monitoring meter device kit    no brand specified    1 kit    Dispense option covered by insurance. Test blood sugar 4 times daily or as directed.    Uncontrolled type 2 diabetes mellitus without complication, with long-term current use of insulin (H)       * blood glucose monitoring test strip    ONETOUCH VERIO IQ    100 strip    Dispense item covered by pt ins. E11.65 NIDDM type II, controlled  - Test 4-6 times/day. Reason: labile diabetes    Controlled type 2 diabetes mellitus without complication, with long-term current use of insulin (H)       * blood glucose monitoring test strip    no brand specified    100 strip    Dispense item covered by insurance. Test blood sugar 4 times daily or as directed.    Uncontrolled type 2 diabetes mellitus without complication, with long-term current use of insulin (H)       insulin aspart 100 UNIT/ML injection    NovoLOG FLEXPEN    30 mL    Inject 5 Units Subcutaneous 3 times daily (with meals) +Correction: 1 unit for 150-199, 2 unit 200-249, 3 unit  250-299, 4 unit > 300.        insulin detemir 100 UNIT/ML injection    LEVEMIR FLEXPEN/FLEXTOUCH    30 mL    Inject 8 Units Subcutaneous At Bedtime        insulin pen needle 31G X 8 MM    B-D U/F    100 each    Use 4 daily or as directed.    Uncontrolled type 2 diabetes mellitus without complication, with long-term current use of insulin (H)       Lancets 30G Misc      Dispense item covered by pt ins. E11.65 NIDDM type II, uncontrolled - Test 4 times/day. Reason: New diabetes        lisinopril 2.5 MG tablet    PRINIVIL/Zestril     Take 2.5 mg by mouth daily        meloxicam 15 MG tablet    MOBIC    30 tablet    TAKE ONE TABLET BY MOUTH ONCE DAILY    Displacement of lumbar intervertebral disc without myelopathy       polyethylene glycol powder    MIRALAX/GLYCOLAX     Take 17 g by mouth 2 times daily        simvastatin 20 MG tablet    ZOCOR     Take 20 mg by mouth At Bedtime        traZODone 50 MG tablet    DESYREL    30 tablet    Take 1 tablet (50 mg) by mouth nightly as needed for sleep    Insomnia, unspecified type       * Notice:  This list has 2 medication(s) that are the same as other medications prescribed for you. Read the directions carefully, and ask your doctor or other care provider to review them with you.

## 2018-11-02 ASSESSMENT — ANXIETY QUESTIONNAIRES: GAD7 TOTAL SCORE: 9

## 2018-12-14 ENCOUNTER — OFFICE VISIT (OUTPATIENT)
Dept: FAMILY MEDICINE | Facility: OTHER | Age: 48
End: 2018-12-14
Attending: DIETITIAN, REGISTERED
Payer: COMMERCIAL

## 2018-12-14 VITALS — HEIGHT: 68 IN | WEIGHT: 140 LBS | BODY MASS INDEX: 21.22 KG/M2

## 2018-12-14 PROCEDURE — 97802 MEDICAL NUTRITION INDIV IN: CPT | Performed by: DIETITIAN, REGISTERED

## 2018-12-14 ASSESSMENT — MIFFLIN-ST. JEOR: SCORE: 1479.54

## 2018-12-14 NOTE — PROGRESS NOTES
"[facility]  Medical Nutrition Therapy    Visit Type: Initial Assessment    Rick Contreras referred by Dr. Dubois for MNT related to Type 2 DM, uncontrolled    Rick reports very high glucose results 220-350 most of the time. He did not bring his meter today or log book.  He reports that he has trouble sleeping and significant back pain.  We go through his typical day and insulin/medication routine.  He reports being hungry all of the time.  He said that counting CHO is very overwhelming to him.    Nutrition Assessment:  Anthropometrics  Height: .  172.7 cm (5' 8\") BMI:    21.33  Weight:  63.5 kg (140 lb) :  1.75  IBW (kg):   Male: 69.2        Nutrition History   Sleeps 5p-2am    Eats within 1-2 hours of waking: today he had venison and tator tots (\"A huge portion\"  Snacks all day \"hungry all day\" on bologna, chips, bread, pot pies  Meals: step mom \"is a good cook\" and makes the main meal daily (evening)  He says he tries to avoid sugar and fruit  Beverages: drinks 50+ oz of SF Yeison-katherine, diet soda, milk and water all day    Physical Activity  Low. Due to back pain and winter time.         Nutrition Prescription  Energy: 1800         Protein: 80-90 grams            Fluid: 85 oz        Fat: 65 grams          Carbohydrate: 30 grams per meal            Fiber: 35 grams      Nutrition Diagnosis:   Worsening DM2 control,  A1C 9.4%    Nutrition Intervention:   We worked on simple meal planning and food lists.  Using the MyPlate, we circled items he can have between his 3 meals (fat and protein and veggies only) and looked at portions at meals using MyPlate for DM.  He verbalized understanding and he thinks he can do this.    We also discovered that he may be taking his insulin at the wrong times, mostly taking it after eating, \"chasing his sugars\".  He really does not have a schedule.  We set a very specific plan and schedule to try for two weeks.    Wake Up  Test Glucose  Take Insulin (sliding scale + 3 " units for food)  Eat  Test Glucose two hours after eating    Repeat 3 x per day    Eat only non-CHO items between these three meals.    Nutrition Follow Up / Monitoring:  He agreed to call me in one week with glucose results       Patient to follow-up with RD in 1 week.  Patient has RD contact information to call/email if needed.    Time spent: 60 min  KRISTIN K. KLINEFELTER, RD on 12/14/2018 at 10:28 AM                          ROS      Physical Exam

## 2018-12-20 ENCOUNTER — TRANSFERRED RECORDS (OUTPATIENT)
Dept: HEALTH INFORMATION MANAGEMENT | Facility: OTHER | Age: 48
End: 2018-12-20

## 2018-12-21 ENCOUNTER — TELEPHONE (OUTPATIENT)
Dept: NUTRITION | Facility: OTHER | Age: 48
End: 2018-12-21

## 2019-05-16 ENCOUNTER — TELEPHONE (OUTPATIENT)
Dept: PEDIATRICS | Facility: OTHER | Age: 49
End: 2019-05-16

## 2019-05-16 DIAGNOSIS — Z79.4 CONTROLLED TYPE 2 DIABETES MELLITUS WITHOUT COMPLICATION, WITH LONG-TERM CURRENT USE OF INSULIN (H): Primary | ICD-10-CM

## 2019-05-16 DIAGNOSIS — E11.9 CONTROLLED TYPE 2 DIABETES MELLITUS WITHOUT COMPLICATION, WITH LONG-TERM CURRENT USE OF INSULIN (H): Primary | ICD-10-CM

## 2019-05-16 NOTE — TELEPHONE ENCOUNTER
Panel Management Review      Patient has the following on his problem list:     Depression / Dysthymia review    Measure:  Needs PHQ-9 score of 4 or less during index window.  Administer PHQ-9 and if score is 5 or more, send encounter to provider for next steps.    5 - 7 month window range:     PHQ-9 SCORE 12/28/2017 6/7/2018 11/1/2018   PHQ-9 Total Score 17 21 19       If PHQ-9 recheck is 5 or more, route to provider for next steps.    Patient is due for:  PHQ9    Diabetes    ASA: Passed    Last A1C  Lab Results   Component Value Date    A1C 9.3 11/01/2018    A1C 7.1 03/12/2018     A1C tested: FAILED    Last LDL:    No results found for: CHOL  No results found for: HDL  No results found for: LDL  No results found for: TRIG  No results found for: CHOLHDLRATIO  No results found for: NHDL    Is the patient on a Statin? YES             Is the patient on Aspirin? YES    Medications     HMG CoA Reductase Inhibitors     simvastatin (ZOCOR) 20 MG tablet       Salicylates     aspirin EC 81 MG EC tablet             Last three blood pressure readings:  BP Readings from Last 3 Encounters:   11/01/18 120/82   10/01/18 112/68   06/07/18 110/80       Date of last diabetes office visit: 11/2018     Tobacco History:     History   Smoking Status     Former Smoker     Packs/day: 0.50     Years: 20.00     Types: Cigarettes     Quit date: 8/30/2015   Smokeless Tobacco     Never Used     Comment: Quit smoking: has chantix           Composite cancer screening  Chart review shows that this patient is due/due soon for the following None  Summary:    Patient is due/failing the following:   A1C, BP CHECK, LDL and PHQ9    Action needed:   Routed to provider for review.    Type of outreach:    None, routed to provider for review.    Questions for provider review:  Pt due for Fasting labs and a1c                                                                                                                                      Denice  KIRBY Camargo ....................  5/16/2019   4:13 PM       Chart routed to Provider .

## 2019-05-21 NOTE — TELEPHONE ENCOUNTER
Please call Mr. Contreras and ask him to:   -- Schedule lab-only visit for fasting labs   -- Schedule diabetes office visit. Bring written blood sugar log book to the visit.    Signed, Adebayo Dubois MD  Internal Medicine & Pediatrics

## 2019-05-22 NOTE — TELEPHONE ENCOUNTER
Left message to call back....................  5/22/2019   10:15 AM  Phyllis Hill LPN 5/22/2019   10:15 AM

## 2019-05-22 NOTE — TELEPHONE ENCOUNTER
After patient's name and date of birth verified the below information was given.  Transferred patient to the appointment line to assist in scheduling an appointment.    Phyllis Hill ---- 5/22/2019 4:17 PM

## 2019-05-31 ENCOUNTER — OFFICE VISIT (OUTPATIENT)
Dept: PEDIATRICS | Facility: OTHER | Age: 49
End: 2019-05-31
Attending: INTERNAL MEDICINE
Payer: COMMERCIAL

## 2019-05-31 ENCOUNTER — TELEPHONE (OUTPATIENT)
Dept: PEDIATRICS | Facility: OTHER | Age: 49
End: 2019-05-31

## 2019-05-31 VITALS
DIASTOLIC BLOOD PRESSURE: 70 MMHG | WEIGHT: 139.3 LBS | RESPIRATION RATE: 18 BRPM | HEIGHT: 68 IN | SYSTOLIC BLOOD PRESSURE: 116 MMHG | BODY MASS INDEX: 21.11 KG/M2 | HEART RATE: 70 BPM | TEMPERATURE: 97 F

## 2019-05-31 DIAGNOSIS — M54.9 CHRONIC BACK PAIN, UNSPECIFIED BACK LOCATION, UNSPECIFIED BACK PAIN LATERALITY: ICD-10-CM

## 2019-05-31 DIAGNOSIS — M51.26 DISPLACEMENT OF LUMBAR INTERVERTEBRAL DISC WITHOUT MYELOPATHY: ICD-10-CM

## 2019-05-31 DIAGNOSIS — Z79.4 CONTROLLED TYPE 2 DIABETES MELLITUS WITHOUT COMPLICATION, WITH LONG-TERM CURRENT USE OF INSULIN (H): ICD-10-CM

## 2019-05-31 DIAGNOSIS — G89.29 CHRONIC BACK PAIN, UNSPECIFIED BACK LOCATION, UNSPECIFIED BACK PAIN LATERALITY: ICD-10-CM

## 2019-05-31 DIAGNOSIS — E11.9 CONTROLLED TYPE 2 DIABETES MELLITUS WITHOUT COMPLICATION, WITH LONG-TERM CURRENT USE OF INSULIN (H): ICD-10-CM

## 2019-05-31 PROBLEM — Z98.890 POSTOPERATIVE STATE: Status: RESOLVED | Noted: 2018-03-28 | Resolved: 2019-05-31

## 2019-05-31 LAB
ANION GAP SERPL CALCULATED.3IONS-SCNC: 6 MMOL/L (ref 3–14)
BUN SERPL-MCNC: 16 MG/DL (ref 7–25)
CALCIUM SERPL-MCNC: 9.5 MG/DL (ref 8.6–10.3)
CHLORIDE SERPL-SCNC: 104 MMOL/L (ref 98–107)
CHOLEST SERPL-MCNC: 171 MG/DL
CO2 SERPL-SCNC: 28 MMOL/L (ref 21–31)
CREAT SERPL-MCNC: 1.1 MG/DL (ref 0.7–1.3)
GFR SERPL CREATININE-BSD FRML MDRD: 71 ML/MIN/{1.73_M2}
GLUCOSE SERPL-MCNC: 169 MG/DL (ref 70–105)
HBA1C MFR BLD: 10.3 % (ref 4–6)
HDLC SERPL-MCNC: 49 MG/DL (ref 23–92)
LDLC SERPL CALC-MCNC: 107 MG/DL
NONHDLC SERPL-MCNC: 122 MG/DL
POTASSIUM SERPL-SCNC: 4.1 MMOL/L (ref 3.5–5.1)
SODIUM SERPL-SCNC: 138 MMOL/L (ref 134–144)
TRIGL SERPL-MCNC: 76 MG/DL

## 2019-05-31 PROCEDURE — 80048 BASIC METABOLIC PNL TOTAL CA: CPT | Performed by: INTERNAL MEDICINE

## 2019-05-31 PROCEDURE — 36415 COLL VENOUS BLD VENIPUNCTURE: CPT | Performed by: INTERNAL MEDICINE

## 2019-05-31 PROCEDURE — G0463 HOSPITAL OUTPT CLINIC VISIT: HCPCS

## 2019-05-31 PROCEDURE — 99213 OFFICE O/P EST LOW 20 MIN: CPT | Performed by: INTERNAL MEDICINE

## 2019-05-31 PROCEDURE — 80061 LIPID PANEL: CPT | Performed by: INTERNAL MEDICINE

## 2019-05-31 PROCEDURE — 83036 HEMOGLOBIN GLYCOSYLATED A1C: CPT | Performed by: INTERNAL MEDICINE

## 2019-05-31 RX ORDER — MELOXICAM 7.5 MG/1
7.5-15 TABLET ORAL
Qty: 60 TABLET | Refills: 3 | Status: SHIPPED | OUTPATIENT
Start: 2019-05-31 | End: 2020-02-13

## 2019-05-31 ASSESSMENT — ANXIETY QUESTIONNAIRES
GAD7 TOTAL SCORE: 9
2. NOT BEING ABLE TO STOP OR CONTROL WORRYING: NOT AT ALL
3. WORRYING TOO MUCH ABOUT DIFFERENT THINGS: NEARLY EVERY DAY
1. FEELING NERVOUS, ANXIOUS, OR ON EDGE: NEARLY EVERY DAY
6. BECOMING EASILY ANNOYED OR IRRITABLE: NOT AT ALL
IF YOU CHECKED OFF ANY PROBLEMS ON THIS QUESTIONNAIRE, HOW DIFFICULT HAVE THESE PROBLEMS MADE IT FOR YOU TO DO YOUR WORK, TAKE CARE OF THINGS AT HOME, OR GET ALONG WITH OTHER PEOPLE: NOT DIFFICULT AT ALL
5. BEING SO RESTLESS THAT IT IS HARD TO SIT STILL: NOT AT ALL
7. FEELING AFRAID AS IF SOMETHING AWFUL MIGHT HAPPEN: NEARLY EVERY DAY

## 2019-05-31 ASSESSMENT — PAIN SCALES - GENERAL: PAINLEVEL: MILD PAIN (3)

## 2019-05-31 ASSESSMENT — MIFFLIN-ST. JEOR: SCORE: 1471.36

## 2019-05-31 ASSESSMENT — PATIENT HEALTH QUESTIONNAIRE - PHQ9
5. POOR APPETITE OR OVEREATING: NOT AT ALL
SUM OF ALL RESPONSES TO PHQ QUESTIONS 1-9: 19

## 2019-05-31 NOTE — PATIENT INSTRUCTIONS
Aspects of Diabetes we can improve:  Hemoglobin A1c Lab Results   Component Value Date    A1C 10.3 05/31/2019    A1C 9.3 11/01/2018    A1C 7.1 03/12/2018    Goal range is under 8. Best is 6.5 to 7   Blood Pressure 116/70 Goal to keep less than 140/90   Tobacco  reports that he quit smoking about 3 years ago. His smoking use included cigarettes. He has a 10.00 pack-year smoking history. He has never used smokeless tobacco. Goal to abstain from tobacco   Aspirin yes Aspirin reduces risk of heart disease and stroke   ACE/ARB lisinopril These medications reduce risk of kidney disease   Cholesterol simvastatin Statins reduce risk of heart disease and stroke   Eye Exam annual Annual diabetic eye exam   Healthy weight Body mass index is 21.18 kg/m . Goal BMI under 30, best is under 25.      -- I'm trying to exercise daily (goal at least 20 min/day) with moderate aerobic activity   -- Eat healthy (resources from ADA at http://www.diabetes.org/)   -- I'm taking good care of my feet. Consider seeing the Podiatrist   -- Check blood sugars as directed, record in log book and bring to every appointment   -- Check your sugars before all meals, and 2 hours after supper   -- Goal sugar before breakfast: under 140   -- Goal sugar 2 hours after supper: under 170   -- Increase Levemir to 18 units at bedtime   -- Increase Novolog to 10 units before meals   -- Next diabetes lab draw: 3 months   -- Next diabetes office visit: 2 weeks

## 2019-05-31 NOTE — NURSING NOTE
Patient presents to clinic for diabetic check today.  Denice Camargo LPN ....................  5/31/2019   8:55 AM      No LMP for male patient.  Medication Reconciliation: complete    Denice Camargo LPN  5/31/2019 8:56 AM      Previous A1C is not at goal of <8  Lab Results   Component Value Date    A1C 10.3 05/31/2019    A1C 9.3 11/01/2018    A1C 7.1 03/12/2018     Urine microalbumin:creatine: DUE  Foot exam DUE  Eye exam 12/20/18    Tobacco User no  Patient is on a daily aspirin  Patient is on a Statin.  Blood pressure today of:     BP Readings from Last 1 Encounters:   05/31/19 116/70      is at the goal of <139/89 for diabetics.    Denice Camargo LPN on 5/31/2019 at 8:56 AM

## 2019-05-31 NOTE — TELEPHONE ENCOUNTER
"[facility] fax requesting sig clarification:    \"Medicare has tightened up their guidelines.  Must have specific number of time per day they are testing.  Must be numerical and can not have wording and/or as directed on it or and as needed\"    Noted test strips and lancets sent today:    blood glucose (NO BRAND SPECIFIED) lancets standard 400 each 99 5/31/2019  No   Sig: Dispense item covered by insurance. Test blood sugar 4 times daily or as directed.   Sent to pharmacy as: blood glucose (NO BRAND SPECIFIED) lancets standard   Class: E-Prescribe   Order: [id number]   E-Prescribing Status: Receipt confirmed by pharmacy (5/31/2019  9:19 AM CDT)     blood glucose (NO BRAND SPECIFIED) test strip 400 strip 99 5/31/2019  No   Sig: Dispense item covered by insurance. Test blood sugar 4 times daily or as directed.   Sent to pharmacy as: blood glucose (NO BRAND SPECIFIED) test strip   Class: E-Prescribe   Order: [id number]   E-Prescribing Status: Receipt confirmed by pharmacy (5/31/2019  9:19 AM CDT)     Resent without \"or as directed\"    Leslie Petty RN  ....................  5/31/2019   1:20 PM        "

## 2019-05-31 NOTE — PROGRESS NOTES
"Subjective  Rick Contreras is a 49 year old male who presents for diabetes follow-up.  After our last visit he met with Marianne the dietitian.  He decided to try to make a few lifestyle modifications.  He is been trying to eat more healthy.  He was taking his insulin after his meal.  After speaking with the dietitian he started taking half of his insulin before the meal, the other half after the meal he is been taking Levemir 12 units nightly, NovoLog 7 units 3 times daily AC plus correction.  Home blood sugars have ranged from 150-220.    Allergies: reviewed in EMR  Medications: reviewed in EMR  Problem List/PMH:reviewed in EMR    Social Hx:  Social History     Tobacco Use     Smoking status: Former Smoker     Packs/day: 0.50     Years: 20.00     Pack years: 10.00     Types: Cigarettes     Last attempt to quit: 8/30/2015     Years since quitting: 3.7     Smokeless tobacco: Never Used     Tobacco comment: Quit smoking: has chantix   Substance Use Topics     Alcohol use: Yes     Alcohol/week: 0.6 oz     Comment: Alcoholic Drinks/day: rare     Drug use: No     Social History     Social History Narrative    Unemployed     I reviewed social history and made relevant updates today.    Family Hx:   Family History   Problem Relation Age of Onset     Diabetes Mother         Diabetes     Diabetes Father         Diabetes     Cancer Father         Cancer,Skin, throat     Anesthesia Reaction No family hx of      Clotting Disorder No family hx of      Cerebrovascular Disease No family hx of        Objective  Vitals: reviewed in EMR.  /70 (BP Location: Right arm, Patient Position: Sitting, Cuff Size: Adult Regular)   Pulse 70   Temp 97  F (36.1  C) (Tympanic)   Resp 18   Ht 1.727 m (5' 8\")   Wt 63.2 kg (139 lb 4.8 oz)   BMI 21.18 kg/m      Gen: Pleasant male, NAD.  HEENT: MMM  Neck: Supple  Pulm: Breathing easily  Neuro: Grossly intact  Skin: No concerning lesions.  Psychiatric: Normal affect and insight. Does not " appear anxious or depressed.    Foot Exam:  5/31/2019  Intact to monofilament bilaterally.  Skin intact without erythema.    Diabetes Labs  Hemoglobin A1C (%)   Date Value   05/31/2019 10.3 (H)   11/01/2018 9.3 (H)   03/12/2018 7.1 (H)     Creatinine (mg/dL)   Date Value   05/31/2019 1.10   11/01/2018 1.05   03/12/2018 0.99   10/19/2017 0.99   01/22/2014 1.02     Glucose (mg/dL)   Date Value   05/31/2019 169 (H)   11/01/2018 318 (H)   03/12/2018 167 (H)   03/09/2018 120 (H)   10/20/2017 231 (H)   10/19/2017 330 (H)     Potassium (mmol/L)   Date Value   05/31/2019 4.1   11/01/2018 4.6   03/12/2018 4.0   10/19/2017 4.8   01/22/2014 4.4       Assessment    ICD-10-CM    1. Uncontrolled type 2 diabetes mellitus without complication, with long-term current use of insulin (H) E11.65 dulaglutide (TRULICITY) 1.5 MG/0.5ML pen    Z79.4 insulin detemir (LEVEMIR FLEXPEN/FLEXTOUCH) 100 UNIT/ML pen     insulin aspart (NOVOLOG FLEXPEN) 100 UNIT/ML pen     blood glucose (NO BRAND SPECIFIED) lancets standard     blood glucose (NO BRAND SPECIFIED) test strip   2. Displacement of lumbar intervertebral disc without myelopathy M51.26 meloxicam (MOBIC) 7.5 MG tablet   3. Chronic back pain, unspecified back location, unspecified back pain laterality M54.9 meloxicam (MOBIC) 7.5 MG tablet    G89.29      A1c reveals poor control.  He desires to return to Trulicity which is fine.  Insulin doses are increased as outlined below.  Close follow-up is recommended.  Lifestyle modification recommended including reduced intake of carbohydrate.  We had a lengthy discussion today with regards to the risks and benefits of meloxicam in the setting of chronic back pain.  I am concerned about the potential for future chronic kidney disease given uncontrolled diabetes.  He understands that risk and wants to continue using it.  We reduce the dosage to 7.5 mg, hopefully he will be able to take 1 tablet more often than 2, and hold the dose more often than  not.    Plan  Patient Instructions     Aspects of Diabetes we can improve:  Hemoglobin A1c Lab Results   Component Value Date    A1C 10.3 05/31/2019    A1C 9.3 11/01/2018    A1C 7.1 03/12/2018    Goal range is under 8. Best is 6.5 to 7   Blood Pressure 116/70 Goal to keep less than 140/90   Tobacco  reports that he quit smoking about 3 years ago. His smoking use included cigarettes. He has a 10.00 pack-year smoking history. He has never used smokeless tobacco. Goal to abstain from tobacco   Aspirin yes Aspirin reduces risk of heart disease and stroke   ACE/ARB lisinopril These medications reduce risk of kidney disease   Cholesterol simvastatin Statins reduce risk of heart disease and stroke   Eye Exam annual Annual diabetic eye exam   Healthy weight Body mass index is 21.18 kg/m . Goal BMI under 30, best is under 25.      -- I'm trying to exercise daily (goal at least 20 min/day) with moderate aerobic activity   -- Eat healthy (resources from ADA at http://www.diabetes.org/)   -- I'm taking good care of my feet. Consider seeing the Podiatrist   -- Check blood sugars as directed, record in log book and bring to every appointment   -- Check your sugars before all meals, and 2 hours after supper   -- Goal sugar before breakfast: under 140   -- Goal sugar 2 hours after supper: under 170   -- Increase Levemir to 18 units at bedtime   -- Increase Novolog to 10 units before meals   -- Next diabetes lab draw: 3 months   -- Next diabetes office visit: 2 weeks        Return in about 2 weeks (around 6/14/2019) for diabetes.    Signed, Adebayo Dubois MD  Internal Medicine & Pediatrics

## 2019-05-31 NOTE — TELEPHONE ENCOUNTER
Received fax from [facility] stating they need a new rx for pt's lancets/test strips with specific directions of how many times daily he is to use.  Unable to have and/or in the directions per medicare. They are requesting a new rx be sent over.  Denice Camargo LPN ....................  5/31/2019   11:51 AM

## 2019-06-01 ASSESSMENT — ANXIETY QUESTIONNAIRES: GAD7 TOTAL SCORE: 9

## 2019-06-04 ENCOUNTER — TELEPHONE (OUTPATIENT)
Dept: PEDIATRICS | Facility: OTHER | Age: 49
End: 2019-06-04

## 2019-06-04 NOTE — TELEPHONE ENCOUNTER
Received fax from [facility] stating they need a new Rx with MAX daily dose on the Novolog RX.  Denice Camargo LPN ....................  6/4/2019   9:22 AM

## 2019-06-05 NOTE — TELEPHONE ENCOUNTER
After verifying last name and date of birth patient informed writer that he will run out of test strips before the insurance will allow refill of test strips. Patient was also wondering why he had 99 refills on h is prescription and if that was a typo.  Melissa Winters LPN on 6/5/2019 at 3:14 PM

## 2019-06-06 NOTE — TELEPHONE ENCOUNTER
The more he checks, the happier I am.  How many does he need per month?    Signed, Adebayo Dubois MD  Internal Medicine & Pediatrics

## 2019-06-06 NOTE — TELEPHONE ENCOUNTER
When I talked with him earlier he was doing it up to 6 times daily.  So 600 strips should cover him for 90 days if he checks it 6 times daily or 200 per month. Did pend order for 90 days worth for lancets and strips.  Denice Camargo LPN ....................  6/6/2019   11:12 AM

## 2019-06-06 NOTE — TELEPHONE ENCOUNTER
Spoke to [facility] and the strips are covered he has just been testing 5-6 times per day he used them faster.  His insurance will not cover any strips until the 15th.  Did you want him to check it more than 4 times a day?  Denice Camargo LPN ....................  6/6/2019   9:26 AM

## 2019-06-06 NOTE — TELEPHONE ENCOUNTER
Strips refilled in May.  Only [facility] pushes back on this.      Signed, Adebayo Dubois MD  Internal Medicine & Pediatrics

## 2019-08-06 ENCOUNTER — OFFICE VISIT (OUTPATIENT)
Dept: FAMILY MEDICINE | Facility: OTHER | Age: 49
End: 2019-08-06
Attending: PHYSICIAN ASSISTANT
Payer: COMMERCIAL

## 2019-08-06 VITALS
WEIGHT: 134.8 LBS | OXYGEN SATURATION: 99 % | HEIGHT: 68 IN | DIASTOLIC BLOOD PRESSURE: 64 MMHG | BODY MASS INDEX: 20.43 KG/M2 | SYSTOLIC BLOOD PRESSURE: 112 MMHG | HEART RATE: 106 BPM | TEMPERATURE: 99.3 F | RESPIRATION RATE: 17 BRPM

## 2019-08-06 DIAGNOSIS — L02.414 ABSCESS OF ARM, LEFT: Primary | ICD-10-CM

## 2019-08-06 PROCEDURE — G0463 HOSPITAL OUTPT CLINIC VISIT: HCPCS

## 2019-08-06 PROCEDURE — 10060 I&D ABSCESS SIMPLE/SINGLE: CPT | Performed by: PHYSICIAN ASSISTANT

## 2019-08-06 PROCEDURE — 99213 OFFICE O/P EST LOW 20 MIN: CPT | Mod: 25 | Performed by: PHYSICIAN ASSISTANT

## 2019-08-06 PROCEDURE — G0463 HOSPITAL OUTPT CLINIC VISIT: HCPCS | Mod: 25

## 2019-08-06 RX ORDER — MUPIROCIN 20 MG/G
OINTMENT TOPICAL 3 TIMES DAILY
Qty: 15 G | Refills: 0 | Status: SHIPPED | OUTPATIENT
Start: 2019-08-06 | End: 2019-08-13

## 2019-08-06 RX ORDER — SULFAMETHOXAZOLE/TRIMETHOPRIM 800-160 MG
1 TABLET ORAL 2 TIMES DAILY
Qty: 14 TABLET | Refills: 0 | Status: SHIPPED | OUTPATIENT
Start: 2019-08-06 | End: 2019-08-13

## 2019-08-06 ASSESSMENT — MIFFLIN-ST. JEOR: SCORE: 1450.95

## 2019-08-06 ASSESSMENT — PAIN SCALES - GENERAL: PAINLEVEL: SEVERE PAIN (6)

## 2019-08-06 NOTE — PROGRESS NOTES
HPI:    Rick Contreras is a 49 year old male who presents to clinic today for evaluation of skin lesion on his left arm  Onset 2-3 weeks ago, got worse yesterday  Associated symptoms: boil on left arm, he has been picking it. He heated up a pin and then poked it to drain it. He has gotten purulent drainage from it. Today he noted that there was some surrounding redness and it was painful. Pain 6/10 with touch or bumping it.     He has diabetes, blood glucose is stable, today 185 in his normal range    Past Medical History:   Diagnosis Date     Other constipation     No Comments Provided     Personal history of other medical treatment (CODE)     No Comments Provided       Current Outpatient Medications   Medication Sig Dispense Refill     blood glucose (NO BRAND SPECIFIED) lancets standard Test blood sugar 6 times daily.  Dispense item as covered by patient's insurance. 600 each 11     blood glucose (NO BRAND SPECIFIED) test strip Test blood sugar 6 times daily. Dispense item as covered by patient's insurance. 600 strip 11     blood glucose monitoring (NO BRAND SPECIFIED) meter device kit Dispense option covered by insurance. Test blood sugar 4 times daily or as directed. 1 kit 11     dulaglutide (TRULICITY) 1.5 MG/0.5ML pen Inject 1.5 mg Subcutaneous every 7 days 2 mL 3     insulin aspart (NOVOLOG FLEXPEN) 100 UNIT/ML pen Inject 10 Units Subcutaneous 3 times daily (with meals) +Correction: 1 unit for 150-199, 2 unit 200-249, 3 unit 250-299, 4 unit > 300. 30 mL 11     insulin detemir (LEVEMIR FLEXPEN/FLEXTOUCH) 100 UNIT/ML pen Inject 18 Units Subcutaneous At Bedtime 30 mL 1     insulin pen needle (B-D U/F) 31G X 8 MM Use 4 daily or as directed. 100 each prn     Lancets 30G MISC Dispense item covered by pt ins. E11.65 NIDDM type II, uncontrolled - Test 4 times/day. Reason: New diabetes       meloxicam (MOBIC) 7.5 MG tablet Take 1-2 tablets (7.5-15 mg) by mouth nightly as needed for moderate pain 60 tablet 3      "aspirin EC 81 MG EC tablet Take 81 mg by mouth daily with food       lisinopril (PRINIVIL/ZESTRIL) 2.5 MG tablet Take 2.5 mg by mouth daily       polyethylene glycol (MIRALAX/GLYCOLAX) powder Take 17 g by mouth 2 times daily       simvastatin (ZOCOR) 20 MG tablet Take 20 mg by mouth At Bedtime       traZODone (DESYREL) 50 MG tablet Take 1 tablet (50 mg) by mouth nightly as needed for sleep (Patient not taking: Reported on 8/6/2019) 30 tablet 1       Allergies   Allergen Reactions     Metformin Nausea and Other (See Comments)     Abdominal cramping       ROS:  General: feels well, no fever  Skin: boil on left forearm    EXAM:  Vitals:    08/06/19 1602   BP: 112/64   Pulse: 106   Resp: 17   Temp: 99.3  F (37.4  C)   TempSrc: Tympanic   SpO2: 99%   Weight: 61.1 kg (134 lb 12.8 oz)   Height: 1.727 m (5' 8\")     General appearance: well appearing male, in no acute distress  Musculoskeletal: left forearm, scabbed lesion inferior to abscess healing. Abscess measures 2 cm  X 4 cm over proximal dorsal forearm. There is an area that has been picked open and is scabbed. Area is fluctuant. Surrounding mild erythema and warmth  Elbow - normal range of motion, non tender  Psychological: normal affect, alert and pleasant      ASSESSMENT AND PLAN:    1. Abscess of arm, left        [unfilled]    Incision and drainage  Date/Time: 8/6/2019 4:30 PM  Performed by: Radha Torre PA-C  Authorized by: Radha Torre PA-C     UNIVERSAL PROTOCOL   Site Marked: No  Prior Images Obtained and Reviewed:  No  Required items: Required blood products, implants, devices and special equipment available    Patient identity confirmed:  Verbally with patient and hospital-assigned identification number  NA - No sedation, light sedation, or local anesthesia  Confirmation Checklist:  Patient's identity using two indicators  Time out: Immediately prior to the procedure a time out was called    Preparation: Patient was prepped and draped in usual sterile " fashion      ANESTHESIA  Local infiltration  Local anesthesia used: yes  Local Anesthetic: lidocaine 1% with epinephrine  Anesthetic total: 4 mL    SEDATION    Patient Sedated: No      Type: abscess  Body area: upper extremity  Location details: left arm  Scalpel size: 11  Incision type: single straight  Incision depth: dermal  Complexity: simple  Drainage: bloody  Drainage amount: scant  Wound treatment: wound left open  Packing material: none    PROCEDURE   Time of Sedation in Minutes by Physician:  Aysha      .procdo    Abscess with cellulitis left forearm  Incision and drainage in [facility] today, blood drainage only  Keep packing material in wound until you have a wound check in 1-3 days  Start bactrim DS, one tablet, twice daily for 7 days.   Wash daily with warm soapy water, cover with topical antibiotic ointment  Change dressing daily or more often if soiled  Watch for signs of infection  Tylenol or ibuprofen as needed for discomfort  Follow up with PCP or surgery for recheck in 2-3 days  Patient received verbal and written instruction including review of warning signs    Radha Torre PA-C on 8/9/2019 at 8:40 PM

## 2019-08-06 NOTE — PATIENT INSTRUCTIONS
Abscess with cellulitis left forearm  Incision and drainage in [facility] today  Keep packing material in wound until you have a wound check in 1-3 days  Start bactrim DS, one tablet, twice daily for 7 days.   Wash daily with warm soapy water, cover with topical antibiotic ointment  Change dressing daily or more often if soiled  Watch for signs of infection  Tylenol or ibuprofen as needed for discomfort  Follow up with PCP or surgery for recheck in 2-3 days  Seek immediate care for:      An increase in redness or swelling     Red streaks in the skin leading away from the abscess     An increase in local pain or swelling     Fever of 100.4 or higher     Pus or fluid coming from the abscess   Patient Education     Abscess (Incision & Drainage)  An abscess is sometimes called a boil. It happens when bacteria get trapped under the skin and start to grow. Pus forms inside the abscess as the body responds to the bacteria. An abscess can happen with an insect bite, ingrown hair, blocked oil gland, pimple, cyst, or puncture wound.  Your healthcare provider has drained the pus from your abscess. If the abscess pocket was large, your healthcare provider may have put in gauze packing. Your provider will need to remove it on your next visit. He or she may also replace it at that time. You may not need antibiotics to treat a simple abscess, unless the infection is spreading into the skin around the wound (cellulitis).  The wound will take about 1 to 2 weeks to heal, depending on the size of the abscess. Healthy tissue will grow from the bottom and sides of the opening until it seals over.  Home care  These tips can help your wound heal:    The wound may drain for the first 2 days. Cover the wound with a clean dry dressing. Change the dressing if it becomes soaked with blood or pus.    If a gauze packing was placed inside the abscess pocket, you may be told to remove it yourself. You may do this in the shower. Once the packing  is removed, you should wash the area in the shower, or clean the area as directed by your provider. Continue to do this until the skin opening has closed. Make sure you wash your hands after changing the packing or cleaning the wound.    If you were prescribed antibiotics, take them as directed until they are all gone.    You may use acetaminophen or ibuprofen to control pain, unless another pain medicine was prescribed. If you have liver disease or ever had a stomach ulcer, talk with your doctor before using these medicines.  Follow-up care  Follow up with your healthcare provider, or as advised. If a gauze packing was put in your wound, it should be removed in 1 to 2 days. Check your wound every day for any signs that the infection is getting worse. The signs are listed below.  When to seek medical advice  Call your healthcare provider right away if any of these occur:    Increasing redness or swelling    Red streaks in the skin leading away from the wound    Increasing local pain or swelling    Continued pus draining from the wound 2 days after treatment    Fever of 100.4 F (38 C) or higher, or as directed by your healthcare provider    Boil returns when you are at home  Date Last Reviewed: 9/1/2016 2000-2018 [employer]. [address] [place] [address], [place] [address]. All rights reserved. This information is not intended as a substitute for professional medical care. Always follow your healthcare professional's instructions.

## 2019-08-06 NOTE — NURSING NOTE
"Chief Complaint   Patient presents with     Derm Problem     left arm     Patient is here for a painful skin lesion on his right arm that started as a zit 2-3 weeks ago. Patient states that he popped it and was able to get a bunch of pus out of it. Patient states that he is diabetic. Patient had a bandaid on it with antibiotic ointment but took it off just before coming here. Patient states he has not taken anything for pain.      Initial /64   Pulse 106   Temp 99.3  F (37.4  C) (Tympanic)   Resp 17   Ht 1.727 m (5' 8\")   Wt 61.1 kg (134 lb 12.8 oz)   SpO2 99%   BMI 20.50 kg/m   Estimated body mass index is 20.5 kg/m  as calculated from the following:    Height as of this encounter: 1.727 m (5' 8\").    Weight as of this encounter: 61.1 kg (134 lb 12.8 oz).  Medication Reconciliation: complete    Christal Davis LPN  "

## 2019-08-21 DIAGNOSIS — M51.26 HERNIATED LUMBAR INTERVERTEBRAL DISC: Primary | ICD-10-CM

## 2019-08-26 RX ORDER — MELOXICAM 15 MG/1
TABLET ORAL
Qty: 90 TABLET | Refills: 0 | Status: SHIPPED | OUTPATIENT
Start: 2019-08-26 | End: 2019-11-18

## 2019-08-26 NOTE — TELEPHONE ENCOUNTER
Called patient and verified name and date of birth. Informed him of medication refill and no further questions.    Shaniqua Reed LPN on 8/26/2019 at 3:04 PM

## 2019-08-26 NOTE — TELEPHONE ENCOUNTER
Routing refill request to provider for review/approval because:   Normal ALT on file in past 12 months    Normal AST on file in past 12 months    Normal CBC on file in past 12 months     LOV: 5/31/19  \Rosa Pickard RN on 8/26/2019 at 2:14 PM

## 2019-11-18 DIAGNOSIS — G47.00 INSOMNIA, UNSPECIFIED TYPE: ICD-10-CM

## 2019-11-18 DIAGNOSIS — M51.26 HERNIATED LUMBAR INTERVERTEBRAL DISC: ICD-10-CM

## 2019-11-20 RX ORDER — MELOXICAM 15 MG/1
TABLET ORAL
Qty: 90 TABLET | Refills: 0 | Status: SHIPPED | OUTPATIENT
Start: 2019-11-20 | End: 2020-02-13

## 2019-11-20 RX ORDER — TRAZODONE HYDROCHLORIDE 50 MG/1
TABLET, FILM COATED ORAL
Qty: 30 TABLET | Refills: 0 | Status: SHIPPED | OUTPATIENT
Start: 2019-11-20 | End: 2020-02-13

## 2019-11-20 NOTE — TELEPHONE ENCOUNTER
Routing refill request to provider for review/approval because:  Labs not current:     Normal ALT on file in past 12 months    Normal AST on file in past 12 months    Normal CBC on file in past 12 months     LOV 5/31/19  Dr. Dubois out of office will route to covering team let for review and consideration.  Rosa Pickard RN on 11/20/2019 at 4:10 PM                                        Prescription approved per [facility] Refill Protocol.  LOV: 5/31/19    Patient to follow up in 2 weeks and no follow up noted.  Called and informed patient he is due for diabetic follow up and transferred to scheduling.    Rosa Pickard RN on 11/20/2019 at 4:09 PM

## 2019-11-26 NOTE — TELEPHONE ENCOUNTER
Refill Request for: insulin pen needle (RELION PEN NEEDLE) 31G X 8 MM miscellaneous   Received From: [facility]  Last Written Prescription Date: 11/20/2019 for 400 each and 0 refills  LOV: 5/31/2019 with PCP  Next Appointment: 12/9/2019 with PCP  Protocol: Diabetic Supplies Protocol Passed - 11/26 8:29 AM    Refill request denied; too soon, last written prescription should provide adequate medication supply.       Phyllis HAYN, RN on 11/26/2019 at 8:32 AM

## 2020-02-13 ENCOUNTER — TELEPHONE (OUTPATIENT)
Dept: PEDIATRICS | Facility: OTHER | Age: 50
End: 2020-02-13

## 2020-02-13 ENCOUNTER — OFFICE VISIT (OUTPATIENT)
Dept: PEDIATRICS | Facility: OTHER | Age: 50
End: 2020-02-13
Attending: INTERNAL MEDICINE
Payer: COMMERCIAL

## 2020-02-13 VITALS
TEMPERATURE: 97.2 F | RESPIRATION RATE: 16 BRPM | OXYGEN SATURATION: 97 % | SYSTOLIC BLOOD PRESSURE: 120 MMHG | WEIGHT: 150.2 LBS | DIASTOLIC BLOOD PRESSURE: 76 MMHG | HEART RATE: 74 BPM | BODY MASS INDEX: 22.76 KG/M2 | HEIGHT: 68 IN

## 2020-02-13 DIAGNOSIS — M79.672 FOOT PAIN, BILATERAL: ICD-10-CM

## 2020-02-13 DIAGNOSIS — E03.9 ACQUIRED HYPOTHYROIDISM: Primary | ICD-10-CM

## 2020-02-13 DIAGNOSIS — M79.671 FOOT PAIN, BILATERAL: ICD-10-CM

## 2020-02-13 DIAGNOSIS — G47.00 INSOMNIA, UNSPECIFIED TYPE: ICD-10-CM

## 2020-02-13 DIAGNOSIS — M51.26 HERNIATED LUMBAR INTERVERTEBRAL DISC: ICD-10-CM

## 2020-02-13 DIAGNOSIS — M54.9 CHRONIC BACK PAIN, UNSPECIFIED BACK LOCATION, UNSPECIFIED BACK PAIN LATERALITY: ICD-10-CM

## 2020-02-13 DIAGNOSIS — M51.26 DISPLACEMENT OF LUMBAR INTERVERTEBRAL DISC WITHOUT MYELOPATHY: ICD-10-CM

## 2020-02-13 DIAGNOSIS — Z12.11 SPECIAL SCREENING FOR MALIGNANT NEOPLASMS, COLON: ICD-10-CM

## 2020-02-13 DIAGNOSIS — Z79.1 NSAID LONG-TERM USE: ICD-10-CM

## 2020-02-13 DIAGNOSIS — Z11.4 ENCOUNTER FOR SCREENING FOR HIV: ICD-10-CM

## 2020-02-13 DIAGNOSIS — Z13.29 SCREENING FOR THYROID DISORDER: ICD-10-CM

## 2020-02-13 DIAGNOSIS — G89.29 CHRONIC BACK PAIN, UNSPECIFIED BACK LOCATION, UNSPECIFIED BACK PAIN LATERALITY: ICD-10-CM

## 2020-02-13 LAB
ANION GAP SERPL CALCULATED.3IONS-SCNC: 7 MMOL/L (ref 3–14)
BUN SERPL-MCNC: 18 MG/DL (ref 7–25)
CALCIUM SERPL-MCNC: 9.4 MG/DL (ref 8.6–10.3)
CHLORIDE SERPL-SCNC: 102 MMOL/L (ref 98–107)
CO2 SERPL-SCNC: 28 MMOL/L (ref 21–31)
CREAT SERPL-MCNC: 1.08 MG/DL (ref 0.7–1.3)
GFR SERPL CREATININE-BSD FRML MDRD: 72 ML/MIN/{1.73_M2}
GLUCOSE SERPL-MCNC: 321 MG/DL (ref 70–105)
HBA1C MFR BLD: 9.4 % (ref 4–6)
HIV1+2 AB SPEC QL IA.RAPID: NONREACTIVE
POTASSIUM SERPL-SCNC: 4.4 MMOL/L (ref 3.5–5.1)
SODIUM SERPL-SCNC: 137 MMOL/L (ref 134–144)
TSH SERPL DL<=0.05 MIU/L-ACNC: 33 IU/ML (ref 0.34–5.6)

## 2020-02-13 PROCEDURE — G0463 HOSPITAL OUTPT CLINIC VISIT: HCPCS | Mod: 25

## 2020-02-13 PROCEDURE — 86703 HIV-1/HIV-2 1 RESULT ANTBDY: CPT | Mod: ZL | Performed by: INTERNAL MEDICINE

## 2020-02-13 PROCEDURE — G0008 ADMIN INFLUENZA VIRUS VAC: HCPCS

## 2020-02-13 PROCEDURE — 36415 COLL VENOUS BLD VENIPUNCTURE: CPT | Mod: ZL | Performed by: INTERNAL MEDICINE

## 2020-02-13 PROCEDURE — 80048 BASIC METABOLIC PNL TOTAL CA: CPT | Mod: ZL | Performed by: INTERNAL MEDICINE

## 2020-02-13 PROCEDURE — 90686 IIV4 VACC NO PRSV 0.5 ML IM: CPT

## 2020-02-13 PROCEDURE — 84443 ASSAY THYROID STIM HORMONE: CPT | Mod: ZL | Performed by: INTERNAL MEDICINE

## 2020-02-13 PROCEDURE — G0463 HOSPITAL OUTPT CLINIC VISIT: HCPCS

## 2020-02-13 PROCEDURE — 99214 OFFICE O/P EST MOD 30 MIN: CPT | Performed by: INTERNAL MEDICINE

## 2020-02-13 PROCEDURE — 83036 HEMOGLOBIN GLYCOSYLATED A1C: CPT | Mod: ZL | Performed by: INTERNAL MEDICINE

## 2020-02-13 PROCEDURE — 90471 IMMUNIZATION ADMIN: CPT

## 2020-02-13 PROCEDURE — 99000 SPECIMEN HANDLING OFFICE-LAB: CPT

## 2020-02-13 RX ORDER — TRAZODONE HYDROCHLORIDE 50 MG/1
50 TABLET, FILM COATED ORAL
Qty: 90 TABLET | Refills: 3 | Status: SHIPPED | OUTPATIENT
Start: 2020-02-13 | End: 2021-02-26 | Stop reason: ALTCHOICE

## 2020-02-13 RX ORDER — ASPIRIN 81 MG/1
81 TABLET ORAL
Qty: 90 TABLET | Refills: 3 | Status: SHIPPED | OUTPATIENT
Start: 2020-02-13 | End: 2023-12-26

## 2020-02-13 RX ORDER — MELOXICAM 7.5 MG/1
7.5-15 TABLET ORAL DAILY PRN
Qty: 180 TABLET | Refills: 3 | Status: SHIPPED | OUTPATIENT
Start: 2020-02-13 | End: 2021-06-21

## 2020-02-13 RX ORDER — LISINOPRIL 2.5 MG/1
2.5 TABLET ORAL DAILY
Qty: 90 TABLET | Refills: 3 | Status: SHIPPED | OUTPATIENT
Start: 2020-02-13 | End: 2020-11-27 | Stop reason: SINTOL

## 2020-02-13 RX ORDER — LEVOTHYROXINE SODIUM 100 UG/1
100 TABLET ORAL DAILY
Qty: 90 TABLET | Refills: 1 | Status: SHIPPED | OUTPATIENT
Start: 2020-02-13 | End: 2020-07-24

## 2020-02-13 RX ORDER — SIMVASTATIN 20 MG
20 TABLET ORAL AT BEDTIME
Qty: 90 TABLET | Refills: 3 | Status: SHIPPED | OUTPATIENT
Start: 2020-02-13 | End: 2020-03-19

## 2020-02-13 ASSESSMENT — PAIN SCALES - GENERAL: PAINLEVEL: SEVERE PAIN (6)

## 2020-02-13 ASSESSMENT — ANXIETY QUESTIONNAIRES
3. WORRYING TOO MUCH ABOUT DIFFERENT THINGS: NEARLY EVERY DAY
GAD7 TOTAL SCORE: 9
IF YOU CHECKED OFF ANY PROBLEMS ON THIS QUESTIONNAIRE, HOW DIFFICULT HAVE THESE PROBLEMS MADE IT FOR YOU TO DO YOUR WORK, TAKE CARE OF THINGS AT HOME, OR GET ALONG WITH OTHER PEOPLE: VERY DIFFICULT
7. FEELING AFRAID AS IF SOMETHING AWFUL MIGHT HAPPEN: NEARLY EVERY DAY
2. NOT BEING ABLE TO STOP OR CONTROL WORRYING: NOT AT ALL
6. BECOMING EASILY ANNOYED OR IRRITABLE: NOT AT ALL
5. BEING SO RESTLESS THAT IT IS HARD TO SIT STILL: NOT AT ALL
1. FEELING NERVOUS, ANXIOUS, OR ON EDGE: NEARLY EVERY DAY

## 2020-02-13 ASSESSMENT — PATIENT HEALTH QUESTIONNAIRE - PHQ9
SUM OF ALL RESPONSES TO PHQ QUESTIONS 1-9: 19
5. POOR APPETITE OR OVEREATING: NOT AT ALL

## 2020-02-13 ASSESSMENT — MIFFLIN-ST. JEOR: SCORE: 1515.8

## 2020-02-13 NOTE — NURSING NOTE
Patient presents to clinic for diabetic check.  Denice Jaime LPN ....................  2/13/2020   7:44 AM    No LMP for male patient.  Medication Reconciliation: complete    Denice Jaime LPN  2/13/2020 7:44 AM      Previous A1C is not at goal of <8  Lab Results   Component Value Date    A1C 10.3 05/31/2019    A1C 9.3 11/01/2018    A1C 7.1 03/12/2018     Urine microalbumin:creatine: DUE  Foot exam 5/31/19  Eye exam 12/20/18    Tobacco User NO  Patient is NOT on a daily aspirin  Patient is NOT on a Statin.  Blood pressure today of:     BP Readings from Last 1 Encounters:   02/13/20 120/76      is at the goal of <139/89 for diabetics.    Denice Jaime LPN on 2/13/2020 at 7:44 AM

## 2020-02-13 NOTE — PATIENT INSTRUCTIONS
-- Start using Levemir 15 units   -- Continue Novolog 8 units with meals   -- Start Novolog correction: 1 unit for 150-199, 2 units for 200-249, 3 units for 250-299, 4 units for > 300.   -- Consult to Michelle for insulin pump and CGM   -- Restart aspirin, lisinopril and simvastatin     -- Occ Med & PT for chronic low back: goals to reduce dosage/frequency of Mobic and reduce pain scores     -- Cologuard for colon cancer screen     -- Get the new shingles vaccine series from a nurse-only visit, pharmacy or [facility] ([facility] RN).     -- [facility] for orthotics and diabetic shoes    Aspects of Diabetes we can improve:  Hemoglobin A1c Lab Results   Component Value Date    A1C 10.3 05/31/2019    A1C 9.3 11/01/2018    A1C 7.1 03/12/2018    Goal range is under 8. Best is 6.5 to 7   Blood Pressure 120/76 Goal to keep less than 140/90   Tobacco  reports that he quit smoking about 4 years ago. His smoking use included cigarettes. He has a 10.00 pack-year smoking history. He has never used smokeless tobacco. Goal to abstain from tobacco   Aspirin restart Aspirin reduces risk of heart disease and stroke   ACE/ARB restart These medications reduce risk of kidney disease   Cholesterol restart Statins reduce risk of heart disease and stroke   Eye Exam 12/19 Annual diabetic eye exam   Healthy weight Body mass index is 22.84 kg/m . Goal BMI under 30, best is under 25.      -- I'm trying to exercise daily (goal at least 20 min/day) with moderate aerobic activity   -- Eat healthy (resources from ADA at http://www.diabetes.org/)   -- I'm taking good care of my feet. Consider seeing the Podiatrist   -- Check blood sugars as directed, record in log book and bring to every appointment   -- Goal sugar before breakfast: under 140   -- Goal sugar 2 hours after supper: under 170   -- Next diabetes lab draw: 3 months   -- Next diabetes office visit: 3 months

## 2020-02-13 NOTE — LETTER
2020      Rick Contreras  [address]   [place] [address]        Dear ,    We are writing to inform you of your test results.    As discussed.  A1c too high, hope you can get the insulin pump and continuous meter.  Thyroid level too high, meaning underactive thyroid.  Start the levothyroxine medicine and we should recheck in 2 months.    Signed, Adebayo Dubois MD, FAAP, FACP  Internal Medicine & Pediatrics      Resulted Orders   Basic metabolic panel   Result Value Ref Range    Sodium 137 134 - 144 mmol/L    Potassium 4.4 3.5 - 5.1 mmol/L    Chloride 102 98 - 107 mmol/L    Carbon Dioxide 28 21 - 31 mmol/L    Anion Gap 7 3 - 14 mmol/L    Glucose 321 (H) 70 - 105 mg/dL    Urea Nitrogen 18 7 - 25 mg/dL    Creatinine 1.08 0.70 - 1.30 mg/dL    GFR Estimate 72 >60 mL/min/[1.73_m2]    GFR Estimate If Black 88 >60 mL/min/[1.73_m2]    Calcium 9.4 8.6 - 10.3 mg/dL   HIV Rapid Antibody Screen   Result Value Ref Range    HIV Rapid Antibody Screen Nonreactive NR^Nonreactive      Comment:      Nonreactive for HIV-1 and/or HIV-2 antibodies.  All rapid HIV 1/2 Ab (3rd Generation) tests must be confirmed by a second   non-rapid HIV 1/2 Ag/Ab Combination (4th Generation) test.  This assay has not   been evaluated for  screening, cord blood specimens or individuals   less than 12 years of age.     Thyrotropin GH   Result Value Ref Range    Thyrotropin 33.00 (H) 0.34 - 5.60 IU/mL   Hemoglobin A1c   Result Value Ref Range    Hemoglobin A1C 9.4 (H) 4.0 - 6.0 %       If you have any questions or concerns, please call the clinic at the number listed above.       Sincerely,        Adebayo Dubois MD

## 2020-02-13 NOTE — NURSING NOTE
Immunization Documentation    Prior to Immunization administration, verified patients identity using patient's name and date of birth. Please see IMMUNIZATIONS  and order for additional information.  Patient / Parent instructed to remain in clinic for 15 minutes and report any adverse reaction to staff immediately.    Was entire vial of medication used? Yes  Vial/Syringe: Syringe    Denice Jaime LPN  2/13/2020   8:35 AM

## 2020-02-13 NOTE — TELEPHONE ENCOUNTER
Spoke with Mr. Contreras.  He's been feeling more down, constipated and cold. Reviewed TSH.      ICD-10-CM    1. Acquired hypothyroidism E03.9 levothyroxine (SYNTHROID/LEVOTHROID) 100 MCG tablet     Thyrotropin GH      Orders Placed This Encounter   Medications     levothyroxine (SYNTHROID/LEVOTHROID) 100 MCG tablet     Sig: Take 1 tablet (100 mcg) by mouth daily     Dispense:  90 tablet     Refill:  1     Orders Placed This Encounter   Procedures     Thyrotropin GH      -- Start levothyroxine   -- TSH in 2 months    Signed, Adebayo Dubois MD, FAAP, FACP  Internal Medicine & Pediatrics

## 2020-02-14 ENCOUNTER — TELEPHONE (OUTPATIENT)
Dept: PEDIATRICS | Facility: OTHER | Age: 50
End: 2020-02-14

## 2020-02-14 ASSESSMENT — ANXIETY QUESTIONNAIRES: GAD7 TOTAL SCORE: 9

## 2020-02-14 NOTE — TELEPHONE ENCOUNTER
Pharmacy would like to clarify max number of units per day on the Novolog flexpen to bill insurance.Sonali Cordova LPN .......................2/14/2020  2:14 PM
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
## 2020-02-20 ENCOUNTER — OFFICE VISIT (OUTPATIENT)
Dept: FAMILY MEDICINE | Facility: OTHER | Age: 50
End: 2020-02-20
Attending: INTERNAL MEDICINE

## 2020-02-20 VITALS
HEART RATE: 88 BPM | WEIGHT: 149 LBS | RESPIRATION RATE: 16 BRPM | HEIGHT: 68 IN | TEMPERATURE: 98.2 F | DIASTOLIC BLOOD PRESSURE: 70 MMHG | BODY MASS INDEX: 22.58 KG/M2 | SYSTOLIC BLOOD PRESSURE: 120 MMHG

## 2020-02-20 DIAGNOSIS — M54.9 CHRONIC BACK PAIN, UNSPECIFIED BACK LOCATION, UNSPECIFIED BACK PAIN LATERALITY: Primary | ICD-10-CM

## 2020-02-20 DIAGNOSIS — G89.29 CHRONIC BACK PAIN, UNSPECIFIED BACK LOCATION, UNSPECIFIED BACK PAIN LATERALITY: Primary | ICD-10-CM

## 2020-02-20 DIAGNOSIS — G89.29 CHRONIC BACK PAIN, UNSPECIFIED BACK LOCATION, UNSPECIFIED BACK PAIN LATERALITY: ICD-10-CM

## 2020-02-20 DIAGNOSIS — M54.9 CHRONIC BACK PAIN, UNSPECIFIED BACK LOCATION, UNSPECIFIED BACK PAIN LATERALITY: ICD-10-CM

## 2020-02-20 PROCEDURE — 99207: CPT | Performed by: CHIROPRACTOR

## 2020-02-20 ASSESSMENT — PAIN SCALES - GENERAL: PAINLEVEL: SEVERE PAIN (7)

## 2020-02-20 ASSESSMENT — MIFFLIN-ST. JEOR: SCORE: 1510.36

## 2020-02-20 NOTE — PROGRESS NOTES
Spine Therapy Program Consultation Report      Patient referred by: Dr. Adebayo Dubois    Condition: Grade 1 spondylolisthesis, degenerative disc disease L2-4    Review of records performed: YES    Candidate for program: YES      Last image available for my review is an MRI on 8/14/2014.      He has made multiple attempts for disability.  No impairment has been assigned to his spine.  He has also apparently consulted with a spine surgeon who told him surgery was not an option.    This condition typically responds favorably to physical therapy and routine core strengthening exercises.  He should achieve excellent results with this type of program should he commit to the schedule and continue his HEP.  Option of impairment rating exam discussed with him if no improvement as a result of the Spine Therapy Program.    Patient will return four weeks after the start of the program for reassessment and progress report.    Thank you for your referral,      Reyes Ogden DC, LAURA  Director - Occupational Medicine Department  [place] [id number]  Phone [phone]  Fax [phone]    Disclaimer:  This note consists of words and symbols derived from keyboarding, dictation, or using voice recognition software. As a result, there may be errors in the script that have gone undetected. Please consider this when interpreting information found in this note.    2:56 PM 2/20/2020

## 2020-02-20 NOTE — NURSING NOTE
Rick Contreras is a 50 year old male presenting today for evaluation and possible referral to the Spine Program.   Diagnosis: Chronic Low Back Pain      Medication Reconciliation: complete    Review Of Systems  Skin: negative  Eyes: negative  Ears/Nose/Throat: negative  Respiratory: No shortness of breath, dyspnea on exertion, cough, or hemoptysis  Cardiovascular: negative  Gastrointestinal: negative  Genitourinary: positive for and incontinence  Musculoskeletal: positive for back pain  Neurologic: negative  Psychiatric: negative  Hematologic/Lymphatic/Immunologic: negative  Endocrine: positive for thyroid disorder and diabetes    Sonali Pickard LPN  2/20/2020 1:50 PM

## 2020-02-24 ENCOUNTER — TRANSFERRED RECORDS (OUTPATIENT)
Dept: HEALTH INFORMATION MANAGEMENT | Facility: OTHER | Age: 50
End: 2020-02-24

## 2020-02-24 LAB — COLOGUARD-ABSTRACT: NEGATIVE

## 2020-02-27 ENCOUNTER — TELEPHONE (OUTPATIENT)
Dept: EDUCATION SERVICES | Facility: OTHER | Age: 50
End: 2020-02-27

## 2020-02-27 ENCOUNTER — ALLIED HEALTH/NURSE VISIT (OUTPATIENT)
Dept: EDUCATION SERVICES | Facility: OTHER | Age: 50
End: 2020-02-27
Attending: INTERNAL MEDICINE
Payer: COMMERCIAL

## 2020-02-27 PROCEDURE — G0108 DIAB MANAGE TRN  PER INDIV: HCPCS

## 2020-02-27 NOTE — PROGRESS NOTES
"   Diabetes Education Consult for CGM and Insulin Pump    SUBJECTIVE:  Rick Contreras is an 50 year old male who presents for education regarding Continuous Glucose Monitoring (CGM) and insulin pump therapy for Type 2 diabetes mellitus.   Age at diagnosis 46.    Current treatment includes insulin injections.   BG range within the past two weeks:   48 - 600 mg/dL.  Patient SMBG 6 - 8 times daily.    Patient notes he usually experiences hypoglycemia around 4:00 am, and tests BG daily at 4:00 am.  \"I'll usually eat some carb and then go back to sleep.  Last night I took 12 units of Lantus instead of 15 units and did not have a low this morning.  I was 146 at 4am.\"     Does patient experience hypoglycemia unawareness?  No, I usually feel shaky.          No results found for: GLUCOSE  Cholesterol   Date/Time Value Ref Range Status   2019 08:35  <200 mg/dL Final     HDL Cholesterol   Date/Time Value Ref Range Status   2019 08:35 AM 49 23 - 92 mg/dL Final     LDL Cholesterol Calculated   Date/Time Value Ref Range Status   2019 08:35  (H) <100 mg/dL Final     Comment:     Above desirable:  100-129 mg/dl  Borderline High:  130-159 mg/dL  High:             160-189 mg/dL  Very high:       >189 mg/dl       No components found for: MICROALBCRU, [id number], MICROALBRAND    Social History     Tobacco Use     Smoking status: Former Smoker     Packs/day: 0.50     Years: 20.00     Pack years: 10.00     Types: Cigarettes     Last attempt to quit: 2015     Years since quittin.4     Smokeless tobacco: Never Used     Tobacco comment: Quit smoking: has chantix   Substance Use Topics     Alcohol use: Yes     Alcohol/week: 1.0 standard drinks     Comment: ocassionally       Current Outpatient Rx   Medication Sig Dispense Refill     aspirin 81 MG EC tablet Take 1 tablet (81 mg) by mouth daily with food 90 tablet 3     blood glucose (NO BRAND SPECIFIED) lancets standard Test blood sugar 6 times " daily.  Dispense item as covered by patient's insurance. 600 each 11     blood glucose (NO BRAND SPECIFIED) test strip Test blood sugar 6 times daily. Dispense item as covered by patient's insurance. 600 strip 11     blood glucose monitoring (NO BRAND SPECIFIED) meter device kit Dispense option covered by insurance. Test blood sugar 4 times daily or as directed. 1 kit 11     insulin aspart (NOVOLOG FLEXPEN) 100 UNIT/ML pen Inject 8 Units Subcutaneous 3 times daily (with meals) +Correction: 1 unit for 150-199, 2 unit 200-249, 3 unit 250-299, 4 unit > 300. 30 mL 11     insulin detemir (LEVEMIR FLEXTOUCH) 100 UNIT/ML pen Inject 15 Units Subcutaneous At Bedtime 15 mL 3     insulin pen needle (RELION PEN NEEDLE) 31G X 8 MM miscellaneous USE  4 TIMES DAILY OR  AS  DIRECTED 400 each 0     levothyroxine (SYNTHROID/LEVOTHROID) 100 MCG tablet Take 1 tablet (100 mcg) by mouth daily 90 tablet 1     lisinopril (PRINIVIL/ZESTRIL) 2.5 MG tablet Take 1 tablet (2.5 mg) by mouth daily 90 tablet 3     meloxicam (MOBIC) 7.5 MG tablet Take 1-2 tablets (7.5-15 mg) by mouth daily as needed for moderate pain 180 tablet 3     order for DME Diabetic shoes. DM2. 99 1 each 11     polyethylene glycol (MIRALAX/GLYCOLAX) powder Take 17 g by mouth 2 times daily       simvastatin (ZOCOR) 20 MG tablet Take 1 tablet (20 mg) by mouth At Bedtime 90 tablet 3     traZODone (DESYREL) 50 MG tablet Take 1 tablet (50 mg) by mouth nightly as needed for sleep 90 tablet 3        Allergies: Metformin     OBJECTIVE:  There were no vitals taken for this visit.      Patient visits for education using CGM and an insulin pump for tighter control of T2DM.  Discussed how the pump and CGM works as well as benefits and contraindications regarding CGM and pump therapy.  Patient shares he has a friend who has a pump and CGM that work together and he would really like a system like that.    Patient most interested in Tandem TSlim X2 with [employer] utilizing  "Dexcom G6 CGM without need for fingerstick calibrations.  Paperwork given with take home booklets for further review.  Patient decided he wants this pump and CGM soon.  He filled out paperwork during office visit today.  Documentation will be submitted today to begin insurance benefit investigation process.    Discussed current insulin use and wide fluctuations in BG readings.  Patient has decreased his Lantus from 15 to 12 units and FBG today 146 (no NOC hypoglycemia noted).  He will take Novolog 8 units before each meal consisting of 45 - 70 grams CHO.  He will add low dose Novolog Sliding Scale for correction of high BG, as needed.  Patient notes, \"I never need to add the extra units.\"    Insulin Recommendations:  Recommend patient continue Lantus 12 units at bedtime and recommend decrease in Novolog if a smaller meal is consumed.  Recommend Novolog 6 - 8 units for CHO, continue low dose Novolog Sliding Scale for correction of high BG, as needed.      Prepump education needed:  Carbohydrate Counting in Grams, Basics of Basal/Bolus.       PLAN:    Patient will follow up if any further questions or concerns.    Time spent with patient was 60 minutes.     Michelle Crow RN, BSN, CDE  2/27/2020 11:18 AM         "

## 2020-02-27 NOTE — TELEPHONE ENCOUNTER
Patient interested in Tandem TSlim X2 insulin pump integrated with Dexcom G6 CGM.      He would like to get started on CGM soon.    If accepted as written, please sign pending orders.    Thank you,    Michelle Crow RN, BSN, [facility]  2/27/2020 12:01 PM

## 2020-03-01 RX ORDER — PROCHLORPERAZINE 25 MG/1
SUPPOSITORY RECTAL
Qty: 9 EACH | Refills: 3 | Status: SHIPPED | OUTPATIENT
Start: 2020-03-01 | End: 2021-02-26 | Stop reason: ALTCHOICE

## 2020-03-01 RX ORDER — PROCHLORPERAZINE 25 MG/1
SUPPOSITORY RECTAL
Qty: 1 EACH | Refills: 0 | Status: SHIPPED | OUTPATIENT
Start: 2020-03-01 | End: 2021-02-26 | Stop reason: ALTCHOICE

## 2020-03-01 RX ORDER — PROCHLORPERAZINE 25 MG/1
SUPPOSITORY RECTAL
Qty: 1 EACH | Refills: 3 | Status: SHIPPED | OUTPATIENT
Start: 2020-03-01 | End: 2021-02-26 | Stop reason: ALTCHOICE

## 2020-03-03 ENCOUNTER — HOSPITAL ENCOUNTER (EMERGENCY)
Facility: OTHER | Age: 50
Discharge: HOME | End: 2020-03-03
Attending: EMERGENCY MEDICINE | Admitting: EMERGENCY MEDICINE
Payer: COMMERCIAL

## 2020-03-03 VITALS
HEIGHT: 68 IN | BODY MASS INDEX: 22.73 KG/M2 | RESPIRATION RATE: 16 BRPM | SYSTOLIC BLOOD PRESSURE: 117 MMHG | WEIGHT: 150 LBS | OXYGEN SATURATION: 97 % | TEMPERATURE: 96.5 F | HEART RATE: 78 BPM | DIASTOLIC BLOOD PRESSURE: 76 MMHG

## 2020-03-03 DIAGNOSIS — R42 DIZZINESS: ICD-10-CM

## 2020-03-03 LAB
ALBUMIN SERPL-MCNC: 4 G/DL (ref 3.5–5.7)
ALBUMIN UR-MCNC: NEGATIVE MG/DL
ALP SERPL-CCNC: 47 U/L (ref 34–104)
ALT SERPL W P-5'-P-CCNC: 17 U/L (ref 7–52)
ANION GAP SERPL CALCULATED.3IONS-SCNC: 7 MMOL/L (ref 3–14)
APPEARANCE UR: CLEAR
AST SERPL W P-5'-P-CCNC: 16 U/L (ref 13–39)
BASOPHILS # BLD AUTO: 0 10E9/L (ref 0–0.2)
BASOPHILS NFR BLD AUTO: 0.3 %
BILIRUB SERPL-MCNC: 0.6 MG/DL (ref 0.3–1)
BILIRUB UR QL STRIP: NEGATIVE
BUN SERPL-MCNC: 16 MG/DL (ref 7–25)
CALCIUM SERPL-MCNC: 8.8 MG/DL (ref 8.6–10.3)
CHLORIDE SERPL-SCNC: 102 MMOL/L (ref 98–107)
CO2 SERPL-SCNC: 26 MMOL/L (ref 21–31)
COLOR UR AUTO: ABNORMAL
CREAT SERPL-MCNC: 1.06 MG/DL (ref 0.7–1.3)
DIFFERENTIAL METHOD BLD: NORMAL
EOSINOPHIL # BLD AUTO: 0.1 10E9/L (ref 0–0.7)
EOSINOPHIL NFR BLD AUTO: 1.3 %
ERYTHROCYTE [DISTWIDTH] IN BLOOD BY AUTOMATED COUNT: 12.5 % (ref 10–15)
GFR SERPL CREATININE-BSD FRML MDRD: 74 ML/MIN/{1.73_M2}
GLUCOSE SERPL-MCNC: 299 MG/DL (ref 70–105)
GLUCOSE UR STRIP-MCNC: >1000 MG/DL
HCT VFR BLD AUTO: 41.9 % (ref 40–53)
HGB BLD-MCNC: 13.9 G/DL (ref 13.3–17.7)
HGB UR QL STRIP: NEGATIVE
IMM GRANULOCYTES # BLD: 0 10E9/L (ref 0–0.4)
IMM GRANULOCYTES NFR BLD: 0.4 %
KETONES UR STRIP-MCNC: NEGATIVE MG/DL
LEUKOCYTE ESTERASE UR QL STRIP: NEGATIVE
LYMPHOCYTES # BLD AUTO: 1.5 10E9/L (ref 0.8–5.3)
LYMPHOCYTES NFR BLD AUTO: 14.1 %
MCH RBC QN AUTO: 29.4 PG (ref 26.5–33)
MCHC RBC AUTO-ENTMCNC: 33.2 G/DL (ref 31.5–36.5)
MCV RBC AUTO: 89 FL (ref 78–100)
MONOCYTES # BLD AUTO: 0.6 10E9/L (ref 0–1.3)
MONOCYTES NFR BLD AUTO: 6.1 %
NEUTROPHILS # BLD AUTO: 8.2 10E9/L (ref 1.6–8.3)
NEUTROPHILS NFR BLD AUTO: 77.8 %
NITRATE UR QL: NEGATIVE
PH UR STRIP: 5 PH (ref 5–7)
PLATELET # BLD AUTO: 355 10E9/L (ref 150–450)
POTASSIUM SERPL-SCNC: 4.1 MMOL/L (ref 3.5–5.1)
PROT SERPL-MCNC: 6.6 G/DL (ref 6.4–8.9)
RBC # BLD AUTO: 4.73 10E12/L (ref 4.4–5.9)
SODIUM SERPL-SCNC: 135 MMOL/L (ref 134–144)
SOURCE: ABNORMAL
SP GR UR STRIP: 1.03 (ref 1–1.03)
TROPONIN I SERPL-MCNC: 3.8 PG/ML
UROBILINOGEN UR STRIP-MCNC: NORMAL MG/DL (ref 0–2)
WBC # BLD AUTO: 10.6 10E9/L (ref 4–11)

## 2020-03-03 PROCEDURE — 36415 COLL VENOUS BLD VENIPUNCTURE: CPT | Performed by: EMERGENCY MEDICINE

## 2020-03-03 PROCEDURE — 99283 EMERGENCY DEPT VISIT LOW MDM: CPT | Mod: Z6 | Performed by: EMERGENCY MEDICINE

## 2020-03-03 PROCEDURE — 93005 ELECTROCARDIOGRAM TRACING: CPT | Performed by: EMERGENCY MEDICINE

## 2020-03-03 PROCEDURE — 25000132 ZZH RX MED GY IP 250 OP 250 PS 637: Performed by: EMERGENCY MEDICINE

## 2020-03-03 PROCEDURE — 85025 COMPLETE CBC W/AUTO DIFF WBC: CPT | Performed by: EMERGENCY MEDICINE

## 2020-03-03 PROCEDURE — 81003 URINALYSIS AUTO W/O SCOPE: CPT | Performed by: EMERGENCY MEDICINE

## 2020-03-03 PROCEDURE — 99284 EMERGENCY DEPT VISIT MOD MDM: CPT | Performed by: EMERGENCY MEDICINE

## 2020-03-03 PROCEDURE — 80053 COMPREHEN METABOLIC PANEL: CPT | Performed by: EMERGENCY MEDICINE

## 2020-03-03 PROCEDURE — 93010 ELECTROCARDIOGRAM REPORT: CPT | Performed by: INTERNAL MEDICINE

## 2020-03-03 PROCEDURE — 84484 ASSAY OF TROPONIN QUANT: CPT | Performed by: EMERGENCY MEDICINE

## 2020-03-03 RX ORDER — MECLIZINE HCL 12.5 MG 12.5 MG/1
50 TABLET ORAL ONCE
Status: COMPLETED | OUTPATIENT
Start: 2020-03-03 | End: 2020-03-03

## 2020-03-03 RX ADMIN — MECLIZINE 50 MG: 12.5 TABLET ORAL at 05:42

## 2020-03-03 ASSESSMENT — ENCOUNTER SYMPTOMS
SHORTNESS OF BREATH: 0
ARTHRALGIAS: 0
AGITATION: 0
FEVER: 0
PALPITATIONS: 0
NAUSEA: 1
CHILLS: 0
VOMITING: 0
LIGHT-HEADEDNESS: 1
DYSURIA: 0
CHEST TIGHTNESS: 0

## 2020-03-03 ASSESSMENT — MIFFLIN-ST. JEOR: SCORE: 1514.9

## 2020-03-03 NOTE — ED TRIAGE NOTES
Patient is here today with his mom complaining of dizziness.  Stated he started to feel dizzy at 0156 this AM.  States his BP was 121/102.  Blood sugar at 0200 was 187.  Patient states that he fells that he is sweaty and clammy.    Laquita Vance RN on 3/3/2020 at 3:43 AM

## 2020-03-03 NOTE — ED PROVIDER NOTES
History     Chief Complaint   Patient presents with     Dizziness     HPI  Rick Contreras is a 50 year old male who got up at about 156 this morning.  He had set his alarm to check his blood sugar.  When he got up he got very dizzy.  We discussed vertigo versus lightheadedness, and he feels it is more like lightheadedness.  He is better when he is lying down but it is not completely gone.  Sitting up while he is lying in the bed makes it worse.  Turning his head quickly does not.  He feels a little bit clammy.  He denies palpitations chest pain heaviness tightness or squeezing.  Not short of breath.  A little bit of nausea.  Feels he has been drinking plenty of liquids.  Blood sugars have been quite high lately, last hemoglobin A1c also quite elevated.  He is in the process of getting an insulin pump but does not have it yet.  Also recently diagnosed with hypothyroid and started on 100 mcg of levothyroid daily.  He also was not taking his other medications until a week ago and just started up on his lisinopril and Zocor again.  Not feeling ill otherwise.    Allergies:  Allergies   Allergen Reactions     Metformin Nausea and Other (See Comments)     Abdominal cramping       Problem List:    Patient Active Problem List    Diagnosis Date Noted     Uncontrolled type 2 diabetes mellitus without complication, with long-term current use of insulin (H) 05/31/2019     Priority: Medium     History of tobacco use disorder 03/12/2018     Priority: Medium     Rectal bleeding 10/25/2017     Priority: Medium     Back pain, chronic 12/29/2014     Priority: Medium     Herniated lumbar intervertebral disc 12/05/2014     Priority: Medium     Constipation 02/07/2014     Priority: Medium     Overview:   Chronic          Past Medical History:    Past Medical History:   Diagnosis Date     Other constipation      Personal history of other medical treatment (CODE)        Past Surgical History:    Past Surgical History:   Procedure  Laterality Date     EXTRACTION(S) DENTAL      No Comments Provided     HERNIORRHAPHY INGUINAL Bilateral 3/20/2018    Bilateral Inguinal Hernia Repair with ProlLite Mesh;  Surgeon: Immanuel Calzada MD     VASECTOMY             Family History:    Family History   Problem Relation Age of Onset     Diabetes Mother         Diabetes     Diabetes Father         Diabetes     Cancer Father         Cancer,Skin, throat     Anesthesia Reaction No family hx of      Clotting Disorder No family hx of      Cerebrovascular Disease No family hx of      Colon Cancer No family hx of        Social History:  Marital Status:   [4]  Social History     Tobacco Use     Smoking status: Former Smoker     Packs/day: 0.50     Years: 20.00     Pack years: 10.00     Types: Cigarettes     Last attempt to quit: 2015     Years since quittin.5     Smokeless tobacco: Never Used     Tobacco comment: Quit smoking: has chantix   Substance Use Topics     Alcohol use: Yes     Alcohol/week: 1.0 standard drinks     Comment: ocassionally     Drug use: No        Medications:    aspirin 81 MG EC tablet  insulin aspart (NOVOLOG FLEXPEN) 100 UNIT/ML pen  insulin detemir (LEVEMIR FLEXTOUCH) 100 UNIT/ML pen  levothyroxine (SYNTHROID/LEVOTHROID) 100 MCG tablet  lisinopril (PRINIVIL/ZESTRIL) 2.5 MG tablet  meloxicam (MOBIC) 7.5 MG tablet  polyethylene glycol (MIRALAX/GLYCOLAX) powder  simvastatin (ZOCOR) 20 MG tablet  traZODone (DESYREL) 50 MG tablet  blood glucose (NO BRAND SPECIFIED) lancets standard  blood glucose (NO BRAND SPECIFIED) test strip  blood glucose monitoring (NO BRAND SPECIFIED) meter device kit  Continuous Blood Gluc  (DEXCOM G6 ) LELIA  Continuous Blood Gluc Sensor (DEXCOM G6 SENSOR) MISC  Continuous Blood Gluc Transmit (DEXCOM G6 TRANSMITTER) MISC  insulin pen needle (RELION PEN NEEDLE) 31G X 8 MM miscellaneous  order for DME          Review of Systems   Constitutional: Negative for chills and fever.   HENT: Negative  "for congestion.    Eyes: Negative for visual disturbance.   Respiratory: Negative for chest tightness and shortness of breath.    Cardiovascular: Negative for chest pain and palpitations.   Gastrointestinal: Positive for nausea. Negative for vomiting.   Genitourinary: Negative for dysuria.   Musculoskeletal: Negative for arthralgias.   Skin: Negative for rash.   Neurological: Positive for light-headedness.   Psychiatric/Behavioral: Negative for agitation.       Physical Exam   BP: 119/78  Pulse: 76  Heart Rate: 77  Temp: 96.5  F (35.8  C)  Resp: 16  Height: 172.7 cm (5' 8\")  Weight: 68 kg (150 lb)  SpO2: 99 %  Lying Orthostatic BP: 107/77  Lying Orthostatic Pulse: 78 bpm  Sitting Orthostatic BP: 110/83  Sitting Orthostatic Pulse: 87 bpm  Standing Orthostatic BP: 110/85  Standing Orthostatic Pulse: 83 bpm      Physical Exam  Vitals signs and nursing note reviewed.   Constitutional:       Appearance: Normal appearance.   HENT:      Head: Normocephalic and atraumatic.      Mouth/Throat:      Mouth: Mucous membranes are moist.   Eyes:      Conjunctiva/sclera: Conjunctivae normal.   Cardiovascular:      Rate and Rhythm: Normal rate and regular rhythm.      Heart sounds: Normal heart sounds.   Pulmonary:      Effort: Pulmonary effort is normal.      Breath sounds: Normal breath sounds.   Abdominal:      Palpations: Abdomen is soft.   Skin:     General: Skin is warm and dry.   Neurological:      Mental Status: He is alert and oriented to person, place, and time.   Psychiatric:         Mood and Affect: Mood normal.         Behavior: Behavior normal.         ED Course        Procedures             Results for orders placed or performed during the hospital encounter of 03/03/20 (from the past 24 hour(s))   UA reflex to Microscopic   Result Value Ref Range    Color Urine Light Yellow     Appearance Urine Clear     Glucose Urine >1000 (A) NEG^Negative mg/dL    Bilirubin Urine Negative NEG^Negative    Ketones Urine Negative " NEG^Negative mg/dL    Specific Gravity Urine 1.027 1.003 - 1.035    Blood Urine Negative NEG^Negative    pH Urine 5.0 5.0 - 7.0 pH    Protein Albumin Urine Negative NEG^Negative mg/dL    Urobilinogen mg/dL Normal 0.0 - 2.0 mg/dL    Nitrite Urine Negative NEG^Negative    Leukocyte Esterase Urine Negative NEG^Negative    Source Midstream Urine    CBC with platelets differential   Result Value Ref Range    WBC 10.6 4.0 - 11.0 10e9/L    RBC Count 4.73 4.4 - 5.9 10e12/L    Hemoglobin 13.9 13.3 - 17.7 g/dL    Hematocrit 41.9 40.0 - 53.0 %    MCV 89 78 - 100 fl    MCH 29.4 26.5 - 33.0 pg    MCHC 33.2 31.5 - 36.5 g/dL    RDW 12.5 10.0 - 15.0 %    Platelet Count 355 150 - 450 10e9/L    Diff Method Automated Method     % Neutrophils 77.8 %    % Lymphocytes 14.1 %    % Monocytes 6.1 %    % Eosinophils 1.3 %    % Basophils 0.3 %    % Immature Granulocytes 0.4 %    Absolute Neutrophil 8.2 1.6 - 8.3 10e9/L    Absolute Lymphocytes 1.5 0.8 - 5.3 10e9/L    Absolute Monocytes 0.6 0.0 - 1.3 10e9/L    Absolute Eosinophils 0.1 0.0 - 0.7 10e9/L    Absolute Basophils 0.0 0.0 - 0.2 10e9/L    Abs Immature Granulocytes 0.0 0 - 0.4 10e9/L   Comprehensive metabolic panel   Result Value Ref Range    Sodium 135 134 - 144 mmol/L    Potassium 4.1 3.5 - 5.1 mmol/L    Chloride 102 98 - 107 mmol/L    Carbon Dioxide 26 21 - 31 mmol/L    Anion Gap 7 3 - 14 mmol/L    Glucose 299 (H) 70 - 105 mg/dL    Urea Nitrogen 16 7 - 25 mg/dL    Creatinine 1.06 0.70 - 1.30 mg/dL    GFR Estimate 74 >60 mL/min/[1.73_m2]    GFR Estimate If Black 89 >60 mL/min/[1.73_m2]    Calcium 8.8 8.6 - 10.3 mg/dL    Bilirubin Total 0.6 0.3 - 1.0 mg/dL    Albumin 4.0 3.5 - 5.7 g/dL    Protein Total 6.6 6.4 - 8.9 g/dL    Alkaline Phosphatase 47 34 - 104 U/L    ALT 17 7 - 52 U/L    AST 16 13 - 39 U/L   Troponin GH   Result Value Ref Range    Troponin 3.8 <34.0 pg/mL       Medications   meclizine (ANTIVERT) tablet 50 mg (50 mg Oral Given 3/3/20 0542)       Assessments & Plan (with  Medical Decision Making)     I have reviewed the nursing notes.    I have reviewed the findings, diagnosis, plan and need for follow up with the patient.  Labs are all reassuring.  EKG normal, troponin negative.  Does not appear dehydrated, electrolytes are normal.  Orthostatic vital signs also normal.  When all labs are back he said he is feeling better.  There is a little bit of dizziness but not like it was.  Discharged home at this time, follow-up in clinic if worse or not improving.    New Prescriptions    No medications on file       Final diagnoses:   Dizziness       3/3/2020   [facility]     Herbie Reveles MD  03/03/20 0545

## 2020-03-03 NOTE — ED NOTES
"Patient states that he is ready to discharge.  He stood up at the side of the bed and was able to tolerate it.  Stated \"it's not as bad as it was\".  He was able to stand upright without leaning to either side.   Administered 50 mg of Meclizine PO.  Dr. Reveles notified of patient's desires.    Laquita Vance RN on 3/3/2020 at 5:45 AM    "

## 2020-03-03 NOTE — ED AVS SNAPSHOT
[facility]  [address]  [place] [address]  Phone:  [phone]  Fax:  [phone]                                    Rick Contreras   MRN: [id number]    Department:  [facility]   Date of Visit:  3/3/2020           After Visit Summary Signature Page    I have received my discharge instructions, and my questions have been answered. I have discussed any challenges I see with this plan with the nurse or doctor.    ..........................................................................................................................................  Patient/Patient Representative Signature      ..........................................................................................................................................  Patient Representative Print Name and Relationship to Patient    ..................................................               ................................................  Date                                   Time    ..........................................................................................................................................  Reviewed by Signature/Title    ...................................................              ..............................................  Date                                               Time          22EPIC Rev 08/18

## 2020-03-04 LAB — INTERPRETATION ECG - MUSE: NORMAL

## 2020-03-06 ENCOUNTER — HOSPITAL ENCOUNTER (OUTPATIENT)
Dept: PHYSICAL THERAPY | Facility: OTHER | Age: 50
Setting detail: THERAPIES SERIES
End: 2020-03-06
Attending: CHIROPRACTOR
Payer: COMMERCIAL

## 2020-03-06 DIAGNOSIS — G89.29 CHRONIC BACK PAIN, UNSPECIFIED BACK LOCATION, UNSPECIFIED BACK PAIN LATERALITY: ICD-10-CM

## 2020-03-06 DIAGNOSIS — M54.9 CHRONIC BACK PAIN, UNSPECIFIED BACK LOCATION, UNSPECIFIED BACK PAIN LATERALITY: ICD-10-CM

## 2020-03-06 PROCEDURE — 97161 PT EVAL LOW COMPLEX 20 MIN: CPT | Mod: GP

## 2020-03-06 PROCEDURE — 97110 THERAPEUTIC EXERCISES: CPT | Mod: GP

## 2020-03-06 NOTE — PROGRESS NOTES
03/06/20 0800   General Information   Type of Visit Initial OP Ortho PT Evaluation   Start of Care Date 03/06/20   Referring Physician Dr. Reyes Ogden   Patient/Family Goals Statement To reduce his low back pain   Orders Evaluate and Treat   Date of Order 02/20/20   Certification Required? No   Medical Diagnosis M54.9, G89.29 (ICD-10-CM) - Chronic back pain, unspecified back location, unspecified back pain laterality   Surgical/Medical history reviewed Yes   Precautions/Limitations no known precautions/limitations   General Information Comments Patient is a 50 year old male referred to physical therapy with low back pain. He reports that this has been going on for quite some time. He reports that it has been going on since 1986. He has constant pain throughout the day. Lifting, bending forward, carrying, twisting/turning will always make his pain worse. Has trouble with pouring himself a glass of milk. Hot baths will help at times but this is nothing that is long lasting. He gets pain down the right leg that is pretty constant and iwll go down to the toes. Does have tingling in the right LE as well. Notes that he does have Diabetes but doesn't think the tingling is from this. Denies any joint surgeries. He did have hernia surgery in 2017 and reports he does not have any restrctions anymore. He takes care of his dad who has Alzheimers and diabetes. He is busy taking his family members to health care appointments.        Present No   Body Part(s)   Body Part(s) Lumbar Spine/SI   Presentation and Etiology   Pertinent history of current problem (include personal factors and/or comorbidities that impact the POC) Reports diabetes, and former tobacco use   Impairments A. Pain;B. Decreased WB tolerance;E. Decreased flexibility;D. Decreased ROM;F. Decreased strength and endurance;G. Impaired balance;H. Impaired gait;K. Numbness;L. Tingling;M. Locking or catching;N. Headaches   Functional Limitations  perform activities of daily living;perform desired leisure / sports activities   Symptom Location Low back and right LE   How/Where did it occur From insidious onset   Onset date of current episode/exacerbation 02/20/20   Chronicity Chronic   Pain rating (0-10 point scale) Best (/10);Worst (/10)   Best (/10) 5   Worst (/10) 7   Pain quality A. Sharp;C. Aching;E. Shooting;F. Stabbing   Frequency of pain/symptoms A. Constant   Pain/symptoms are: The same all the time   Pain/symptoms exacerbated by B. Walking;C. Lifting;D. Carrying;G. Certain positions;H. Overhead reach;I. Bending;K. Home tasks;L. Work tasks   Pain/symptoms eased by D. Nothing   Progression of symptoms since onset: Unchanged   Prior Level of Function   Prior Level of Function-Mobility Independent   Prior Level of Function-ADLs Independent   Current Level of Function   Patient role/employment history E. Unemployed   Living environment House/townhome   Home/community accessibility Denies issues getting around his home right now   Current equipment-Gait/Locomotion None   Current equipment-ADL None   Fall Risk Screen   Fall screen completed by PT   Have you fallen 2 or more times in the past year? No   Have you fallen and had an injury in the past year? No   Is patient a fall risk? No   System Outcome Measures   Outcome Measures Low Back Pain (see Oswestry and [place]rado)   Lumbar Spine/SI Objective Findings   Observation Patient is moving in his chair. He has some discomfort and is needing to reposition and stand up walk around to relief some discomfort   Integumentary No significant findings   Posture Minimal lumbar lordosis. Has forwward shoulder posture   Gait/Locomotion No significant deviations   Flexion ROM 66 degrees   Extension ROM 10 degrees   Right Side Bending ROM Uses upper body movement to complete. Has difficulty getting movement from lumbar spine   Left Side Bending ROM Uses upper body movement to complete. Has difficulty getting movement from  lumbar spine   Lumbar ROM Comment RotatioN: right min limited   Hip Screen Right hip ER: min limited, left hip ER: min limited   Hip Flexion (L2) Strength 4/5 on right, 5/5 on left   Hip Abduction Strength 5/5   Hip Adduction Strength 5/5   Knee Flexion Strength 4/5 bilaterally   Knee Extension (L3) Strength 4/5 bilaterally   Ankle Dorsiflexion (L4) Strength 5/5   Hamstring Flexibility Min limited bilaterally   Hip Flexor Flexibility Min limited on right side   Quadricep Flexibility Min limited bilaterally   Piriformis Flexibility Mod limited bilaterally secondary to pain   SLR Increased low back pain   Slump Test Increased symptoms on right LE   Lumbar/SI Special Tests Comments Leg length: negative, trendelenburg: + right   Segmental Mobility Difficult to fully assess today as patient had increased pain with light pressure/palpation   Sensation Testing Intact to light touch   Neurological Testing Comments Increase symptoms with right sciatic tensioning   Palpation Discomfort noted with thoracic and lumbar paraspinals, gluteal muscles on right Le   Planned Therapy Interventions   Planned Therapy Interventions balance training;gait training;joint mobilization;manual therapy;neuromuscular re-education;ROM;strengthening;stretching   Planned Modality Interventions   Planned Modality Interventions Cryotherapy;Hot packs;Ultrasound;Traction;Electrical stimulation   Clinical Impression   Criteria for Skilled Therapeutic Interventions Met yes, treatment indicated   PT Diagnosis Impaired mobility, decreased functional strength and endurance, deconditioned, core weakness   Influenced by the following impairments weakness, stiffness   Functional limitations due to impairments lifting, carrying, bending   Clinical Presentation Stable/Uncomplicated   Clinical Presentation Rationale minimal reported comorbidities, clinical judgement   Clinical Decision Making (Complexity) Low complexity   Therapy Frequency 2 times/Week   Predicted  Duration of Therapy Intervention (days/wks) 12 weeks   Risk & Benefits of therapy have been explained Yes   Patient, Family & other staff in agreement with plan of care Yes   Clinical Impression Comments Signs and symptoms consistent with low back pain that has been chronic. Notes that he has had this pain for quite some time from an insident stemming back to 1986. Today, he is demonstrating weakness in his core, back and Le's musculatur that is more pronounced on his right side. Does have tingling and discomfort down his right LE which is elicited with sciatic nerve tensioning. Has discomfort with light palpation to his lumbar and thoacic paraspinals and his gluteal muscles on right side. He is appropriate for physical therapy in order to reduce his pain and improve his strength, endurance and mobility   Education Assessment   Barriers to Learning No barriers   ORTHO GOALS   PT Ortho Eval Goals 1;2;3;4;5   Ortho Goal 1   Goal Identifier HEP   Goal Description Patient will demonstrate compliance and independence with his HEP in order to improve his ability to self manage his symtpoms   Target Date 04/03/20   Ortho Goal 2   Goal Identifier ADL's   Goal Description Patient will be able to complete all dressing, bathing, and grooming activities with no increase in his low back pain in order to improve his ability to complete ADL's   Target Date 04/03/20   Ortho Goal 3   Goal Identifier Lifting   Goal Description Patient will be able to lift 10 lbs off of the ground with minimal to no back pain in order to improve his function around his home   Target Date 05/01/20   Ortho Goal 4   Goal Identifier Sitting   Goal Description Patient will be able to sit for longer than 60 minutes with minimal to no back pain in order to improve his efficiency and safety with traveling in a vehicle   Target Date 05/01/20   Ortho Goal 5   Goal Identifier Housework   Goal Description Patient will be able to complete all household chores with  minimal to no pain in his low back in order to improve his function at the home   Target Date 05/01/20   Total Evaluation Time   PT Eval, Low Complexity Minutes (22578) 45

## 2020-03-11 ENCOUNTER — TELEPHONE (OUTPATIENT)
Dept: PEDIATRICS | Facility: OTHER | Age: 50
End: 2020-03-11

## 2020-03-11 NOTE — TELEPHONE ENCOUNTER
Spoke to pt.  Pt states that he started taking Lisinopril and Levothyroxine in Feb.  He has been in the ED for dizziness over the last week and is wondering if this could be causing his dizziness.  He would like to know what to do.  Denice Jaime LPN ....................  3/11/2020   2:30 PM

## 2020-03-11 NOTE — TELEPHONE ENCOUNTER
Could be many things, he should be seen.    Signed, Adebayo Dubois MD, FAAP, FACP  Internal Medicine & Pediatrics

## 2020-03-11 NOTE — TELEPHONE ENCOUNTER
Patient notified. He will call back and schedule.   Malinda Preston LPN........3/11/2020  2:49 PM

## 2020-03-11 NOTE — TELEPHONE ENCOUNTER
States he thinks one of his medications is making him dizzy. He went to ER for this previously. Would like to discuss medications

## 2020-03-12 ENCOUNTER — HOSPITAL ENCOUNTER (OUTPATIENT)
Dept: PHYSICAL THERAPY | Facility: OTHER | Age: 50
Setting detail: THERAPIES SERIES
End: 2020-03-12
Attending: CHIROPRACTOR
Payer: COMMERCIAL

## 2020-03-12 PROCEDURE — 97110 THERAPEUTIC EXERCISES: CPT | Mod: GP

## 2020-03-12 PROCEDURE — 97140 MANUAL THERAPY 1/> REGIONS: CPT | Mod: GP

## 2020-03-17 ENCOUNTER — HOSPITAL ENCOUNTER (OUTPATIENT)
Dept: PHYSICAL THERAPY | Facility: OTHER | Age: 50
Setting detail: THERAPIES SERIES
End: 2020-03-17
Attending: CHIROPRACTOR
Payer: COMMERCIAL

## 2020-03-17 PROCEDURE — 97110 THERAPEUTIC EXERCISES: CPT | Mod: GP

## 2020-03-19 ENCOUNTER — OFFICE VISIT (OUTPATIENT)
Dept: PEDIATRICS | Facility: OTHER | Age: 50
End: 2020-03-19
Attending: INTERNAL MEDICINE
Payer: COMMERCIAL

## 2020-03-19 VITALS
RESPIRATION RATE: 16 BRPM | TEMPERATURE: 96.2 F | DIASTOLIC BLOOD PRESSURE: 70 MMHG | HEIGHT: 68 IN | WEIGHT: 147 LBS | SYSTOLIC BLOOD PRESSURE: 116 MMHG | HEART RATE: 78 BPM | OXYGEN SATURATION: 98 % | BODY MASS INDEX: 22.28 KG/M2

## 2020-03-19 DIAGNOSIS — R42 DIZZINESS: Primary | ICD-10-CM

## 2020-03-19 PROCEDURE — G0463 HOSPITAL OUTPT CLINIC VISIT: HCPCS

## 2020-03-19 PROCEDURE — 99213 OFFICE O/P EST LOW 20 MIN: CPT | Performed by: INTERNAL MEDICINE

## 2020-03-19 ASSESSMENT — PAIN SCALES - GENERAL: PAINLEVEL: SEVERE PAIN (7)

## 2020-03-19 ASSESSMENT — MIFFLIN-ST. JEOR: SCORE: 1501.29

## 2020-03-19 NOTE — NURSING NOTE
Patient presents to clinic for ongoing dizzy spells.  Patient states that he stopped taking the simvastatin 4 days ago and no dizziness since.  Denice Jaime LPN ....................  3/19/2020   7:41 AM    No LMP for male patient.  Medication Reconciliation: complete    Denice Jaime LPN  3/19/2020 7:41 AM      Previous A1C is not at goal of <8  Lab Results   Component Value Date    A1C 9.4 02/13/2020    A1C 10.3 05/31/2019    A1C 9.3 11/01/2018    A1C 7.1 03/12/2018     Urine microalbumin:creatine: Due  Foot exam 2/13/20  Eye exam 12/20/18    Tobacco User No  Patient is on a daily aspirin  Patient is on a Statin.  Blood pressure today of:     BP Readings from Last 1 Encounters:   03/19/20 116/70      is at the goal of <139/89 for diabetics.    Denice Jaime LPN on 3/19/2020 at 7:41 AM

## 2020-03-19 NOTE — PROGRESS NOTES
"Subjective  Rick Contreras is a 50 year old male who presents for dizzy spells.  He recently developed episodes of dizziness and presented to the [facility].  Had a thorough evaluation with no etiology discovered.  He decided it was secondary to simvastatin and discontinued taking it.  His symptoms have resolved.  He had been taking simvastatin only several weeks before doing so.  No hypoglycemia.    Problem List/PMH: reviewed in EMR, and made relevant updates today.  Medications: reviewed in EMR, and made relevant updates today.  Allergies: reviewed in EMR, and made relevant updates today.    Social Hx:  Social History     Tobacco Use     Smoking status: Former Smoker     Packs/day: 0.50     Years: 20.00     Pack years: 10.00     Types: Cigarettes     Last attempt to quit: 2015     Years since quittin.5     Smokeless tobacco: Never Used     Tobacco comment: Quit smoking: has chantix   Substance Use Topics     Alcohol use: Yes     Alcohol/week: 1.0 standard drinks     Comment: ocassionally     Drug use: No     Social History     Social History Narrative    Unemployed     I reviewed social history and made relevant updates today.    Family Hx:   Family History   Problem Relation Age of Onset     Diabetes Mother         Diabetes     Diabetes Father         Diabetes     Cancer Father         Cancer,Skin, throat     Anesthesia Reaction No family hx of      Clotting Disorder No family hx of      Cerebrovascular Disease No family hx of      Colon Cancer No family hx of        Objective  Vitals: reviewed in EMR.  /70 (BP Location: Right arm, Patient Position: Sitting, Cuff Size: Adult Large)   Pulse 78   Temp 96.2  F (35.7  C) (Tympanic)   Resp 16   Ht 1.727 m (5' 8\")   Wt 66.7 kg (147 lb)   SpO2 98%   BMI 22.35 kg/m      Gen: Pleasant male, NAD.  HEENT: MMM  Neck: Supple  Pulm: Breathing easily  Neuro: Grossly intact  Skin: No concerning " lesions.  Psychiatric: Normal affect and insight. Does not appear anxious or depressed.    Labs:  Glucose   Date Value Ref Range Status   03/03/2020 299 (H) 70 - 105 mg/dL Final   02/13/2020 321 (H) 70 - 105 mg/dL Final     Hemoglobin A1C   Date Value Ref Range Status   02/13/2020 9.4 (H) 4.0 - 6.0 % Final   05/31/2019 10.3 (H) 4.0 - 6.0 % Final     Creatinine   Date Value Ref Range Status   03/03/2020 1.06 0.70 - 1.30 mg/dL Final   02/13/2020 1.08 0.70 - 1.30 mg/dL Final     LDL Cholesterol Calculated   Date Value Ref Range Status   05/31/2019 107 (H) <100 mg/dL Final     Comment:     Above desirable:  100-129 mg/dl  Borderline High:  130-159 mg/dL  High:             160-189 mg/dL  Very high:       >189 mg/dl                 Assessment    ICD-10-CM    1. Dizziness  R42    2. Uncontrolled type 2 diabetes mellitus without complication, with long-term current use of insulin (H)  E11.65     Z79.4      I think it is plausible that he experienced an adverse reaction to simvastatin.  I recommend a retrial after symptoms have resolved.  We should have a low threshold to try a similar agent if symptoms recur.    Plan   -- Expected clinical course discussed   -- Medications and their side effects discussed  Patient Instructions    -- Stop simvastatin for now   -- After several weeks, retrial simvastatin   -- If you get a return of dizziness, again stop simvastatin   -- Then call Dr. Dubois's office for next steps      Return if symptoms worsen or fail to improve.    Signed, Adebayo Dubois MD, FAAP, FACP  Internal Medicine & Pediatrics

## 2020-03-19 NOTE — PATIENT INSTRUCTIONS
-- Stop simvastatin for now   -- After several weeks, retrial simvastatin   -- If you get a return of dizziness, again stop simvastatin   -- Then call Dr. Dubois's office for next steps

## 2020-06-19 ENCOUNTER — OFFICE VISIT (OUTPATIENT)
Dept: FAMILY MEDICINE | Facility: OTHER | Age: 50
End: 2020-06-19
Attending: PHYSICIAN ASSISTANT
Payer: COMMERCIAL

## 2020-06-19 VITALS
RESPIRATION RATE: 14 BRPM | HEART RATE: 100 BPM | TEMPERATURE: 98.1 F | SYSTOLIC BLOOD PRESSURE: 126 MMHG | HEIGHT: 68 IN | DIASTOLIC BLOOD PRESSURE: 68 MMHG | WEIGHT: 140.6 LBS | BODY MASS INDEX: 21.31 KG/M2

## 2020-06-19 DIAGNOSIS — R39.89 URINARY PROBLEM: ICD-10-CM

## 2020-06-19 DIAGNOSIS — N30.01 ACUTE CYSTITIS WITH HEMATURIA: Primary | ICD-10-CM

## 2020-06-19 LAB
ALBUMIN UR-MCNC: 100 MG/DL
APPEARANCE UR: ABNORMAL
BACTERIA #/AREA URNS HPF: ABNORMAL /HPF
BILIRUB UR QL STRIP: NEGATIVE
COLOR UR AUTO: YELLOW
GLUCOSE UR STRIP-MCNC: >1000 MG/DL
HGB UR QL STRIP: ABNORMAL
KETONES UR STRIP-MCNC: NEGATIVE MG/DL
LEUKOCYTE ESTERASE UR QL STRIP: ABNORMAL
NITRATE UR QL: NEGATIVE
PH UR STRIP: 5.5 PH (ref 5–7)
RBC #/AREA URNS AUTO: >182 /HPF (ref 0–2)
SOURCE: ABNORMAL
SP GR UR STRIP: 1.02 (ref 1–1.03)
UROBILINOGEN UR STRIP-MCNC: NORMAL MG/DL (ref 0–2)
WBC #/AREA URNS AUTO: >182 /HPF (ref 0–5)

## 2020-06-19 PROCEDURE — 87086 URINE CULTURE/COLONY COUNT: CPT | Mod: ZL | Performed by: PHYSICIAN ASSISTANT

## 2020-06-19 PROCEDURE — 81001 URINALYSIS AUTO W/SCOPE: CPT | Mod: ZL | Performed by: PHYSICIAN ASSISTANT

## 2020-06-19 PROCEDURE — G0463 HOSPITAL OUTPT CLINIC VISIT: HCPCS

## 2020-06-19 PROCEDURE — 87088 URINE BACTERIA CULTURE: CPT | Mod: ZL | Performed by: PHYSICIAN ASSISTANT

## 2020-06-19 PROCEDURE — 99213 OFFICE O/P EST LOW 20 MIN: CPT | Performed by: PHYSICIAN ASSISTANT

## 2020-06-19 RX ORDER — NITROFURANTOIN 25; 75 MG/1; MG/1
100 CAPSULE ORAL 2 TIMES DAILY
Qty: 14 CAPSULE | Refills: 0 | Status: SHIPPED | OUTPATIENT
Start: 2020-06-19 | End: 2020-11-27

## 2020-06-19 ASSESSMENT — MIFFLIN-ST. JEOR: SCORE: 1472.26

## 2020-06-19 NOTE — PROGRESS NOTES
"Nursing Notes:   Divina Zapata LPN  2020  1:07 PM  Addendum  Patient presents to clinic with \"blood in urine\" that he noticed this morning. He did have some pain with urination.  Divina Zapata LPN ....................  2020   12:58 PM      SUBJECTIVE:     HPI  Rick Contreras is a 50 year old male who presents to clinic today for evaluation of possible UTI. States this morning he noticed dysuria with associated blood in his urine. Has never had a UTI before. No fever/chills, urinary frequency or urgency, flank pain, abdominal pain, nausea, vomiting, diarrhea, constipation, blood in stool. Has not tried anything for symptomatic management. No STD concerns.     Review of Systems   Per HPI.     PAST MEDICAL HISTORY:   Past Medical History:   Diagnosis Date     Other constipation     No Comments Provided     Personal history of other medical treatment (CODE)     No Comments Provided       PAST SURGICAL HISTORY:   Past Surgical History:   Procedure Laterality Date     EXTRACTION(S) DENTAL      No Comments Provided     HERNIORRHAPHY INGUINAL Bilateral 3/20/2018    Bilateral Inguinal Hernia Repair with ProlLite Mesh;  Surgeon: mImanuel Calzada MD     VASECTOMY             FAMILY HISTORY:   Family History   Problem Relation Age of Onset     Diabetes Mother         Diabetes     Diabetes Father         Diabetes     Cancer Father         Cancer,Skin, throat     Anesthesia Reaction No family hx of      Clotting Disorder No family hx of      Cerebrovascular Disease No family hx of      Colon Cancer No family hx of        SOCIAL HISTORY:   Social History     Tobacco Use     Smoking status: Former Smoker     Packs/day: 0.50     Years: 20.00     Pack years: 10.00     Types: Cigarettes     Last attempt to quit: 2015     Years since quittin.8     Smokeless tobacco: Never Used     Tobacco comment: Quit smoking: has chantix   Substance Use Topics     Alcohol use: Yes     Alcohol/week: 1.0 standard drinks " "    Comment: ocassionally        Allergies   Allergen Reactions     Metformin Nausea and Other (See Comments)     Abdominal cramping     Current Outpatient Medications   Medication     aspirin 81 MG EC tablet     blood glucose (NO BRAND SPECIFIED) lancets standard     blood glucose (NO BRAND SPECIFIED) test strip     blood glucose monitoring (NO BRAND SPECIFIED) meter device kit     Continuous Blood Gluc  (DEXCOM G6 ) LELIA     Continuous Blood Gluc Sensor (DEXCOM G6 SENSOR) MISC     Continuous Blood Gluc Transmit (DEXCOM G6 TRANSMITTER) MISC     insulin aspart (NOVOLOG FLEXPEN) 100 UNIT/ML pen     insulin detemir (LEVEMIR FLEXTOUCH) 100 UNIT/ML pen     insulin pen needle (RELION PEN NEEDLE) 31G X 8 MM miscellaneous     levothyroxine (SYNTHROID/LEVOTHROID) 100 MCG tablet     lisinopril (PRINIVIL/ZESTRIL) 2.5 MG tablet     meloxicam (MOBIC) 7.5 MG tablet     nitroFURantoin macrocrystal-monohydrate (MACROBID) 100 MG capsule     order for DME     polyethylene glycol (MIRALAX/GLYCOLAX) powder     traZODone (DESYREL) 50 MG tablet     No current facility-administered medications for this visit.          OBJECTIVE:     /68   Pulse 100   Temp 98.1  F (36.7  C)   Resp 14   Ht 1.727 m (5' 8\")   Wt 63.8 kg (140 lb 9.6 oz)   BMI 21.38 kg/m    Body mass index is 21.38 kg/m .  Physical Exam  General: Pleasant, in no apparent distress.   Respiratory: Lungs are resonant and clear to auscultation bilaterally. No wheezes, crackles, or rhonchi.  Abdomen: Abdomen is non-distended and without bulging flanks, prominent venous markings, or ecchymosis. Active bowel sounds heard in all four quadrants. No tenderness to palpation in all four quadrants. No rebound tenderness or guarding. No palpable masses noted. No hepatosplenomegaly. No CVA tenderness.  Psych: Appropriate mood and affect.      Results for orders placed or performed in visit on 06/19/20   UA reflex to Microscopic and Culture     Status: Abnormal    " Specimen: Midstream Urine   Result Value Ref Range    Color Urine Yellow     Appearance Urine Cloudy     Glucose Urine >1000 (A) NEG^Negative mg/dL    Bilirubin Urine Negative NEG^Negative    Ketones Urine Negative NEG^Negative mg/dL    Specific Gravity Urine 1.023 1.003 - 1.035    Blood Urine Large (A) NEG^Negative    pH Urine 5.5 5.0 - 7.0 pH    Protein Albumin Urine 100 (A) NEG^Negative mg/dL    Urobilinogen mg/dL Normal 0.0 - 2.0 mg/dL    Nitrite Urine Negative NEG^Negative    Leukocyte Esterase Urine Large (A) NEG^Negative    Source Midstream Urine     RBC Urine >182 (H) 0 - 2 /HPF    WBC Urine >182 (H) 0 - 5 /HPF    Bacteria Urine Few (A) NEG^Negative /HPF         ASSESSMENT/PLAN:     1. Acute cystitis with hematuria    2. Urinary problem      Discussed with patient that symptoms and UA are suggestive of probable UTI. Will treat with Macrobid. Educated on medication, use, and side effects. Encouraged use of probiotic or yogurt while taking antibiotic. Urine culture pending. Will notify if treatment changes indicated. Call or return to the clinic if symptoms persist, worsen, or new symptoms arise.     Rosalia Henry PA-C  [facility]

## 2020-06-19 NOTE — NURSING NOTE
"Patient presents to clinic with \"blood in urine\" that he noticed this morning. He did have some pain with urination.  Divina Zapata, KIRBY ....................  6/19/2020   12:58 PM      "

## 2020-06-21 LAB
BACTERIA SPEC CULT: ABNORMAL
SPECIMEN SOURCE: ABNORMAL

## 2020-07-23 DIAGNOSIS — E03.9 ACQUIRED HYPOTHYROIDISM: ICD-10-CM

## 2020-07-23 NOTE — LETTER
July 24, 2020      Rick Contreras     [place] [address]        Dear Rick,     A refill request was received from your pharmacy for Levothyroxine.    Additional refills require a lab only appointment for recheck labs.    Please call [phone] to schedule appointment.        Sincerely,      Refill Nurse

## 2020-07-24 DIAGNOSIS — E78.5 HYPERLIPIDEMIA LDL GOAL <100: Primary | ICD-10-CM

## 2020-07-24 RX ORDER — LEVOTHYROXINE SODIUM 100 UG/1
TABLET ORAL
Qty: 90 TABLET | Refills: 0 | Status: SHIPPED | OUTPATIENT
Start: 2020-07-24 | End: 2020-10-20

## 2020-07-24 NOTE — PROGRESS NOTES
Outpatient Physical Therapy Discharge Note     Patient: Rick Contreras  : 1970    Beginning/End Dates of Reporting Period:  3/6/2020 to 2020    Referring Provider: Dr. Reyes Ogden    Therapy Diagnosis: Impaired mobility, decreased functional strength and endurance, deconditioned, core weakness     Client Self Report: Patient reports that he is doing ok today. felt fine after last visit. Reports pain is still about 7/10    Objective Measurements:  Objective Measure: Subjective pain rating  Details: 7/10  Objective Measure: 6 minute walk test results:   Details: Pre HR: 81 bpm, Pre BP: 118/70, Pre O2 sat: 99%, Post HR: 95 BPM, Post BP: 127/68, Post O2: 98%, Feet walked: 1099 feet     Goals:  Goal Identifier HEP   Goal Description Patient will demonstrate compliance and independence with his HEP in order to improve his ability to self manage his symtpoms   Target Date 20   Date Met      Progress:     Goal Identifier ADL's   Goal Description Patient will be able to complete all dressing, bathing, and grooming activities with no increase in his low back pain in order to improve his ability to complete ADL's   Target Date 20   Date Met      Progress:     Goal Identifier Lifting   Goal Description Patient will be able to lift 10 lbs off of the ground with minimal to no back pain in order to improve his function around his home   Target Date 20   Date Met      Progress:     Goal Identifier Sitting   Goal Description Patient will be able to sit for longer than 60 minutes with minimal to no back pain in order to improve his efficiency and safety with traveling in a vehicle   Target Date 20   Date Met      Progress:     Goal Identifier Housework   Goal Description Patient will be able to complete all household chores with minimal to no pain in his low back in order to improve his function at the home   Target Date 20   Date Met      Progress:     Progress Toward Goals:   Not assessed  this period. Discharge is unplanned. Cancelled appointments due to COVID-19    Plan:  Discharge from therapy.    Discharge:    Reason for Discharge: Cancelled appointments due to COVID. No further appointments were scheduled    Equipment Issued: none    Discharge Plan: Follow up with physician as needed.

## 2020-07-24 NOTE — TELEPHONE ENCOUNTER
Routing refill request to provider for review/approval because:  Labs out of range:  TSH noted as elevated on 2/13/2020  Patient was to recheck in 2 months   No follow up done    LOV: 3/19/2020    Letter sent  Rosa Pickard RN on 7/24/2020 at 10:33 AM

## 2020-07-27 RX ORDER — SIMVASTATIN 20 MG
20 TABLET ORAL AT BEDTIME
Qty: 90 TABLET | Refills: 3 | OUTPATIENT
Start: 2020-07-27

## 2020-07-27 NOTE — TELEPHONE ENCOUNTER
"Simvastatin noted as stopped as of 3/19/2020  Per LOV on 3/19/2020  \"  Patient Instructions    -- Stop simvastatin for now   -- After several weeks, retrial simvastatin   -- If you get a return of dizziness, again stop simvastatin   -- Then call Dr. Dubois's office for next steps      Called and spoke with patient and patient states that he did stop Simvastatin but has not gone back on it states di not retry and does not need refill    Rosa Pickard RN on 7/27/2020 at 10:37 AM    "

## 2020-10-20 DIAGNOSIS — E03.9 ACQUIRED HYPOTHYROIDISM: ICD-10-CM

## 2020-10-20 RX ORDER — LEVOTHYROXINE SODIUM 100 UG/1
TABLET ORAL
Qty: 90 TABLET | Refills: 0 | Status: SHIPPED | OUTPATIENT
Start: 2020-10-20 | End: 2021-01-27

## 2020-10-20 NOTE — TELEPHONE ENCOUNTER
Routing refill request to provider for review/approval because:  Labs not current:  TSH, HGBA1C,   Patient needs to be seen because:  Diabetic follow up.    LOV; 3/19/2020      Called patient and informed due for labs and diabetic follow up and patient states he will call back and schedule something as soon as he can. States he does not have his car at this time.    Informed patient will route request to Dr. Dubois for review and consideration.    Rosa Pickard RN on 10/20/2020 at 12:14 PM

## 2020-11-27 ENCOUNTER — OFFICE VISIT (OUTPATIENT)
Dept: PEDIATRICS | Facility: OTHER | Age: 50
End: 2020-11-27
Attending: INTERNAL MEDICINE
Payer: COMMERCIAL

## 2020-11-27 VITALS
SYSTOLIC BLOOD PRESSURE: 120 MMHG | RESPIRATION RATE: 16 BRPM | HEART RATE: 81 BPM | TEMPERATURE: 97.8 F | BODY MASS INDEX: 22.23 KG/M2 | OXYGEN SATURATION: 99 % | DIASTOLIC BLOOD PRESSURE: 84 MMHG | WEIGHT: 146.2 LBS

## 2020-11-27 DIAGNOSIS — E11.65 TYPE 2 DIABETES MELLITUS WITH HYPERGLYCEMIA, WITH LONG-TERM CURRENT USE OF INSULIN (H): Primary | ICD-10-CM

## 2020-11-27 DIAGNOSIS — Z79.4 TYPE 2 DIABETES MELLITUS WITH HYPERGLYCEMIA, WITH LONG-TERM CURRENT USE OF INSULIN (H): Primary | ICD-10-CM

## 2020-11-27 DIAGNOSIS — B35.3 TINEA PEDIS OF RIGHT FOOT: ICD-10-CM

## 2020-11-27 DIAGNOSIS — Z11.59 NEED FOR HEPATITIS C SCREENING TEST: ICD-10-CM

## 2020-11-27 DIAGNOSIS — E03.9 ACQUIRED HYPOTHYROIDISM: ICD-10-CM

## 2020-11-27 DIAGNOSIS — Z23 NEED FOR PROPHYLACTIC VACCINATION AND INOCULATION AGAINST INFLUENZA: ICD-10-CM

## 2020-11-27 DIAGNOSIS — R79.89 ELEVATED TSH: ICD-10-CM

## 2020-11-27 LAB
ANION GAP SERPL CALCULATED.3IONS-SCNC: 6 MMOL/L (ref 3–14)
BUN SERPL-MCNC: 16 MG/DL (ref 7–25)
CALCIUM SERPL-MCNC: 9.1 MG/DL (ref 8.6–10.3)
CHLORIDE SERPL-SCNC: 101 MMOL/L (ref 98–107)
CHOLEST SERPL-MCNC: 158 MG/DL
CO2 SERPL-SCNC: 29 MMOL/L (ref 21–31)
CREAT SERPL-MCNC: 1.25 MG/DL (ref 0.7–1.3)
GFR SERPL CREATININE-BSD FRML MDRD: 61 ML/MIN/{1.73_M2}
GLUCOSE SERPL-MCNC: 325 MG/DL (ref 70–105)
HBA1C MFR BLD: 10.1 % (ref 4–6)
HDLC SERPL-MCNC: 48 MG/DL (ref 23–92)
LDLC SERPL CALC-MCNC: 74 MG/DL
NONHDLC SERPL-MCNC: 110 MG/DL
POTASSIUM SERPL-SCNC: 4.2 MMOL/L (ref 3.5–5.1)
SODIUM SERPL-SCNC: 136 MMOL/L (ref 134–144)
T4 FREE SERPL-MCNC: 0.83 NG/DL (ref 0.6–1.6)
TRIGL SERPL-MCNC: 178 MG/DL
TSH SERPL DL<=0.05 MIU/L-ACNC: 4.45 IU/ML (ref 0.34–5.6)

## 2020-11-27 PROCEDURE — 90471 IMMUNIZATION ADMIN: CPT

## 2020-11-27 PROCEDURE — 86803 HEPATITIS C AB TEST: CPT | Mod: ZL | Performed by: INTERNAL MEDICINE

## 2020-11-27 PROCEDURE — 80061 LIPID PANEL: CPT | Mod: ZL | Performed by: INTERNAL MEDICINE

## 2020-11-27 PROCEDURE — G0463 HOSPITAL OUTPT CLINIC VISIT: HCPCS

## 2020-11-27 PROCEDURE — 84439 ASSAY OF FREE THYROXINE: CPT | Mod: ZL | Performed by: INTERNAL MEDICINE

## 2020-11-27 PROCEDURE — G0463 HOSPITAL OUTPT CLINIC VISIT: HCPCS | Mod: 25

## 2020-11-27 PROCEDURE — 83036 HEMOGLOBIN GLYCOSYLATED A1C: CPT | Mod: ZL | Performed by: INTERNAL MEDICINE

## 2020-11-27 PROCEDURE — 99214 OFFICE O/P EST MOD 30 MIN: CPT | Performed by: INTERNAL MEDICINE

## 2020-11-27 PROCEDURE — 80048 BASIC METABOLIC PNL TOTAL CA: CPT | Mod: ZL | Performed by: INTERNAL MEDICINE

## 2020-11-27 PROCEDURE — 84443 ASSAY THYROID STIM HORMONE: CPT | Mod: ZL | Performed by: INTERNAL MEDICINE

## 2020-11-27 PROCEDURE — 36415 COLL VENOUS BLD VENIPUNCTURE: CPT | Mod: ZL | Performed by: INTERNAL MEDICINE

## 2020-11-27 RX ORDER — BUPROPION HYDROCHLORIDE 100 MG/1
100 TABLET, EXTENDED RELEASE ORAL DAILY
COMMUNITY
Start: 2020-11-19 | End: 2022-02-03

## 2020-11-27 RX ORDER — SIMVASTATIN 20 MG
20 TABLET ORAL AT BEDTIME
Qty: 90 TABLET | Refills: 3 | Status: SHIPPED | OUTPATIENT
Start: 2020-11-27 | End: 2021-11-16

## 2020-11-27 RX ORDER — CLOTRIMAZOLE 1 %
CREAM (GRAM) TOPICAL 2 TIMES DAILY
Qty: 60 G | Refills: 3 | Status: SHIPPED | OUTPATIENT
Start: 2020-11-27 | End: 2022-02-03

## 2020-11-27 ASSESSMENT — PAIN SCALES - GENERAL: PAINLEVEL: SEVERE PAIN (7)

## 2020-11-27 NOTE — LETTER
November 30, 2020      Rick Contreras  [address]   [place] [address]        Dear ,    We are writing to inform you of your test results.    A1c is too high. Keep working hard.  Next step is to follow-up with Michelle.  Thyroid looks great.  Kidney function is good.  Cholesterol is good.  Great news, you do not have hepatitis C.    Signed, Adebayo Dubois MD, FAAP, FACP  Internal Medicine & Pediatrics      Resulted Orders   T4, Free   Result Value Ref Range    T4 Free 0.83 0.60 - 1.60 ng/dL   Thyrotropin GH   Result Value Ref Range    Thyrotropin 4.45 0.34 - 5.60 IU/mL   Hepatitis C Screen Reflex to HCV RNA Quant and Genotype   Result Value Ref Range    Hepatitis C Antibody Nonreactive NR^Nonreactive      Comment:      Assay performance characteristics have not been established for newborns,   infants, and children     Hemoglobin A1c   Result Value Ref Range    Hemoglobin A1C 10.1 (H) 4.0 - 6.0 %   Basic Metabolic Panel   Result Value Ref Range    Sodium 136 134 - 144 mmol/L    Potassium 4.2 3.5 - 5.1 mmol/L    Chloride 101 98 - 107 mmol/L    Carbon Dioxide 29 21 - 31 mmol/L    Anion Gap 6 3 - 14 mmol/L    Glucose 325 (H) 70 - 105 mg/dL    Urea Nitrogen 16 7 - 25 mg/dL    Creatinine 1.25 0.70 - 1.30 mg/dL    GFR Estimate 61 >60 mL/min/[1.73_m2]    GFR Estimate If Black 74 >60 mL/min/[1.73_m2]    Calcium 9.1 8.6 - 10.3 mg/dL   Lipid Profile   Result Value Ref Range    Cholesterol 158 <200 mg/dL    Triglycerides 178 (H) <150 mg/dL      Comment:      Borderline high:  150-199 mg/dl  High:             200-499 mg/dl  Very high:       >499 mg/dl      HDL Cholesterol 48 23 - 92 mg/dL    LDL Cholesterol Calculated 74 <100 mg/dL      Comment:      Desirable:       <100 mg/dl    Non HDL Cholesterol 110 <130 mg/dL       If you have any questions or concerns, please call the clinic at the number listed above.       Sincerely,        Adebayo Dubois MD

## 2020-11-27 NOTE — PROGRESS NOTES
Previous A1C is not at goal of <8  Lab Results   Component Value Date    A1C 9.4 02/13/2020    A1C 10.3 05/31/2019    A1C 9.3 11/01/2018    A1C 7.1 03/12/2018     Urine microalbumin:creatine: N/A  Foot exam 2/13/2020  Eye exam DUE     Tobacco User No   Patient is on a daily aspirin  Patient is not on a Statin.  Blood pressure today of:     BP Readings from Last 1 Encounters:   11/27/20 120/84      is at the goal of <139/89 for diabetics.    Marilyn Mesa MA on 11/27/2020 at 3:00 PM

## 2020-11-27 NOTE — NURSING NOTE
"Chief Complaint   Patient presents with     Diabetes     recheck      Thyroid Problem     recheck      Patient is here for a recheck on diabetes and thyroid    Initial /84 (BP Location: Right arm, Patient Position: Sitting, Cuff Size: Adult Regular)   Pulse 81   Temp 97.8  F (36.6  C) (Tympanic)   Resp 16   Wt 66.3 kg (146 lb 3.2 oz)   SpO2 99%   BMI 22.23 kg/m   Estimated body mass index is 22.23 kg/m  as calculated from the following:    Height as of 6/19/20: 1.727 m (5' 8\").    Weight as of this encounter: 66.3 kg (146 lb 3.2 oz).  Medication Reconciliation: complete    Marilyn Mesa MA  "

## 2020-11-27 NOTE — PATIENT INSTRUCTIONS
-- Consult to Michelle to help switch to insulin pump and continue CGM    Aspects of Diabetes we can improve:  Hemoglobin A1c Lab Results   Component Value Date    A1C 9.4 02/13/2020    A1C 10.3 05/31/2019    A1C 9.3 11/01/2018    A1C 7.1 03/12/2018    Goal range is under 8. Best is 6.5 to 7   Blood Pressure 120/84 Goal to keep less than 140/90   Tobacco  reports that he quit smoking about 5 years ago. His smoking use included cigarettes. He has a 10.00 pack-year smoking history. He has never used smokeless tobacco. Goal to abstain from tobacco   Aspirin yes Aspirin reduces risk of heart disease and stroke   ACE/ARB allergy These medications reduce risk of kidney disease   Cholesterol restart Statins reduce risk of heart disease and stroke   Eye Exam due Annual diabetic eye exam   Healthy weight Body mass index is 22.23 kg/m . Goal BMI under 30, best is under 25.      -- I'm trying to exercise daily (goal at least 20 min/day) with moderate aerobic activity   -- Eat healthy (resources from ADA at http://www.diabetes.org/)   -- I'm taking good care of my feet. Consider seeing the Podiatrist   -- Check blood sugars as directed, record in log book and bring to every appointment   -- Goal sugar before breakfast: under 140   -- Goal sugar 2 hours after supper: under 170   -- Next diabetes lab draw: 3-6 months   -- Next diabetes office visit: 3-6 months

## 2020-11-27 NOTE — PROGRESS NOTES
Subjective  Rick Contreras is a 50 year old male who presents for diabetes follow-up.  He has a history of diabetes mellitus type 2 and continues on Levemir 15 units nightly, NovoLog 8 units 3 times daily AC plus correction scale.  His blood sugars have been high.  Right now 251.  Over the last 24 hours 180-350.  He does recall a low of 35 a few weeks ago.  If he starts going into the low 100s he needs to eat.  He sets an alarm every 4 hours so he can eat throughout the night.  He worries about going lower than 190.  Has a history of hypothyroidism and continues on levothyroxine.  He stopped taking the statin because he thought maybe it was causing his dizziness.  He found out it was caused by the lisinopril which was discontinued.  He continues on aspirin for prophylaxis.  He keeps getting a rash on the right foot.  It starts in between the toes and then moves on to the plantar aspect of the foot.  It actually looks pretty good right now.  Usually improves with baby powder or moisturizer.    Allergies: reviewed in EMR  Medications: reviewed in EMR  Problem List/PMH:reviewed in EMR    Social Hx:  Social History     Tobacco Use     Smoking status: Former Smoker     Packs/day: 0.50     Years: 20.00     Pack years: 10.00     Types: Cigarettes     Quit date: 2015     Years since quittin.2     Smokeless tobacco: Never Used     Tobacco comment: Quit smoking: has chantix   Substance Use Topics     Alcohol use: Yes     Alcohol/week: 1.0 standard drinks     Comment: ocassionally     Drug use: No     Social History     Social History Narrative    Unemployed     I reviewed social history and made relevant updates today.    Family Hx:   Family History   Problem Relation Age of Onset     Diabetes Mother         Diabetes     Diabetes Father         Diabetes     Cancer Father         Cancer,Skin, throat     Anesthesia Reaction No family hx of      Clotting Disorder No family hx of      Cerebrovascular Disease No family  hx of      Colon Cancer No family hx of        Objective  Vitals: reviewed in EMR.  /84 (BP Location: Right arm, Patient Position: Sitting, Cuff Size: Adult Regular)   Pulse 81   Temp 97.8  F (36.6  C) (Tympanic)   Resp 16   Wt 66.3 kg (146 lb 3.2 oz)   SpO2 99%   BMI 22.23 kg/m      Gen: Pleasant male, NAD.  HEENT: MMM  Neck: Supple  Pulm: Breathing easily  Neuro: Grossly intact  Skin: No concerning lesions.  Psychiatric: Normal affect and insight. Does not appear anxious or depressed.      Diabetes Labs  Hemoglobin A1C (%)   Date Value   02/13/2020 9.4 (H)   05/31/2019 10.3 (H)   11/01/2018 9.3 (H)   03/12/2018 7.1 (H)     Creatinine (mg/dL)   Date Value   03/03/2020 1.06   02/13/2020 1.08   05/31/2019 1.10   11/01/2018 1.05   03/12/2018 0.99   10/19/2017 0.99     Glucose (mg/dL)   Date Value   03/03/2020 299 (H)   02/13/2020 321 (H)   05/31/2019 169 (H)   11/01/2018 318 (H)   03/12/2018 167 (H)   03/09/2018 120 (H)     Potassium (mmol/L)   Date Value   03/03/2020 4.1   02/13/2020 4.4   05/31/2019 4.1   11/01/2018 4.6   03/12/2018 4.0   10/19/2017 4.8       Assessment    ICD-10-CM    1. Type 2 diabetes mellitus with hyperglycemia, with long-term current use of insulin (H)  E11.65 Lipid Profile    Z79.4 Basic Metabolic Panel     Hemoglobin A1c     AMBULATORY ADULT DIABETES EDUCATOR REFERRAL     simvastatin (ZOCOR) 20 MG tablet     Hemoglobin A1c     Basic Metabolic Panel     Lipid Profile   2. Need for hepatitis C screening test  Z11.59 Hepatitis C Screen Reflex to HCV RNA Quant and Genotype     Hepatitis C Screen Reflex to HCV RNA Quant and Genotype   3. Elevated TSH  R79.89 Thyrotropin GH     Thyrotropin GH   4. Need for prophylactic vaccination and inoculation against influenza  Z23 INFLUENZA VACCINE IM > 6 MONTHS VALENT IIV4 [64819]   5. Tinea pedis of right foot  B35.3 clotrimazole (LOTRIMIN) 1 % external cream   6. Acquired hypothyroidism  E03.9 T4, Free     T4, Free     With regards to his  "diabetes I think he is a brittle diabetic.  He also is very worried about hypoglycemia.  It is unclear to me if he is truly going hypoglycemic at all or he simply feels hypoglycemic and has a \"reset thermostat\" so to speak.  I encouraged him to work on keeping his blood sugars in the 100s if possible.  Close follow-up with Michelle from diabetes education is recommended to help with setting alarms on his CGM, initiating insulin pump therapy.    Patient currently uses a Dexcom G6 CGM for continuous monitoring of glucose.   Patient injects or boluses insulin 3 or more times daily before breakfast, before lunch, before dinner, and bedtime.  Patient requires frequent adjustments to their insulin treatment regimen on the basis of therapeutic CGM testing results.      Plan  Patient Instructions      -- Consult to Michelle to help switch to insulin pump and continue CGM    Aspects of Diabetes we can improve:  Hemoglobin A1c Lab Results   Component Value Date    A1C 9.4 02/13/2020    A1C 10.3 05/31/2019    A1C 9.3 11/01/2018    A1C 7.1 03/12/2018    Goal range is under 8. Best is 6.5 to 7   Blood Pressure 120/84 Goal to keep less than 140/90   Tobacco  reports that he quit smoking about 5 years ago. His smoking use included cigarettes. He has a 10.00 pack-year smoking history. He has never used smokeless tobacco. Goal to abstain from tobacco   Aspirin yes Aspirin reduces risk of heart disease and stroke   ACE/ARB allergy These medications reduce risk of kidney disease   Cholesterol restart Statins reduce risk of heart disease and stroke   Eye Exam due Annual diabetic eye exam   Healthy weight Body mass index is 22.23 kg/m . Goal BMI under 30, best is under 25.      -- I'm trying to exercise daily (goal at least 20 min/day) with moderate aerobic activity   -- Eat healthy (resources from ADA at http://www.diabetes.org/)   -- I'm taking good care of my feet. Consider seeing the Podiatrist   -- Check blood sugars as directed, " record in log book and bring to every appointment   -- Goal sugar before breakfast: under 140   -- Goal sugar 2 hours after supper: under 170   -- Next diabetes lab draw: 3-6 months   -- Next diabetes office visit: 3-6 months          Signed, Adebayo Dubois MD, FAAP, FACP  Internal Medicine & Pediatrics

## 2020-11-30 LAB — HCV AB SERPL QL IA: NONREACTIVE

## 2020-12-15 ENCOUNTER — PATIENT OUTREACH (OUTPATIENT)
Dept: EDUCATION SERVICES | Facility: OTHER | Age: 50
End: 2020-12-15
Attending: INTERNAL MEDICINE
Payer: COMMERCIAL

## 2020-12-15 DIAGNOSIS — Z79.4 TYPE 2 DIABETES MELLITUS WITH HYPERGLYCEMIA, WITH LONG-TERM CURRENT USE OF INSULIN (H): Primary | ICD-10-CM

## 2020-12-15 DIAGNOSIS — E11.65 TYPE 2 DIABETES MELLITUS WITH HYPERGLYCEMIA, WITH LONG-TERM CURRENT USE OF INSULIN (H): Primary | ICD-10-CM

## 2020-12-15 PROCEDURE — G0108 DIAB MANAGE TRN  PER INDIV: HCPCS | Mod: TEL

## 2020-12-15 NOTE — PROGRESS NOTES
"Rick Contreras is a 50 year old male who is being evaluated via a billable telephone visit.      The patient has been notified of following:     \"This telephone visit will be conducted via a call between you and your physician/provider. We have found that certain health care needs can be provided without the need for a physical exam.  This service lets us provide the care you need with a short phone conversation.  If a prescription is necessary we can send it directly to your pharmacy.  If lab work is needed we can place an order for that and you can then stop by our lab to have the test done at a later time.    Telephone visits are billed at different rates depending on your insurance coverage. During this emergency period, for some insurers they may be billed the same as an in-person visit.  Please reach out to your insurance provider with any questions.    If during the course of the call the physician/provider feels a telephone visit is not appropriate, you will not be charged for this service.\"    Patient has given verbal consent for Telephone visit?  Yes    What phone number would you like to be contacted at? [phone]    How would you like to obtain your AVS? Mail a copy    Phone call duration: 60 minutes    Michelle Crow RN     Telephone Visit:  Follow-up Diabetes Education with BG Assessment    SUBJECTIVE:  Rick Contreras is an 50 year old male who visits, by telephone, for evaluation and treatment of Type 2 diabetes mellitus.     Patient states his insurance did not cover the pump earlier this year, but would like to \"try it again to see if it will be covered.\"  Patient happy he received the CGM, but is now ready for the pump.    Current treatment includes   Diabetes Medication(s)     Insulin       insulin aspart (NOVOLOG FLEXPEN) 100 UNIT/ML pen    Inject 8 Units Subcutaneous 3 times daily (with meals) +Correction: 1 unit for 150-199, 2 unit 200-249, 3 unit 250-299, 4 unit > 300.     insulin " "detemir (LEVEMIR FLEXTOUCH) 100 UNIT/ML pen    Inject 15 Units Subcutaneous At Bedtime      Patient shares, \"I don't take Novolog 8 units with meals, just 5 units with meals and my sugar still drops fast!  I do take the Levemir 15 units at bedtime, but I can drop fast too after the Levemir if BG is not above 400 at bedtime.  I will drop by 2 AM and then have to eat again.  Last night BG greater than 400 and at 2 AM I was dropping below 300 so I ate a fig coombs cookie and this morning, fasting  mg/dL.\"      Current monitoring regimen: Dexcom G6 CGM  Home blood sugar records: Unable to upload CGM device today, but patient states SG less than 150 to over 400 mg/dL.       Discussed D5 Health Goals and patient has met 4 of 5 goals at this time.  (BP less than 140/90, ASA therapy as recommended, Statin therapy as recommended, A1c less than 8%, tobacco free).     No results found for: GLUCOSE  Cholesterol   Date/Time Value Ref Range Status   2020 03:37  <200 mg/dL Final     HDL Cholesterol   Date/Time Value Ref Range Status   2020 03:37 PM 48 23 - 92 mg/dL Final     LDL Cholesterol Calculated   Date/Time Value Ref Range Status   2020 03:37 PM 74 <100 mg/dL Final     Comment:     Desirable:       <100 mg/dl     No components found for: MICROALBCRU, [id number], MICROALBRAND      Social History     Tobacco Use     Smoking status: Former Smoker     Packs/day: 0.50     Years: 20.00     Pack years: 10.00     Types: Cigarettes     Quit date: 2015     Years since quittin.2     Smokeless tobacco: Never Used     Tobacco comment: Quit smoking: has chantix   Substance Use Topics     Alcohol use: Yes     Alcohol/week: 1.0 standard drinks     Comment: ocassionally        Allergies:Lisinopril and Metformin     OBJECTIVE:  There were no vitals taken for this visit.      ASSESSMENT:  Patient shares he consumes 48 - 60 grams CHO at breakfast and at supper, but \"I usually don't eat lunch, if I am " "really high, I will take the sliding scale.\"    Patient shares his BG drops low if he is working and needs to consume a jelly sandwich or a bowl of ice cream to help prevent lows while working.  Patient shares he often skips insulin or just takes it for high BG, \"and then I still drop!\"    Unable to upload CGM device, patient states his SG \"is high and low, like less than 150 to over 400.\"    Discussed BG targets and HbA1c goal.  Stressed importance of keeping BG between 80 - 180 to help minimize risk for possible complications of uncontrolled diabetes.      Discussed benefits of keeping A1c under 7% and encouraged patient to begin allowing BG to stay less than 200 mg/dL.       Rule of 15 for hypoglycemia discussed to help decrease risk of rebound hyperglycemia.  Recommend patient use glucose tabs, gel, or glucose drink to treat hypoglycemia when BG less than 70 mg/dL.     PLAN:    1.  To help decrease risk of glucose dropping overnight and lows during activity, recommend Levemir decrease from 15 to 10 units at bedtime.    2.  To help patient gain confidence to use mealtime insulin, recommend Novolog decrease from 5 to 4 units for his 60 grams.  This is ICR 1:15 grams.      3.  Due to patient report, \"my sliding scale makes me drop too far, recommend beginning an extremely sensitive Novolog sliding scale as:     to 324 = 1 extra unit Novolog   to 424 = 2 extra units Novolog   or greater = 3 extra units Novolog    4.  Begin to practice counting CHO grams at every meal.  Use ICR 1:15 grams or 4 units for 60 grams.    5.  Request patient follow up next Wednesday, 12/23 for BG assessment/insulin adjustment.    6. [facility] will reach out to [employer] to resubmit paperwork to insurance for possible coverage of insulin pump.  Patient meets insurance guidelines for coverage of insulin pump.       Educational Handouts Mailed:  D5 Health Goals, A1c brochure, My Food Plan.    PLAN:    Medication " recommendations:dosage change: See insulin dose recommendations above.    Patient will test blood glucose using CGM as above or as previously directed.    Patient will begin low CHO meal plan to help improve BG results with possible weight loss benefit.    Patient encouraged to develop physical activity plan or continue with current plan.    Patient will follow up with provider as directed by PCP.      Diabetes recommendations: diabetic diet discussed in detail, written exchange diet given, use and side effects of insulin is reviewed, glycohemoglobin monitoring discussed and long term diabetic complications discussed    Time spent on phone visit was 60 minutes.     Michelle Crow RN, BSN, [facility]  12/15/2020 10:02 AM

## 2020-12-15 NOTE — PATIENT INSTRUCTIONS
Diabetes Goals and Reminders    Your A1c test should be done every 3 months.  Your goal is less than 7%.   Your last result is:  Lab Results   Component Value Date    A1C 10.1 11/27/2020       Your LDL cholesterol test should be done at least once a year.  Take a statin, if prescribed by your doctor, based on age and risk factors.  Your last result is:  LDL Cholesterol Calculated   Date Value Ref Range Status   11/27/2020 74 <100 mg/dL Final     Comment:     Desirable:       <100 mg/dl       Have blood pressure and weight checked every three months.  Your blood pressure goal is 140/90 or less.  Your last blood pressure reading was:   BP Readings from Last 1 Encounters:   11/27/20 120/84       Use CGM to monitor glucose.  Your home blood glucose target ranges are:  Fasting or Before meals:   2 hours after a meal: Less than 180  Bedtime 100 - 140 mg/dL       Avoid all tobacco.  Follow your healthy diet and exercise plan.  See the eye doctor every year.  See the dentist every six months.  Have kidney function tested yearly.    Take all medicine as prescribed.  Please let me know if you are having symptoms you don t expect or if you wish to stop any medicine.    Follow Up Plan  Please call or visit Diabetes Education if blood sugars are consistently out of target.  Your future lab plan is:  HgA1c in February 2021.    If you need your cholesterol checked at your next appointment, you should fast 8 to 10 hours before your appointment.  Do not eat or drink anything besides water.  Drink plenty of water and take all your usual medicine.    SUMMARY FOR TODAY'S VISIT    1.  1.  To help decrease risk of glucose dropping overnight and lows during activity, recommend Levemir decrease from 15 to 10 units at bedtime.     2.  Novolog Mealtime Insulin:  Recommend Novolog decrease from 5 to 4 units for 60 grams.  This is Insulin Carb Ratio (ICR) 1 unit for every 15 grams.  Begin to practice counting carb grams at the meal as  this will help you when transitioning to the insulin pump.  You can look up foods on the [employer] [place].     3.  Due to report of sliding scale dropping BG too far, recommend beginning an extremely sensitive Novolog sliding scale as:      to 324 = 1 extra unit Novolog   to 424 = 2 extra units Novolog   or greater = 3 extra units Novolog     4.  Request patient follow up next Wednesday, 12/23 for BG assessment/insulin adjustment.     5.  Michelle will reach out to [employer] to resubmit paperwork to insurance for possible coverage of insulin pump.  You do meet insurance guidelines for coverage of insulin pump.    Michelle Crow RN, BSN, [facility]  12/15/2020 12:06 PM

## 2020-12-23 ENCOUNTER — TELEPHONE (OUTPATIENT)
Dept: EDUCATION SERVICES | Facility: OTHER | Age: 50
End: 2020-12-23

## 2020-12-23 DIAGNOSIS — E11.65 TYPE 2 DIABETES MELLITUS WITH HYPERGLYCEMIA, WITH LONG-TERM CURRENT USE OF INSULIN (H): Primary | ICD-10-CM

## 2020-12-23 DIAGNOSIS — Z79.4 TYPE 2 DIABETES MELLITUS WITH HYPERGLYCEMIA, WITH LONG-TERM CURRENT USE OF INSULIN (H): Primary | ICD-10-CM

## 2020-12-23 RX ORDER — INSULIN ASPART 100 [IU]/ML
4 INJECTION, SOLUTION INTRAVENOUS; SUBCUTANEOUS
Qty: 15 ML | Refills: 11 | Status: SHIPPED | OUTPATIENT
Start: 2020-12-23 | End: 2021-02-26

## 2020-12-23 NOTE — TELEPHONE ENCOUNTER
"Patient shares after he decrease Levemir from 15 to 10 units, \"I was in the 300s a lot.  I went to bed in the 300s and woke up in the 300s.\"  Patient shares he increased his Levemir back to 15 units at bedtime.      BG report:  Since increasing the Levemir back to 15 units, my  - 160, before lunch and supper  - 156, and bedtime  - 400 mg/dL.  \"If I am at 200 at bedtime, I will eat a snack because I drop during the night.  If my sugar is 400 at bedtime, I wake up fine in the 150s.\"    Patient states he still treats lower glucose, during the day, with carb \"if my CGM arrow is trending down and my  - 150, I will eat, then the glucose 'levels out' around 80 - 90, but now my smaller snack gets me back up to 137 - 160 range.  I no longer go super high, I feel my sugars are getting better.\"    Reinforced BG target range (BG target:  FBG/Pre-meal 80 - 130 and 2-hr PP less than 180 mg/dL, Bedtime 100 - 140 mg/dL)     Discussed BG dropping at night and how Levemir action may be increasing this risk, plus glucose dropping below 100 during the day.      Recommendation:  1.  Decrease Levemir from 15 to 12 units and monitor BG overnight.  Glucose should remain level, NOT be dropping close to 250 mg/dL while sleeping.    2.  Continue Novolog, as prescribed, using ICR 1:15 grams and Novolog Sliding Scale for insulin sensitivity 1:100 mg/dL.    3.  Follow up for BG assessment, Thursday, 1/7/2021.  Recommend CGM review in mid-January.    4.  Follow up next week with Andrei Oden, if you do not hear about pump paperwork status.    Michelle Crow RN, BSN, [facility]  12/23/2020 3:25 PM           "

## 2021-01-14 ENCOUNTER — MEDICAL CORRESPONDENCE (OUTPATIENT)
Dept: HEALTH INFORMATION MANAGEMENT | Facility: OTHER | Age: 51
End: 2021-01-14

## 2021-01-19 ENCOUNTER — TELEPHONE (OUTPATIENT)
Dept: PEDIATRICS | Facility: OTHER | Age: 51
End: 2021-01-19

## 2021-01-19 DIAGNOSIS — Z79.4 TYPE 2 DIABETES MELLITUS WITH HYPERGLYCEMIA, WITH LONG-TERM CURRENT USE OF INSULIN (H): Primary | Chronic | ICD-10-CM

## 2021-01-19 DIAGNOSIS — E11.65 TYPE 2 DIABETES MELLITUS WITH HYPERGLYCEMIA, WITH LONG-TERM CURRENT USE OF INSULIN (H): Primary | Chronic | ICD-10-CM

## 2021-01-19 NOTE — TELEPHONE ENCOUNTER
Last office visit with Dr. Dubois 11-, last foot exam with Dr. Dubois 2-.    Patient requesting new order for Diabetic Shoes and Inserts for them to be faxed [phone][facility]      Order pending.    Cherelle Rick LPN 1/19/2021 11:22 AM                     
Orders sent to be faxed to Teo Mesa CMA on 1/19/2021 at 1:51 PM      
Pt states that he would like a new Rx for diabetic shoes.  
1

## 2021-01-22 ENCOUNTER — TRANSFERRED RECORDS (OUTPATIENT)
Dept: HEALTH INFORMATION MANAGEMENT | Facility: OTHER | Age: 51
End: 2021-01-22

## 2021-01-26 DIAGNOSIS — E03.9 ACQUIRED HYPOTHYROIDISM: ICD-10-CM

## 2021-01-27 ENCOUNTER — TELEPHONE (OUTPATIENT)
Dept: EDUCATION SERVICES | Facility: OTHER | Age: 51
End: 2021-01-27

## 2021-01-27 DIAGNOSIS — Z79.4 TYPE 2 DIABETES MELLITUS WITH HYPERGLYCEMIA, WITH LONG-TERM CURRENT USE OF INSULIN (H): Primary | ICD-10-CM

## 2021-01-27 DIAGNOSIS — E11.65 TYPE 2 DIABETES MELLITUS WITH HYPERGLYCEMIA, WITH LONG-TERM CURRENT USE OF INSULIN (H): Primary | ICD-10-CM

## 2021-01-27 RX ORDER — LEVOTHYROXINE SODIUM 100 UG/1
TABLET ORAL
Qty: 90 TABLET | Refills: 0 | Status: SHIPPED | OUTPATIENT
Start: 2021-01-27 | End: 2021-04-28

## 2021-01-27 NOTE — TELEPHONE ENCOUNTER
"[facility] sent Rx request for the following:   insulin syringe-needle U-100 (31G X 5/16\" 1 ML) 31G X 5/16\" 1 ML miscellaneous  Sig: Use 4 syringes daily or as directed.    Last Prescription Date:   None noted  Last Fill Qty/Refills:         0, R-0    Last Office Visit:              11/27/2020 (Silvino)   Future Office visit:           None noted    Note from pharmacy: Patient requesting Rx for syringes.    Call made to pharmacy, clarification of specific Rx size/amount. Not on patient historical medication list. Will route to PCP for review. Unable to complete prescription refill per RN Medication Refill Policy.................... Vee Young RN ....................  1/27/2021   4:28 PM        "

## 2021-01-27 NOTE — TELEPHONE ENCOUNTER
Patient will be visiting [facility] for new [employer] TSlim X2 insulin pump with [employer] training on 2/8, 1:00 pm.      He will be switching from Novolog Flexpen to Novolog vial solution to fill insulin cartridges.    If accepted as written, please sign pending orders.    Thank you,    Michelle Crow RN, BSN, [facility]  1/27/2021 10:56 AM

## 2021-01-27 NOTE — TELEPHONE ENCOUNTER
[id number] sent Rx request for the following:      Requested Prescriptions   Pending Prescriptions Disp Refills   levothyroxine (SYNTHROID/LEVOTHROID) 100 MCG tablet [Pharmacy Med Name: Levothyroxine Sodium 100 MCG Oral Tablet] 90 tablet 0    Sig: Take 1 tablet by mouth once daily   Last Prescription Date:   10/20/20  Last Fill Qty/Refills:         90, R-0    Last Office Visit:              11/27/20  Future Office visit:           None    Per LOV note, Pt was instructed to follow up 3-6 months later; between 2/27 and 5/27/21. Prescription approved per [facility] Refill Protocol for 90 days. Rebekah Ramirez RN .............. 1/27/2021  8:58 AM

## 2021-01-28 RX ORDER — SYRINGE-NEEDLE,INSULIN,0.5 ML 27GX1/2"
SYRINGE, EMPTY DISPOSABLE MISCELLANEOUS
Qty: 100 EACH | Refills: 1 | Status: SHIPPED | OUTPATIENT
Start: 2021-01-28 | End: 2022-02-03

## 2021-02-08 ENCOUNTER — ALLIED HEALTH/NURSE VISIT (OUTPATIENT)
Dept: EDUCATION SERVICES | Facility: OTHER | Age: 51
End: 2021-02-08
Payer: COMMERCIAL

## 2021-02-08 DIAGNOSIS — Z79.4 TYPE 2 DIABETES MELLITUS WITH HYPERGLYCEMIA, WITH LONG-TERM CURRENT USE OF INSULIN (H): Primary | ICD-10-CM

## 2021-02-08 DIAGNOSIS — E11.65 TYPE 2 DIABETES MELLITUS WITH HYPERGLYCEMIA, WITH LONG-TERM CURRENT USE OF INSULIN (H): Primary | ICD-10-CM

## 2021-02-08 PROCEDURE — 99211 OFF/OP EST MAY X REQ PHY/QHP: CPT | Performed by: REGISTERED NURSE

## 2021-02-08 NOTE — PROGRESS NOTES
"Diabetes Self-Management Education & Support    Presents for: Insulin Pump and CGM Start    SUBJECTIVE/OBJECTIVE:  Presents for: Insulin Pump and CGM Start  Accompanied by: Self  Diabetes education in the past 24mo: Yes  Focus of Visit: Insulin Pump  Type of Pump visit: Pump Start  Diabetes type: Type 2  Date of diagnosis: 2016  Disease course: Getting harder to manage  How confident are you filling out medical forms by yourself:: Somewhat  Transportation concerns: Yes(\"My car needs work, so my uriel brings me to appts.\")  Difficulty affording diabetes medication?: No  Difficulty affording diabetes testing supplies?: No  Cultural Influences/Ethnic Background:  Not  or   Patient brings his Tandem TSlim X2 insulin pump with [employer] with supplies in for training today.      Diabetes Symptoms & Complications:       Patient Problem List and Family Medical History reviewed for relevant medical history, current medical status, and diabetes risk factors.    Vitals:  There were no vitals taken for this visit.  Estimated body mass index is 22.23 kg/m  as calculated from the following:    Height as of 6/19/20: 1.727 m (5' 8\").    Weight as of 11/27/20: 66.3 kg (146 lb 3.2 oz).   Last 3 BP:   BP Readings from Last 3 Encounters:   11/27/20 120/84   06/19/20 126/68   03/19/20 116/70       History   Smoking Status     Former Smoker     Packs/day: 0.50     Years: 20.00     Types: Cigarettes     Quit date: 8/30/2015   Smokeless Tobacco     Never Used     Comment: Quit smoking: has chantix       Labs:  Lab Results   Component Value Date    A1C 10.1 11/27/2020     Lab Results   Component Value Date     11/27/2020     Lab Results   Component Value Date    LDL 74 11/27/2020     HDL Cholesterol   Date Value Ref Range Status   11/27/2020 48 23 - 92 mg/dL Final   ]  GFR Estimate   Date Value Ref Range Status   11/27/2020 61 >60 mL/min/[1.73_m2] Final     GFR Estimate If Black   Date Value Ref Range Status "   11/27/2020 74 >60 mL/min/[1.73_m2] Final     Lab Results   Component Value Date    CR 1.25 11/27/2020     No results found for: MICROALBUMIN    Healthy Eating:  Healthy Eating Assessed Today: Yes  Cultural/Religion diet restrictions?: No  Meal planning/habits: Carb counting  How many times a week on average do you eat food made away from home (restaurant/take-out)?: 1  Meals include: Breakfast, Dinner, Afternoon Snack  Beverages: Other, Milk(SF Koolaid)  Has patient met with a dietitian in the past?: Yes    Being Active:  Being Active Assessed Today: Yes  Exercise:: Yes  Days per week of moderate to strenuous exercise (like a brisk walk): (Depends on the weather.)  On average, minutes per day of exercise at this level: 10(Walk the dog 10 - 30 minutes depending on the weather.)  How intense was your typical exercise? : Moderate (like brisk walking)  Barrier to exercise: None(My back doesn't allow me to sit for long periods.)    Monitoring:  Blood Glucose Meter: CGM  Blood glucose trend: Fluctuating(14d average  mg/dL.)    Taking Medications:  Diabetes Medication(s)     Insulin       insulin aspart (NOVOLOG FLEXPEN) 100 UNIT/ML pen    Inject 4 Units Subcutaneous 3 times daily (with meals) +Correction: 1 unit for 225 - 324, 2 unit 325 - 424, 3 unit 425 or higher. Max daily dose 21 units.     insulin aspart (NOVOLOG VIAL) 100 UNITS/ML vial    Use per insulin pump. Basal: 0.4 unit/hour, Bolus: ICR 1:15 grams, ISF 1:100 mg/dL.  Max daily dose 40 units.     insulin detemir (LEVEMIR FLEXTOUCH) 100 UNIT/ML pen    Inject 12 Units Subcutaneous At Bedtime          Taking Medication Assessed Today: Yes  Current Treatments: Insulin Injections  Dose schedule: Pre-breakfast, Pre-dinner, At bedtime  Given by: Patient  Injection/Infusion sites: Thighs  Problems taking diabetes medications regularly?: No  Diabetes medication side effects?: No    Problem Solving:  Problem Solving Assessed Today: Yes  Is the patient at risk for  "hypoglycemia?: Yes  Hypoglycemia Frequency: Rarely  Hypoglycemia Treatment: Other food(\"A peanut butter and jelly sandwich.\")  Medical ID: No  Does patient have glucagon emergency kit?: No  Does patient have severe weather/disaster plan for diabetes management?: Yes    Hypoglycemia symptoms  Sweats: Yes  Feeling shaky: Yes    Hypoglycemia Complications  Blackouts: No  Hospitalization: No  Nocturnal hypoglycemia: No  Required assistance: No  Required glucagon injection: No  Seizures: No    Reducing Risks:  Reducing Risks Assessed Today: Yes  Diabetes Risks: Family History, Age over 45 years  CAD Risks: Diabetes Mellitus, Male sex  Has dilated eye exam at least once a year?: Yes  Sees dentist every 6 months?: Yes  Feet checked by healthcare provider in the last year?: Yes    Healthy Coping:  Healthy Coping Assessed Today: Yes  Emotional response to diabetes: Acceptance  Informal Support system:: Family, Parent  Stage of change: ACTION (Actively working towards change)  Support resources: Offerings in Clinic Communities  Patient Activation Measure Survey Score:  No flowsheet data found.    Diabetes knowledge and skills assessment:   Patient is knowledgeable in diabetes management concepts related to: Being Active, Monitoring and Taking Medication    Patient needs further education on the following diabetes management concepts: Healthy Eating, Reducing Risks, Healthy Coping and Insulin Pump Concepts Balancing glucose and insulin  Carbohydrate counting  Problem solving with insulin pump therapy (BG monitoring; hypoglycemia signs/symptoms, treatment (glucagon) and prevention; hyperglycemia signs/symptoms, treatment and prevention; ketones, DKA signs/symptoms and prevention)  Hands on practice with basic pump button use    Based on learning assessment above, most appropriate setting for further diabetes education would be: Individual setting.      INTERVENTIONS:    Education provided today on:  AADE Self-Care " Behaviors:  Taking Medication: action of prescribed medication and insulin pump therapy.    Insulin Pump Training:   T:slim X2 Insulin Delivery System    BG at beginning of appointment: 341 mg/dL  BG at end of appointment: 274 mg/dL  Last basal insulin dose: 15 units  (taken 2/6/2021)  Last bolus insulin dose: 2 units at noon today.    Pump overview:  touch screen and general navigation, rechargeable battery    Home screen/ menu:  Status, battery life, CGM icons, units remaining, time/date, IOB (insulin on board),   Options - profiles, settings, suspend/resume insulin, history, temp basal, alerts   Bolus - food and correction calculator, extended bolus, reverse correction    Personal profile:  Timed settings: basal rate, correction factor, I:C ratio, BG target, edit, duplicate or review  Bolus settings: duration of insulin action, max bolus  Enter carbs into bolus calculator:  No Will be using Easy Bolus     Dexcom CGM:  Entering transmitter ID and sensor code  Urgent Low alert: Fixed at 55 mg/dl  Control IQ feature ON - (Pump suspends at 70 mg/dl if sensor glucose is predicted to be below 70 mg/dL; pump decreases basal if sensor glucose is predicted to be below 112.5 mg/dL; pump increases basal insulin if sensor glucose is predicted to be above 160 mg/dL; pump gives automatic correction bolus if sensor glucose is predicted to be above 180 mg/dL)  (pump suspends at 70 mg/dl or when predicts 70 mg/dl in 30 min): Yes     Infusion set:  Loading cartridge- minimum and maximum amounts, site selection and prep, tubing and canula fills, change set every 2-3 days    Safety:  troubleshooting, low reservoir, alerts and alarms, importance of back-up plan, hypoglycemia, sick day management, hyperglycemia and DKA prevention    T:Connect: uploading pump: Yes     Additional topics: 24/7 Customer Care helpline [phone], removal for xray, CT, and MRI, back up plan, when and how to order pump supplies, when to call a healthcare  "provider.     Education materials provided to patient:    T:Slim Diabetes Care packet, Menu Map  Infusion Set Options  Bolus Quick Reference Guide  Basal Quick Reference Guide  Tips to remember after starting an insulin pump  Tandem Help Sheets:  Control IQ Technology, Bolus, View Status, Personal Profile, Load a Cartridge      ASSESSMENT:    Diabetes Education review given for treatment of hypoglycemia and hyperglycemia and importance of ketone testing if BG greater than 250 mg/dL, (two readings in a row).  Reviewed CHO counting and exercise.    Patient concerned about high BG, but tends to keep his glucose high.  Discussed importance of dropping average glucose, with goal less than 180 mg/dL after meals and 80 - 130 before each meal.      CGM report shows patterns of high glucose between 3:45 pm and 8:35 am AND between 10:55 am and 11:30 am.  Encouraged patient to follow up closely with [facility] to help bring glucose into target with help from Control IQ pump.     Patient happy to begin the pump that will help decrease risk of hypoglycemia.  \"I just drop so fast, especially when I work, so I try to keep my sugars higher.\"   Patient practiced using ACTIVITY, starting EXERCISE which will adjust auto settings to help decrease risk of low glucose during activity.  SLEEP schedule set up 11:00 PM through 8:00 AM.      Reviewed pump therapy concepts:  Basal/bolus therapy, insulin action time, insulin on board, insulin to carb ratio, correction factor, target BG and meal bolus/correction bolus, Quick bolus.      Due to patient's request to use Quick bolus for his meals, Quick bolus set for 0.5 unit increment.  Patient practiced taking insulin for CHO, completed bolus without difficulty.     Guided patient as he entered program settings into his pump for bolus advice and basal delivery.  Patient practiced using BOLUS advice feature on the pump without difficulty, entering BG into pump, pump delivered correction bolus during " visit today, 4:00 pm..     Patient followed steps, filled cartridge with insulin and started his pump.      Encouraged patient to follow up tomorrow and schedule follow up pump visit in the next two weeks.  Encouraged patient to call if any questions or concerns.        Insulin pump was programmed according to Pump Start Orders signed by Dr. Dubois.     Pt verbalized understanding of concepts discussed and recommendations provided today. Patient was able to start insulin pump without difficulty. Yes         PLAN  First, patient will enter/accept SG reading in BOLUS and take correction before each meal, snack and at bedtime (minimum of 4 times daily).    Second, patient will use his Quick bolus and take insulin for his meals after entering/accepting SG for correction of high SG.       See Patient Instructions for co-developed, patient-stated behavior change goals.  AVS printed and provided to patient today. See Follow-Up section for recommended follow-up.    Michelle Crow RN, BSN, [facility]  2/8/2021 4:38 PM   Time Spent: 200 minutes  Encounter Type: Individual    Any diabetes medication dose changes were made via the CDE Protocol and Collaborative Practice Agreement with the patient's primary care provider. A copy of this encounter was shared with the provider.

## 2021-02-08 NOTE — PATIENT INSTRUCTIONS
Diabetes Goals and Reminders    Your A1c test should be done every 3 months.  Your goal is less than 7%.   Your last result is:  Lab Results   Component Value Date    A1C 10.1 11/27/2020       Your LDL cholesterol test should be done at least once a year.    Your last result is:  LDL Cholesterol Calculated   Date Value Ref Range Status   11/27/2020 74 <100 mg/dL Final     Comment:     Desirable:       <100 mg/dl       Have blood pressure and weight checked every three months.  Your blood pressure goal is 140/90 or less.  Your last blood pressure reading was:    BP Readings from Last 1 Encounters:   11/27/20 120/84       Use your CGM and enter your blood sugar 4 times per day.  Your home blood glucose target ranges are:  Before meals:    2 hours after a meal:  Less than 180  Bedtime:  100-140    Avoid all tobacco.  Follow your healthy diet and exercise plan.  See the eye doctor every year.  See the dentist every six months.  Have kidney function tested yearly.    Take all medicine as prescribed.  Please let me know if you are having symptoms you don t expect or if you wish to stop any medicine.    Current Pump settings   Pump Type:  Tandem TSlim X2 insulin pump with Control [employer]  Insulin Type: Novolog  BASAL RATES and times:  12   AM (midnight): 0.600 units/hour    Insulin to Carbohydrate Ratio (ICR):   1 unit per 15 grams of carbohydrate at 0:00.  Insulin Sensitivity:   100 mg/dl at 0:00.  Target Blood Glucose:   110 mg/dL.  Active Insulin: 5 hours      Follow Up Plan  Your future lab plan is:  HgA1c at end of this month.    If you need your cholesterol checked at your next appointment, you should fast 8 to 10 hours before your appointment.  Do not eat or drink anything besides water.  Drink plenty of water and take all your usual medicine.    SUMMARY FOR TODAY'S VISIT    1.  Reviewed basic pump therapy concepts such as Basal/bolus therapy, insulin action time, insulin on board, insulin to carb ratio,  correction factor, target BG and meal bolus/correction bolus.     2.  Begin using your new Tandem TSlim X2 pump with Control[employer]IQ Technology utilizing Dexcom G6 CGM.  Insulin pump settings adjusted to help gain confidence in your pump so you can begin enter glucose into pump for bolus advice.       3. Please call Tandem technical support with any malfunction, 24/7 at [phone].  This phone number is on the back of your pump as well as in Pump Info inside your pump.     3.  Please call Diabetes Education tomorrow and if you have any questions or concerns.  [phone]     4.  Please follow up for insulin pump management, in 2 - 3 weeks.        Michelle Crow RN, BSN, CDCES, CPT  2/8/2021 3:39 PM

## 2021-02-10 ENCOUNTER — TELEPHONE (OUTPATIENT)
Dept: EDUCATION SERVICES | Facility: OTHER | Age: 51
End: 2021-02-10

## 2021-02-10 NOTE — TELEPHONE ENCOUNTER
"Patient states he was successful today changing out his first infusion set and cgm sensor utilizing Tandem pump as his , as well as his cell phone, reports, \"things are going well.\"    SG report:  \"My numbers are better.  I am at 123 right now and I feel good.  Yesterday fasting was 176 and before supper it was 200 - 250, my bedtime numbers are lower around 180 now.\"    Encouraged patient to continue current plan and visit [facility] in 2 - 3 weeks for Insulin Pump Follow Up visit.    Michelle Crow RN, BSN, [facility]  2/10/2021 12:28 PM     "

## 2021-02-26 ENCOUNTER — OFFICE VISIT (OUTPATIENT)
Dept: PEDIATRICS | Facility: OTHER | Age: 51
End: 2021-02-26
Attending: INTERNAL MEDICINE
Payer: COMMERCIAL

## 2021-02-26 VITALS
WEIGHT: 148.7 LBS | SYSTOLIC BLOOD PRESSURE: 138 MMHG | OXYGEN SATURATION: 98 % | DIASTOLIC BLOOD PRESSURE: 70 MMHG | RESPIRATION RATE: 16 BRPM | TEMPERATURE: 97.2 F | BODY MASS INDEX: 22.61 KG/M2 | HEART RATE: 78 BPM

## 2021-02-26 DIAGNOSIS — L08.9 LOCAL INFECTION OF SKIN AND SUBCUTANEOUS TISSUE: Primary | ICD-10-CM

## 2021-02-26 DIAGNOSIS — G56.02 CARPAL TUNNEL SYNDROME OF LEFT WRIST: ICD-10-CM

## 2021-02-26 DIAGNOSIS — G56.21 CUBITAL TUNNEL SYNDROME ON RIGHT: ICD-10-CM

## 2021-02-26 DIAGNOSIS — Z79.4 TYPE 2 DIABETES MELLITUS WITH HYPERGLYCEMIA, WITH LONG-TERM CURRENT USE OF INSULIN (H): Chronic | ICD-10-CM

## 2021-02-26 DIAGNOSIS — E11.65 TYPE 2 DIABETES MELLITUS WITH HYPERGLYCEMIA, WITH LONG-TERM CURRENT USE OF INSULIN (H): Chronic | ICD-10-CM

## 2021-02-26 PROCEDURE — G0463 HOSPITAL OUTPT CLINIC VISIT: HCPCS

## 2021-02-26 PROCEDURE — 99214 OFFICE O/P EST MOD 30 MIN: CPT | Performed by: INTERNAL MEDICINE

## 2021-02-26 RX ORDER — LORAZEPAM 0.5 MG/1
TABLET ORAL
COMMUNITY
Start: 2020-12-03 | End: 2022-02-03

## 2021-02-26 RX ORDER — MUPIROCIN 20 MG/G
OINTMENT TOPICAL 3 TIMES DAILY
Qty: 30 G | Refills: 3 | Status: SHIPPED | OUTPATIENT
Start: 2021-02-26 | End: 2022-09-12

## 2021-02-26 RX ORDER — SULFAMETHOXAZOLE/TRIMETHOPRIM 800-160 MG
1 TABLET ORAL 2 TIMES DAILY
Qty: 14 TABLET | Refills: 0 | Status: SHIPPED | OUTPATIENT
Start: 2021-02-26 | End: 2021-03-05

## 2021-02-26 RX ORDER — ZOLPIDEM TARTRATE 5 MG/1
TABLET ORAL
COMMUNITY
Start: 2021-02-22 | End: 2022-02-03 | Stop reason: DRUGHIGH

## 2021-02-26 ASSESSMENT — PAIN SCALES - GENERAL: PAINLEVEL: SEVERE PAIN (6)

## 2021-02-26 ASSESSMENT — PATIENT HEALTH QUESTIONNAIRE - PHQ9: SUM OF ALL RESPONSES TO PHQ QUESTIONS 1-9: 8

## 2021-02-26 NOTE — PROGRESS NOTES
Subjective   Rick Contreras is a 51 year old male who presents for sores on arm.  He had something similar a couple of years ago.  [facility]  He had the area drained.  No culture was obtained at that time.  He took antibiotics and it went away.  Over the last 2 weeks his symptoms have returned.  He has been trying to squeeze them to get some fluid out but it will not come out.  He has tingling of the right arm from the elbow to the hand and in the left hand.    Objective   Vitals: /70 (BP Location: Right arm, Patient Position: Sitting, Cuff Size: Adult Regular)   Pulse 78   Temp 97.2  F (36.2  C) (Tympanic)   Resp 16   Wt 67.4 kg (148 lb 11.2 oz)   SpO2 98%   BMI 22.61 kg/m      Skin: Areas of excoriation on the forearm see photo          Review and Analysis of Data   I personally reviewed the following:  External notes: No  Results: No  Use of an independent historian: No  Independent review of a test performed by another physician: No  Discussion of management with another physician: No  Moderate risk of morbidity from additional diagnostic testing and/or treatment.    Assessment & Plan   1. Local infection of skin and subcutaneous tissue  This appears to be most consistent with a bacterial skin infection with either staph or strep however differential diagnosis also includes prurigo nodularis, squamous cell carcinoma, pemphigus vulgaris, others.  - mupirocin (BACTROBAN) 2 % external ointment; Apply topically 3 times daily  Dispense: 30 g; Refill: 3  - sulfamethoxazole-trimethoprim (BACTRIM DS) 800-160 MG tablet; Take 1 tablet by mouth 2 times daily for 7 days  Dispense: 14 tablet; Refill: 0  - chlorhexidine (HIBICLENS) 4 % liquid; Apply topically daily as needed for wound care  Dispense: 947 mL; Refill: 3    2. Type 2 diabetes mellitus with hyperglycemia, with long-term current use of insulin (H)  He has been on the insulin pump and is better controlled.  We expect his next A1c will  be better.    3. Cubital tunnel syndrome on right  - NEUROLOGY ADULT REFERRAL  - Orthopedic & Spine  Referral; Future    4. Carpal tunnel syndrome of left wrist  - NEUROLOGY ADULT REFERRAL  - Orthopedic & Spine  Referral; Future      Patient Instructions    -- Bactrim x 1 week   -- Eat yogurt 1-2 times per day while on antibiotics (and for a few weeks after) to reduce the chances of diarrhea   -- Mupirocin twice daily until healed   -- Don't pick at it   -- Chlorhexidine washing weekly for a while     -- Start mupirocin at first sign of these spots coming back     -- EMG of arms   -- Ortho consult      Signed, Adebayo Dubois MD, FAAP, FACP  Internal Medicine & Pediatrics

## 2021-02-26 NOTE — PATIENT INSTRUCTIONS
-- Bactrim x 1 week   -- Eat yogurt 1-2 times per day while on antibiotics (and for a few weeks after) to reduce the chances of diarrhea   -- Mupirocin twice daily until healed   -- Don't pick at it   -- Chlorhexidine washing weekly for a while     -- Start mupirocin at first sign of these spots coming back     -- EMG of arms   -- Ortho consult

## 2021-02-26 NOTE — NURSING NOTE
"Chief Complaint   Patient presents with     Sore     On left arm      Patient is here for sores on his left arm that he has had for about 1 month     Initial /70 (BP Location: Right arm, Patient Position: Sitting, Cuff Size: Adult Regular)   Pulse 78   Temp 97.2  F (36.2  C) (Tympanic)   Resp 16   Wt 67.4 kg (148 lb 11.2 oz)   SpO2 98%   BMI 22.61 kg/m   Estimated body mass index is 22.61 kg/m  as calculated from the following:    Height as of 6/19/20: 1.727 m (5' 8\").    Weight as of this encounter: 67.4 kg (148 lb 11.2 oz).  Medication Reconciliation: complete    Marilyn Mesa MA  "

## 2021-03-11 DIAGNOSIS — Z79.4 TYPE 2 DIABETES MELLITUS WITH HYPERGLYCEMIA, WITH LONG-TERM CURRENT USE OF INSULIN (H): ICD-10-CM

## 2021-03-11 DIAGNOSIS — E11.65 TYPE 2 DIABETES MELLITUS WITH HYPERGLYCEMIA, WITH LONG-TERM CURRENT USE OF INSULIN (H): ICD-10-CM

## 2021-03-11 RX ORDER — PROCHLORPERAZINE 25 MG/1
SUPPOSITORY RECTAL
Qty: 1 EACH | Refills: 3 | Status: SHIPPED | OUTPATIENT
Start: 2021-03-11 | End: 2022-02-03

## 2021-03-11 RX ORDER — PROCHLORPERAZINE 25 MG/1
SUPPOSITORY RECTAL
Qty: 9 EACH | Refills: 3 | Status: SHIPPED | OUTPATIENT
Start: 2021-03-11 | End: 2022-02-03

## 2021-03-11 NOTE — TELEPHONE ENCOUNTER
[facility] sent Rx request for the following:   Continuous Blood Gluc Sensor (DEXCOM G6 SENSOR) MISC  Sig: CHANGE EVERY 10 DAYS    Last Prescription Date:   03/01/2020 end date 02/26/2021  Last Fill Qty/Refills:        9 each- R-3  This Order Has Been Discontinued    Order Status Reason By On   Discontinued Alternate therapy Marilyn Mesa MA 2/26/21 1023       Continuous Blood Gluc Transmit (DEXCOM G6 TRANSMITTER) MISC  Sig: CHANGE EVERY 3 MONTHS    Last Prescription Date:    3/1/2020 end date 2/26/2021  Last Fill Qty/Refills:         1 each , 3  Last Office Visit:              11/27/2020 (murray)   Future Office visit:           None     This Order Has Been Discontinued    Order Status Reason By On   Discontinued Alternate therapy Marilyn Mesa MA 2/26/21 1023   Call made to patient. Verified patient is still using Decom device and in need of supplies. Routing to Provider for RX review. Unable to complete prescription refill per RN Medication Refill Policy.................... Meghna Bonilla RN ....................  3/11/2021   3:39 PM

## 2021-03-15 ENCOUNTER — OFFICE VISIT (OUTPATIENT)
Dept: ORTHOPEDICS | Facility: OTHER | Age: 51
End: 2021-03-15
Attending: INTERNAL MEDICINE
Payer: COMMERCIAL

## 2021-03-15 VITALS
SYSTOLIC BLOOD PRESSURE: 138 MMHG | HEIGHT: 68 IN | BODY MASS INDEX: 22.43 KG/M2 | WEIGHT: 148 LBS | OXYGEN SATURATION: 99 % | HEART RATE: 85 BPM | DIASTOLIC BLOOD PRESSURE: 88 MMHG | RESPIRATION RATE: 18 BRPM

## 2021-03-15 DIAGNOSIS — G56.21 CUBITAL TUNNEL SYNDROME ON RIGHT: ICD-10-CM

## 2021-03-15 DIAGNOSIS — G56.02 CARPAL TUNNEL SYNDROME OF LEFT WRIST: ICD-10-CM

## 2021-03-15 PROCEDURE — G0463 HOSPITAL OUTPT CLINIC VISIT: HCPCS

## 2021-03-15 PROCEDURE — 99203 OFFICE O/P NEW LOW 30 MIN: CPT | Performed by: ORTHOPAEDIC SURGERY

## 2021-03-15 ASSESSMENT — PAIN SCALES - GENERAL: PAINLEVEL: SEVERE PAIN (6)

## 2021-03-15 ASSESSMENT — MIFFLIN-ST. JEOR: SCORE: 1500.82

## 2021-03-15 NOTE — PROGRESS NOTES
"Chief Complaint   Patient presents with     Consult     right cubital/ left carpal - emg today       Initial /88 (BP Location: Right arm, Patient Position: Sitting, Cuff Size: Adult Regular)   Pulse 85   Resp 18   Ht 1.727 m (5' 8\")   Wt 67.1 kg (148 lb)   SpO2 99%   BMI 22.50 kg/m   Estimated body mass index is 22.5 kg/m  as calculated from the following:    Height as of this encounter: 1.727 m (5' 8\").    Weight as of this encounter: 67.1 kg (148 lb).  Medication Reconciliation: complete    Cherelle Myers LPN    "

## 2021-03-15 NOTE — PROGRESS NOTES
Visit Date:   03/15/2021      REASON FOR VISIT:  This is a 51-year-old gentleman with bilateral hand numbness and tingling.      HISTORY OF PRESENT ILLNESS:  Rick Simpson is a 51-year-old gentleman with significant numbness and tingling in both of his hands, per his report.  He states that he has numbness and tingling primarily at night.  It does not bother him during the day.  Actually, his symptoms have dropped off considerably since he stopped doing his latch hook hobby that he is fairly into.  He stopped doing now and most of his symptoms are gone.  He has an EMG scheduled with Dr. Varner in a month.      PAST MEDICAL HISTORY:  Significant for type 2 diabetes.  He also has hypothyroidism, anxiety.      SURGICAL HISTORY:  Significant for herniorrhaphy, dental extractions, vasectomy in the past.      ALLERGIES:  INCLUDE LISINOPRIL AND METFORMIN.      PHYSICAL EXAMINATION:  On examination today, this is a 51-year-old gentleman in no acute distress, very pleasant on examination.  His hands appear completely benign.  He has normal strength with intrinsics and no significant muscle wasting of the intrinsic or the thenar/hypothenar eminences.  He is neuro and vascularly intact today without significant concern.      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  At this point, his symptoms have dropped off nearly completely, given his laying off of the latch hook.  He was diagnosed previously with right cubital and left carpal tunnel.  We are going to go ahead and get the EMG, and then I will call him when we get the results of his EMG and discuss any kind of surgical intervention if indicated.         KAMLESH LIVINGSTON MD             D: 03/15/2021   T: 03/15/2021   MT: DESIREE      Name:     RICK SIMPSON   MRN:      [id number]        Account:      [id number]   :      1970           Visit Date:   03/15/2021      Document: [id number]

## 2021-04-07 ENCOUNTER — TELEPHONE (OUTPATIENT)
Dept: PEDIATRICS | Facility: OTHER | Age: 51
End: 2021-04-07

## 2021-04-07 DIAGNOSIS — E11.65 TYPE 2 DIABETES MELLITUS WITH HYPERGLYCEMIA, WITH LONG-TERM CURRENT USE OF INSULIN (H): Primary | ICD-10-CM

## 2021-04-07 DIAGNOSIS — Z79.4 TYPE 2 DIABETES MELLITUS WITH HYPERGLYCEMIA, WITH LONG-TERM CURRENT USE OF INSULIN (H): Primary | ICD-10-CM

## 2021-04-07 NOTE — TELEPHONE ENCOUNTER
Routing refill request to provider for review/approval because:  Drug not on the [facility] refill protocol     LOV: 2/26/2021  Called and spoke with Christal Munson at [facility] and she relayed below supplies needed as patient is new to diabetic pump.    Michelle out of office this week.  Instructions set up please review and sign if correct   Rosa Pickard RN on 4/7/2021 at 3:22 PM

## 2021-04-07 NOTE — TELEPHONE ENCOUNTER
"Pt is new and asked for 43\" instead of 23. on the [employer]  30 inf set 13 mm 43 inch. I did not see it on the med list  "

## 2021-04-08 RX ORDER — INSULIN PUMP CARTRIDGE
1 CARTRIDGE (EA) SUBCUTANEOUS
Qty: 10 EACH | Refills: 3 | Status: SHIPPED | OUTPATIENT
Start: 2021-04-08 | End: 2021-09-17

## 2021-04-08 RX ORDER — INFUSION SET FOR INSULIN PUMP
1 INFUSION SETS-PARAPHERNALIA MISCELLANEOUS
Qty: 10 EACH | Refills: 3 | Status: SHIPPED | OUTPATIENT
Start: 2021-04-08 | End: 2022-02-03

## 2021-04-20 ENCOUNTER — TELEPHONE (OUTPATIENT)
Dept: PEDIATRICS | Facility: OTHER | Age: 51
End: 2021-04-20

## 2021-04-20 DIAGNOSIS — Z79.4 TYPE 2 DIABETES MELLITUS WITH HYPERGLYCEMIA, WITH LONG-TERM CURRENT USE OF INSULIN (H): Primary | Chronic | ICD-10-CM

## 2021-04-20 DIAGNOSIS — E11.65 TYPE 2 DIABETES MELLITUS WITH HYPERGLYCEMIA, WITH LONG-TERM CURRENT USE OF INSULIN (H): Primary | Chronic | ICD-10-CM

## 2021-04-20 NOTE — TELEPHONE ENCOUNTER
ACC Review   Please call Mr. Rick Contreras.      ICD-10-CM    1. Type 2 diabetes mellitus with hyperglycemia, with long-term current use of insulin (H)  E11.65 Basic metabolic panel    Z79.4 Hemoglobin A1c        -- Schedule a lab only visit   -- Obtain up to date eye exam report   -- Schedule a diabetes medication management visit, in person or video    Signed, Adebayo Dubois MD, FAAP, FACP  Internal Medicine & Pediatrics

## 2021-04-20 NOTE — LETTER
April 21, 2021      Rick Contreras  [address]   [place] [address]      Your healthcare team cares about your health. To provide you with the best care,   we have reviewed your chart and based on our findings, we see that you are due to:     - DIABETES FOLLOW UP: Schedule a diabetic follow up appointment as a Office Visit. Patients with diabetes should generally see their provider every 3-6 months.    Schedule a DIABETIC EYE EXAM.  This exam is done with an optometrist. You can schedule this appointment with your eye doctor.  If you need a referral, please let us know.    If you have already completed these items, please contact the clinic via phone or   [facility] so your care team can review and update your records. Thank you for   choosing [facility] for your healthcare needs. For any questions,   concerns, or to schedule an appointment please contact the clinic.       Healthy Regards,      Your [facility] Care Team          Enclosure: N/A

## 2021-04-28 DIAGNOSIS — E03.9 ACQUIRED HYPOTHYROIDISM: ICD-10-CM

## 2021-04-28 RX ORDER — LEVOTHYROXINE SODIUM 100 UG/1
TABLET ORAL
Qty: 90 TABLET | Refills: 0 | Status: SHIPPED | OUTPATIENT
Start: 2021-04-28 | End: 2021-07-14

## 2021-04-28 NOTE — TELEPHONE ENCOUNTER
Prescription approved per [facility] Refill Protocol.  LOV: and labs: 11/27/2020    Rosa Pickard RN on 4/28/2021 at 2:58 PM

## 2021-06-19 DIAGNOSIS — M51.26 HERNIATED LUMBAR INTERVERTEBRAL DISC: ICD-10-CM

## 2021-06-21 RX ORDER — MELOXICAM 7.5 MG/1
TABLET ORAL
Qty: 180 TABLET | Refills: 0 | Status: SHIPPED | OUTPATIENT
Start: 2021-06-21 | End: 2021-12-23

## 2021-07-12 DIAGNOSIS — E03.9 ACQUIRED HYPOTHYROIDISM: ICD-10-CM

## 2021-07-14 RX ORDER — LEVOTHYROXINE SODIUM 100 UG/1
TABLET ORAL
Qty: 90 TABLET | Refills: 0 | Status: SHIPPED | OUTPATIENT
Start: 2021-07-14 | End: 2021-10-18

## 2021-07-14 NOTE — TELEPHONE ENCOUNTER
Prescription approved per [facility] Refill Protocol.  LOV: 2/26/2021  Last TSH: 11/27/2020    Rosa Pickard RN on 7/14/2021 at 11:34 AM

## 2021-09-15 DIAGNOSIS — Z79.4 TYPE 2 DIABETES MELLITUS WITH HYPERGLYCEMIA, WITH LONG-TERM CURRENT USE OF INSULIN (H): ICD-10-CM

## 2021-09-15 DIAGNOSIS — E11.65 TYPE 2 DIABETES MELLITUS WITH HYPERGLYCEMIA, WITH LONG-TERM CURRENT USE OF INSULIN (H): ICD-10-CM

## 2021-09-17 RX ORDER — INSULIN PUMP CARTRIDGE
CARTRIDGE (EA) SUBCUTANEOUS
Qty: 10 EACH | Refills: 3 | Status: SHIPPED | OUTPATIENT
Start: 2021-09-17 | End: 2022-01-27

## 2021-10-16 DIAGNOSIS — E03.9 ACQUIRED HYPOTHYROIDISM: ICD-10-CM

## 2021-10-18 RX ORDER — LEVOTHYROXINE SODIUM 100 UG/1
TABLET ORAL
Qty: 90 TABLET | Refills: 0 | Status: SHIPPED | OUTPATIENT
Start: 2021-10-18 | End: 2021-12-24

## 2021-12-23 DIAGNOSIS — E03.9 ACQUIRED HYPOTHYROIDISM: ICD-10-CM

## 2021-12-24 RX ORDER — LEVOTHYROXINE SODIUM 100 UG/1
TABLET ORAL
Qty: 90 TABLET | Refills: 0 | Status: SHIPPED | OUTPATIENT
Start: 2021-12-24 | End: 2022-02-03

## 2021-12-24 NOTE — TELEPHONE ENCOUNTER
Routing refill request to provider for review/approval because:  Labs not current:  TSH    LOV: 2/26/2021  Dr. Dubois out of office will route to covering provider for review and consideration.  Rosa Pickard RN on 12/24/2021 at 11:13 AM

## 2021-12-24 NOTE — TELEPHONE ENCOUNTER
I will give a 90-day refill. However patient needs to be seen as his most recent thyroid studies have been greater than 1 year. He will need to schedule an appointment for follow-up and recheck of his thyroid levels.

## 2021-12-28 NOTE — TELEPHONE ENCOUNTER
Called and verified patient full name and . Notified patient of RX. States he will call back.     Marilyn Villanueva LPN on 2021 at 8:39 AM

## 2022-01-26 DIAGNOSIS — Z79.4 TYPE 2 DIABETES MELLITUS WITH HYPERGLYCEMIA, WITH LONG-TERM CURRENT USE OF INSULIN (H): ICD-10-CM

## 2022-01-26 DIAGNOSIS — E11.65 TYPE 2 DIABETES MELLITUS WITH HYPERGLYCEMIA, WITH LONG-TERM CURRENT USE OF INSULIN (H): ICD-10-CM

## 2022-01-27 RX ORDER — INSULIN PUMP CARTRIDGE
CARTRIDGE (EA) SUBCUTANEOUS
Qty: 10 EACH | Refills: 3 | Status: SHIPPED | OUTPATIENT
Start: 2022-01-27 | End: 2022-02-09

## 2022-02-01 DIAGNOSIS — Z79.4 TYPE 2 DIABETES MELLITUS WITH HYPERGLYCEMIA, WITH LONG-TERM CURRENT USE OF INSULIN (H): ICD-10-CM

## 2022-02-01 DIAGNOSIS — E11.65 TYPE 2 DIABETES MELLITUS WITH HYPERGLYCEMIA, WITH LONG-TERM CURRENT USE OF INSULIN (H): ICD-10-CM

## 2022-02-01 NOTE — TELEPHONE ENCOUNTER
[facility] sent Rx request for the following:   NOVOLOG VIAL 100 UNIT/ML soln  Sig: USE PER INSULIN PUMP: BASAL: 0.4 UNIT/HOUR, BOLUS: ICR 1:15 GRAMS, ISF 1:100 MG/DL. MAX DAILY DOSE 40 UNITS    Last Prescription Date:   01/27/2021  Last Fill Qty/Refills:         40 mL, R-3    Last Office Visit:              02/26/2021 (Silvino)   Future Office visit:           02/03/2022 (Silvino)   Short Acting Insulin Protocol Failed - 2/1/2022  1:22 PM        Failed - Serum creatinine on file in past 12 months     Recent Labs   Lab Test 11/27/20  1537   CR 1.25       Ok to refill medication if creatinine is low          Failed - HgbA1C in past 3 or 6 months     If HgbA1C is 8 or greater, it needs to be on file within the past 3 months.  If less than 8, must be on file within the past 6 months.     Recent Labs   Lab Test 11/27/20  1537 03/12/18  0830 10/20/17  1055   A1C 10.1*   < >  --    [id number]  --   --  8.4*    < > = values in this interval not displayed.          Patient to have upcoming appointment. Will need refill prior.   Unable to complete prescription refill per RN Medication Refill Policy.................... Vee Jackson RN ....................  2/1/2022   4:25 PM

## 2022-02-03 ENCOUNTER — IMMUNIZATION (OUTPATIENT)
Dept: FAMILY MEDICINE | Facility: OTHER | Age: 52
End: 2022-02-03
Attending: FAMILY MEDICINE
Payer: COMMERCIAL

## 2022-02-03 ENCOUNTER — OFFICE VISIT (OUTPATIENT)
Dept: PEDIATRICS | Facility: OTHER | Age: 52
End: 2022-02-03
Attending: INTERNAL MEDICINE
Payer: COMMERCIAL

## 2022-02-03 VITALS
SYSTOLIC BLOOD PRESSURE: 130 MMHG | HEIGHT: 68 IN | BODY MASS INDEX: 21.41 KG/M2 | OXYGEN SATURATION: 97 % | DIASTOLIC BLOOD PRESSURE: 88 MMHG | TEMPERATURE: 98.4 F | WEIGHT: 141.3 LBS | HEART RATE: 86 BPM | RESPIRATION RATE: 16 BRPM

## 2022-02-03 DIAGNOSIS — E03.9 ACQUIRED HYPOTHYROIDISM: ICD-10-CM

## 2022-02-03 DIAGNOSIS — Z13.220 SCREENING FOR HYPERLIPIDEMIA: ICD-10-CM

## 2022-02-03 DIAGNOSIS — E11.65 TYPE 2 DIABETES MELLITUS WITH HYPERGLYCEMIA, WITH LONG-TERM CURRENT USE OF INSULIN (H): Primary | ICD-10-CM

## 2022-02-03 DIAGNOSIS — Z23 NEED FOR VACCINATION: ICD-10-CM

## 2022-02-03 DIAGNOSIS — Z79.4 TYPE 2 DIABETES MELLITUS WITH HYPERGLYCEMIA, WITH LONG-TERM CURRENT USE OF INSULIN (H): Primary | ICD-10-CM

## 2022-02-03 DIAGNOSIS — K21.00 GASTROESOPHAGEAL REFLUX DISEASE WITH ESOPHAGITIS, UNSPECIFIED WHETHER HEMORRHAGE: ICD-10-CM

## 2022-02-03 DIAGNOSIS — Z78.9 ALCOHOL USE: ICD-10-CM

## 2022-02-03 DIAGNOSIS — F39 MOOD DISORDER (H): ICD-10-CM

## 2022-02-03 PROBLEM — F10.90 ALCOHOL USE: Status: ACTIVE | Noted: 2022-02-03

## 2022-02-03 LAB
ALBUMIN SERPL-MCNC: 4.6 G/DL (ref 3.5–5.7)
ALP SERPL-CCNC: 58 U/L (ref 34–104)
ALT SERPL W P-5'-P-CCNC: 41 U/L (ref 7–52)
ANION GAP SERPL CALCULATED.3IONS-SCNC: 5 MMOL/L (ref 3–14)
AST SERPL W P-5'-P-CCNC: 24 U/L (ref 13–39)
BILIRUB SERPL-MCNC: 0.8 MG/DL (ref 0.3–1)
BUN SERPL-MCNC: 16 MG/DL (ref 7–25)
CALCIUM SERPL-MCNC: 9.8 MG/DL (ref 8.6–10.3)
CHLORIDE BLD-SCNC: 102 MMOL/L (ref 98–107)
CHOLEST SERPL-MCNC: 135 MG/DL
CO2 SERPL-SCNC: 28 MMOL/L (ref 21–31)
CREAT SERPL-MCNC: 1.12 MG/DL (ref 0.7–1.3)
ERYTHROCYTE [DISTWIDTH] IN BLOOD BY AUTOMATED COUNT: 13 % (ref 10–15)
FASTING STATUS PATIENT QL REPORTED: NORMAL
GFR SERPL CREATININE-BSD FRML MDRD: 80 ML/MIN/1.73M2
GLUCOSE BLD-MCNC: 332 MG/DL (ref 70–105)
HBA1C MFR BLD: 9.7 % (ref 4–6.2)
HCT VFR BLD AUTO: 44 % (ref 40–53)
HDLC SERPL-MCNC: 57 MG/DL (ref 23–92)
HGB BLD-MCNC: 14.7 G/DL (ref 13.3–17.7)
LDLC SERPL CALC-MCNC: 62 MG/DL
MCH RBC QN AUTO: 29.6 PG (ref 26.5–33)
MCHC RBC AUTO-ENTMCNC: 33.4 G/DL (ref 31.5–36.5)
MCV RBC AUTO: 89 FL (ref 78–100)
NONHDLC SERPL-MCNC: 78 MG/DL
PLATELET # BLD AUTO: 378 10E3/UL (ref 150–450)
POTASSIUM BLD-SCNC: 4.4 MMOL/L (ref 3.5–5.1)
PROT SERPL-MCNC: 7.1 G/DL (ref 6.4–8.9)
RBC # BLD AUTO: 4.96 10E6/UL (ref 4.4–5.9)
SODIUM SERPL-SCNC: 135 MMOL/L (ref 134–144)
TRIGL SERPL-MCNC: 82 MG/DL
TSH SERPL DL<=0.005 MIU/L-ACNC: 2.7 MU/L (ref 0.4–4)
WBC # BLD AUTO: 7 10E3/UL (ref 4–11)

## 2022-02-03 PROCEDURE — 80053 COMPREHEN METABOLIC PANEL: CPT | Mod: ZL | Performed by: INTERNAL MEDICINE

## 2022-02-03 PROCEDURE — G0463 HOSPITAL OUTPT CLINIC VISIT: HCPCS

## 2022-02-03 PROCEDURE — 83036 HEMOGLOBIN GLYCOSYLATED A1C: CPT | Mod: ZL | Performed by: INTERNAL MEDICINE

## 2022-02-03 PROCEDURE — 36415 COLL VENOUS BLD VENIPUNCTURE: CPT | Mod: ZL | Performed by: INTERNAL MEDICINE

## 2022-02-03 PROCEDURE — 99214 OFFICE O/P EST MOD 30 MIN: CPT | Performed by: INTERNAL MEDICINE

## 2022-02-03 PROCEDURE — 85027 COMPLETE CBC AUTOMATED: CPT | Mod: ZL | Performed by: INTERNAL MEDICINE

## 2022-02-03 PROCEDURE — G0008 ADMIN INFLUENZA VIRUS VAC: HCPCS

## 2022-02-03 PROCEDURE — 84443 ASSAY THYROID STIM HORMONE: CPT | Mod: ZL | Performed by: INTERNAL MEDICINE

## 2022-02-03 PROCEDURE — 0054A COVID-19,PF,PFIZER (12+ YRS): CPT

## 2022-02-03 PROCEDURE — 80061 LIPID PANEL: CPT | Mod: ZL | Performed by: INTERNAL MEDICINE

## 2022-02-03 PROCEDURE — G0463 HOSPITAL OUTPT CLINIC VISIT: HCPCS | Mod: 25

## 2022-02-03 RX ORDER — SIMVASTATIN 20 MG
20 TABLET ORAL AT BEDTIME
Qty: 90 TABLET | Refills: 4 | Status: SHIPPED | OUTPATIENT
Start: 2022-02-03 | End: 2022-11-14

## 2022-02-03 RX ORDER — PROCHLORPERAZINE 25 MG/1
SUPPOSITORY RECTAL
Qty: 9 EACH | Refills: 3 | Status: SHIPPED | OUTPATIENT
Start: 2022-02-03 | End: 2023-02-22

## 2022-02-03 RX ORDER — ZOLPIDEM TARTRATE 10 MG/1
10 TABLET ORAL DAILY
COMMUNITY
Start: 2022-01-19

## 2022-02-03 RX ORDER — BUPROPION HYDROCHLORIDE 100 MG/1
100 TABLET, EXTENDED RELEASE ORAL DAILY
Qty: 90 TABLET | Refills: 3 | COMMUNITY
Start: 2022-02-03 | End: 2023-05-22

## 2022-02-03 RX ORDER — PROCHLORPERAZINE 25 MG/1
SUPPOSITORY RECTAL
Qty: 1 EACH | Refills: 3 | Status: SHIPPED | OUTPATIENT
Start: 2022-02-03 | End: 2023-03-28

## 2022-02-03 RX ORDER — INFUSION SET FOR INSULIN PUMP
1 INFUSION SETS-PARAPHERNALIA MISCELLANEOUS
Qty: 10 EACH | Refills: 3 | Status: SHIPPED | OUTPATIENT
Start: 2022-02-03 | End: 2022-02-03

## 2022-02-03 RX ORDER — SYRINGE-NEEDLE,INSULIN,0.5 ML 27GX1/2"
SYRINGE, EMPTY DISPOSABLE MISCELLANEOUS
Qty: 100 EACH | Refills: 1 | Status: SHIPPED | OUTPATIENT
Start: 2022-02-03

## 2022-02-03 RX ORDER — INFUSION SET FOR INSULIN PUMP
1 INFUSION SETS-PARAPHERNALIA MISCELLANEOUS
Qty: 10 EACH | Refills: 3 | Status: SHIPPED | OUTPATIENT
Start: 2022-02-03 | End: 2022-02-09

## 2022-02-03 RX ORDER — FAMOTIDINE 20 MG/1
20 TABLET, FILM COATED ORAL 2 TIMES DAILY PRN
Qty: 180 TABLET | Refills: 3 | Status: SHIPPED | OUTPATIENT
Start: 2022-02-03 | End: 2022-11-14

## 2022-02-03 RX ORDER — LEVOTHYROXINE SODIUM 100 UG/1
100 TABLET ORAL DAILY
Qty: 90 TABLET | Refills: 3 | Status: SHIPPED | OUTPATIENT
Start: 2022-02-03 | End: 2022-11-14

## 2022-02-03 RX ORDER — BUPROPION HYDROCHLORIDE 150 MG/1
1 TABLET ORAL DAILY
COMMUNITY
Start: 2022-02-01 | End: 2023-03-20

## 2022-02-03 ASSESSMENT — MIFFLIN-ST. JEOR: SCORE: 1470.43

## 2022-02-03 ASSESSMENT — PAIN SCALES - GENERAL: PAINLEVEL: SEVERE PAIN (6)

## 2022-02-03 NOTE — NURSING NOTE
"Chief Complaint   Patient presents with     Diabetes         Initial /88   Pulse 86   Temp 98.4  F (36.9  C) (Tympanic)   Resp 16   Ht 1.727 m (5' 8\")   Wt 64.1 kg (141 lb 4.8 oz)   SpO2 97%   BMI 21.48 kg/m   Estimated body mass index is 21.48 kg/m  as calculated from the following:    Height as of this encounter: 1.727 m (5' 8\").    Weight as of this encounter: 64.1 kg (141 lb 4.8 oz).         Norma J. Gosselin, LPN   "

## 2022-02-03 NOTE — PROGRESS NOTES
"Assessment & Plan   1. Type 2 diabetes mellitus with hyperglycemia, with long-term current use of insulin (H)  Diabetes has been uncontrolled.  Since he is using insulin pump and CGM I recommend close follow-up with Michelle of diabetes education for titration of insulin.  I offered endocrinology consultation which the patient declined.  Lifestyle modification is recommended including reduced carbohydrate intake, daily exercise.  - Continuous Blood Gluc Sensor (DEXCOM G6 SENSOR) MISC; CHANGE EVERY 10 DAYS  Dispense: 9 each; Refill: 3  - Continuous Blood Gluc Transmit (DEXCOM G6 TRANSMITTER) MISC; CHANGE EVERY 3 MONTHS  Dispense: 1 each; Refill: 3  - insulin syringe-needle U-100 (31G X 5/16\" 1 ML) 31G X 5/16\" 1 ML miscellaneous; Use 4 syringes daily or as directed.  Dispense: 100 each; Refill: 1  - IV Sets-Tubing (INFUSION SET 43\") MISC; 43\" 13 mm auto soft 30 .Change every 2-3 days  Dispense: 25 each; Refill: 3  - Miscellaneous DME Order  - Hemoglobin A1c; Future  - simvastatin (ZOCOR) 20 MG tablet; Take 1 tablet (20 mg) by mouth At Bedtime  Dispense: 90 tablet; Refill: 4  - Comprehensive metabolic panel; Future  - Hemoglobin A1c  - Comprehensive metabolic panel  - Insulin Infusion Pump Supplies (AUTOSOFT 30 INFUSION SET) MISC; 1 each every 3 days  Dispense: 10 each; Refill: 3    2. Acquired hypothyroidism  At goal, no change.  - levothyroxine (EUTHYROX) 100 MCG tablet; Take 1 tablet (100 mcg) by mouth daily  Dispense: 90 tablet; Refill: 3  - TSH; Future  - TSH    3. Screening for hyperlipidemia  - Lipid Profile; Future  - Lipid Profile    4. Gastroesophageal reflux disease with esophagitis, unspecified whether hemorrhage  - famotidine (PEPCID) 20 MG tablet; Take 1 tablet (20 mg) by mouth 2 times daily as needed (gerd)  Dispense: 180 tablet; Refill: 3    5. Alcohol use  I recommend alcohol cessation  - CBC with platelets; Future  - Comprehensive metabolic panel; Future  - CBC with platelets  - Comprehensive metabolic " panel    6. Need for vaccination  - INFLUENZA QUAD, RECOMBINANT, P-FREE (RIV4) (FLUBLOK) [[id number]]    7. Mood disorder (H)  - buPROPion (WELLBUTRIN SR) 100 MG 12 hr tablet; Take 1 tablet (100 mg) by mouth daily Take 1 tablet daily  Dispense: 90 tablet; Refill: 3        Patient Instructions      -- Recommend follow-up with Michelle, plan for frequent check-in with her until glucoses under control   -- Consider Endocrinology consult    Aspects of Diabetes we can improve:  Hemoglobin A1c Lab Results   Component Value Date    A1C 10.1 11/27/2020    A1C 9.4 02/13/2020    A1C 10.3 05/31/2019    A1C 9.3 11/01/2018    A1C 7.1 03/12/2018    Goal range is under 8. Best is 6.5 to 7   Blood Pressure 130/88 Goal to keep less than 140/90   Tobacco  reports that he quit smoking about 6 years ago. His smoking use included cigarettes. He has a 10.00 pack-year smoking history. He has never used smokeless tobacco. Goal to abstain from tobacco   Aspirin yes Aspirin reduces risk of heart disease and stroke   ACE/ARB intolerant These medications reduce risk of kidney disease   Cholesterol simvastatin Statins reduce risk of heart disease and stroke   Eye Exam DUE Annual diabetic eye exam   Healthy weight Body mass index is 21.48 kg/m . Goal BMI under 30, best is under 25.      -- I'm trying to exercise daily (goal at least 20 min/day) with moderate aerobic activity   -- Eat healthy (resources from ADA at http://www.diabetes.org/)   -- I'm taking good care of my feet. Consider seeing the Podiatrist   -- Check blood sugars as directed, record in log book and bring to every appointment   -- Goal sugar before breakfast: under 140   -- Goal sugar 2 hours after supper: under 170   -- Next diabetes lab draw: 3 months   -- Next diabetes office visit: 3 months     -- Follow-up with Diamond MEADE   -- See a therapist   -- Consider attending AA   -- You should quit drinking    [place] counselors/therapists   Telephone Hours Kids? Address   [facility]  Counseling  (Many counselors) [phone] M-Th 8-5  F 8-12 Yes [address]   [email or web address]   [facility]  (Many counselors) [phone]  Yes [address]   [email or web address]   [facility]  (Many counselors) [phone]  Yes [address]  http://[email or web address]   [facility]  Mina Wood [phone]  Yes [facility]  [address]  [email or web address]   [facility]  Micha Valentino [phone]  Yes [address]   [address]  [email or web address]   Miri Pate [phone]   [address]  [email or web address]   [facility]  Phani Romero [phone]   [address]  [email or web address]://[email or web address]/   Louise Haaspaul [phone]   [address]   Christal Gonzalez [phone]   [address] [place] [address]   Leighann Pickard [phone]  Yes [address]   Teja Severiano [phone]   [address] [place]    Nam Mari [phone]   [address] [place] [address]   Arabella Patterson [phone]   [address] [place] [address]   [email or web address]   Denae Wright [phone]   [address] [place] [address] [address]  ([facility])  [email or web address]   [facility]  Carter Madera [phone]   [address] [facility]  Michele Rinne Cindy Thomas [phone]      [facility]: Kwadwo [phone]  Yes KARISSA Burkett  [address]  [email or web address]://[email or web address]/   [facility]: [place] [phone]  Yes KARISSA Ragland  [address] [facility]  [email or web address]://[email or web address]           Return in about 3 months (around 5/3/2022) for diabetes.    Signed, Adebayo Dubois MD, FAAP, FACP  Internal Medicine & Pediatrics    Subjective   Rick Contreras is a 51 year old male who presents for diabetes  "check.  He is using his Dexcom.  His blood sugars are all in the 200-300 range.  He knows he needs to cut back on how much alcohol he uses.  He started drinking more alcohol when his dad was sick and then passed away.  He has not seen Michelle from diabetes education recently.    Objective   Vitals: /88   Pulse 86   Temp 98.4  F (36.9  C) (Tympanic)   Resp 16   Ht 1.727 m (5' 8\")   Wt 64.1 kg (141 lb 4.8 oz)   SpO2 97%   BMI 21.48 kg/m    Foot Exam:  2/3/2022  Intact to monofilament bilaterally.  Skin intact without erythema.    Review and Analysis of Data   I personally reviewed the following:  External notes: No  Results: Yes Most recent diabetic labs are reviewed  Use of an independent historian: No  Independent review of a test performed by another physician: No  Discussion of management with another physician: No  High risk of morbidity from additional diagnostic testing and/or treatment.    Patient currently uses a Dexcom G6 CGM for continuous monitoring of glucose.   Patient injects or boluses insulin 3 or more times daily before breakfast, before lunch, before dinner, and bedtime.  Patient requires frequent adjustments to their insulin treatment regimen on the basis of therapeutic CGM testing results.  DME (Durable Medical Equipment) Orders and Documentation  Orders Placed This Encounter   Procedures     Miscellaneous DME Order      The patient was assessed and it was determined the patient is in need of the following listed DME Supplies/Equipment. Please complete supporting documentation below to demonstrate medical necessity.      DME All Other Item(s) Documentation    List reason for need and supporting documentation for medical necessity below for each DME item.     1.     ICD-10-CM    1. Type 2 diabetes mellitus with hyperglycemia, with long-term current use of insulin (H)  E11.65 Continuous Blood Gluc Sensor (DEXCOM G6 SENSOR) MISC    Z79.4 Continuous Blood Gluc Transmit (DEXCOM G6 " "TRANSMITTER) MISC     insulin syringe-needle U-100 (31G X 5/16\" 1 ML) 31G X 5/16\" 1 ML miscellaneous     IV Sets-Tubing (INFUSION SET 43\") MISC     Miscellaneous DME Order     Hemoglobin A1c     simvastatin (ZOCOR) 20 MG tablet     Comprehensive metabolic panel     Hemoglobin A1c     Comprehensive metabolic panel     Insulin Infusion Pump Supplies (AUTOSOFT 30 INFUSION SET) MISC     DISCONTINUED: Insulin Infusion Pump Supplies (AUTOSOFT 30 INFUSION SET) MISC   2. Acquired hypothyroidism  E03.9 levothyroxine (EUTHYROX) 100 MCG tablet     TSH     TSH   3. Screening for hyperlipidemia  Z13.220 Lipid Profile     Lipid Profile   4. Gastroesophageal reflux disease with esophagitis, unspecified whether hemorrhage  K21.00 famotidine (PEPCID) 20 MG tablet   5. Alcohol use  Z72.89 CBC with platelets     Comprehensive metabolic panel     CBC with platelets     Comprehensive metabolic panel   6. Need for vaccination  Z23 INFLUENZA QUAD, RECOMBINANT, P-FREE (RIV4) (FLUBLOK) [BGJ611]   7. Mood disorder (H)  F39 buPROPion (WELLBUTRIN SR) 100 MG 12 hr tablet               "

## 2022-02-03 NOTE — LETTER
February 3, 2022      Rick Contreras  [address]   [place] [address]        Dear ,    We are writing to inform you of your test results.    A1c remains very elevated.  Close follow-up with Michelle of Diabetes education and myself recommended. No change to our plan.    Signed, Adebayo Dubois MD, FAAP, FACP  Internal Medicine & Pediatrics      Resulted Orders   CBC with platelets   Result Value Ref Range    WBC Count 7.0 4.0 - 11.0 10e3/uL    RBC Count 4.96 4.40 - 5.90 10e6/uL    Hemoglobin 14.7 13.3 - 17.7 g/dL    Hematocrit 44.0 40.0 - 53.0 %    MCV 89 78 - 100 fL    MCH 29.6 26.5 - 33.0 pg    MCHC 33.4 31.5 - 36.5 g/dL    RDW 13.0 10.0 - 15.0 %    Platelet Count 378 150 - 450 10e3/uL   Hemoglobin A1c   Result Value Ref Range    Hemoglobin A1C 9.7 (H) 4.0 - 6.2 %   Lipid Profile   Result Value Ref Range    Cholesterol 135 <200 mg/dL    Triglycerides 82 <150 mg/dL    Direct Measure HDL 57 23 - 92 mg/dL    LDL Cholesterol Calculated 62 <=100 mg/dL    Non HDL Cholesterol 78 <130 mg/dL    Patient Fasting > 8hrs? Unknown     Narrative    Cholesterol  Desirable:  <200 mg/dL    Triglycerides  Normal:  Less than 150 mg/dL  Borderline High:  150-199 mg/dL  High:  200-499 mg/dL  Very High:  Greater than or equal to 500 mg/dL    Direct Measure HDL  Female:  Greater than or equal to 50 mg/dL   Male:  Greater than or equal to 40 mg/dL    LDL Cholesterol  Desirable:  <100mg/dL  Above Desirable:  100-129 mg/dL   Borderline High:  130-159 mg/dL   High:  160-189 mg/dL   Very High:  >= 190 mg/dL    Non HDL Cholesterol  Desirable:  130 mg/dL  Above Desirable:  130-159 mg/dL  Borderline High:  160-189 mg/dL  High:  190-219 mg/dL  Very High:  Greater than or equal to 220 mg/dL   TSH   Result Value Ref Range    TSH 2.70 0.40 - 4.00 mU/L   Comprehensive metabolic panel   Result Value Ref Range    Sodium 135 134 - 144 mmol/L    Potassium 4.4 3.5 - 5.1 mmol/L    Chloride 102 98 - 107 mmol/L    Carbon Dioxide (CO2) 28 21 - 31  mmol/L    Anion Gap 5 3 - 14 mmol/L    Urea Nitrogen 16 7 - 25 mg/dL    Creatinine 1.12 0.70 - 1.30 mg/dL    Calcium 9.8 8.6 - 10.3 mg/dL    Glucose 332 (H) 70 - 105 mg/dL    Alkaline Phosphatase 58 34 - 104 U/L    AST 24 13 - 39 U/L    ALT 41 7 - 52 U/L    Protein Total 7.1 6.4 - 8.9 g/dL    Albumin 4.6 3.5 - 5.7 g/dL    Bilirubin Total 0.8 0.3 - 1.0 mg/dL    GFR Estimate 80 >60 mL/min/1.73m2      Comment:      Effective December 21, 2021 eGFRcr in adults is calculated using the 2021 CKD-EPI creatinine equation which includes age and gender ( et al., NEJM, DOI: 10.1056/MSWEkp8489453)       If you have any questions or concerns, please call the clinic at the number listed above.       Sincerely,      Adebayo Dubois MD

## 2022-02-03 NOTE — PATIENT INSTRUCTIONS
-- Recommend follow-up with Michelle, plan for frequent check-in with her until glucoses under control   -- Consider Endocrinology consult    Aspects of Diabetes we can improve:  Hemoglobin A1c Lab Results   Component Value Date    A1C 10.1 11/27/2020    A1C 9.4 02/13/2020    A1C 10.3 05/31/2019    A1C 9.3 11/01/2018    A1C 7.1 03/12/2018    Goal range is under 8. Best is 6.5 to 7   Blood Pressure 130/88 Goal to keep less than 140/90   Tobacco  reports that he quit smoking about 6 years ago. His smoking use included cigarettes. He has a 10.00 pack-year smoking history. He has never used smokeless tobacco. Goal to abstain from tobacco   Aspirin yes Aspirin reduces risk of heart disease and stroke   ACE/ARB intolerant These medications reduce risk of kidney disease   Cholesterol simvastatin Statins reduce risk of heart disease and stroke   Eye Exam DUE Annual diabetic eye exam   Healthy weight Body mass index is 21.48 kg/m . Goal BMI under 30, best is under 25.      -- I'm trying to exercise daily (goal at least 20 min/day) with moderate aerobic activity   -- Eat healthy (resources from ADA at http://www.diabetes.org/)   -- I'm taking good care of my feet. Consider seeing the Podiatrist   -- Check blood sugars as directed, record in log book and bring to every appointment   -- Goal sugar before breakfast: under 140   -- Goal sugar 2 hours after supper: under 170   -- Next diabetes lab draw: 3 months   -- Next diabetes office visit: 3 months     -- Follow-up with Diamond MEADE   -- See a therapist   -- Consider attending AA   -- You should quit drinking    [place] counselors/therapists   Telephone Hours Kids? Address   [facility] Counseling  (Many counselors) [phone] M-Th 8-5  F 8-12 Yes [address]   [email or web address]://[email or web address]   [facility]  (Many counselors) [phone]  Yes [address]   [email or web address]://[email or web address]   [facility]  (Many counselors) [phone]   Yes [address]  http://[email or web address]   [facility]  Mina Wood [phone]  Yes [facility]  [address]  [email or web address]   [facility]  Micha Chicho [phone]  Yes [address]   [address]  [email or web address]   Miri Pate [phone]   [address]  [email or web address]   [facility]  Phani Romero [phone]   [address]  [email or web address]://[email or web address].[email or web address]Timeical.[facility]/   Louise Mirabrianapaul [phone]   [address]   Christal Lisa [phone]   [address] [place] [address]   Leighann Neeraj [phone]  Yes [address]   Teja العلي [phone]   [address] [place]    Nam Kamaljit [phone]   [address] [place] [address]   Arabella Patterson [phone]   [address]   [email or web address]   Denae Wright [phone]   [address] [place] [address] [address]  ([facility])  [email or web address]   [facility]  Carter [place] [phone]   [address] [facility]  Michele Rinne Cindy Thomas [phone]      [facility]: [place] [phone]  Yes KARISSA Burkett  [address]  [email or web address]   [facility]: [place] [phone]  Yes KARISSA Ragland  [address] [facility]  [email or web address]

## 2022-02-04 DIAGNOSIS — E11.65 TYPE 2 DIABETES MELLITUS WITH HYPERGLYCEMIA, WITH LONG-TERM CURRENT USE OF INSULIN (H): ICD-10-CM

## 2022-02-04 DIAGNOSIS — Z79.4 TYPE 2 DIABETES MELLITUS WITH HYPERGLYCEMIA, WITH LONG-TERM CURRENT USE OF INSULIN (H): ICD-10-CM

## 2022-02-04 RX ORDER — INSULIN PUMP CARTRIDGE
CARTRIDGE (EA) SUBCUTANEOUS
Qty: 10 EACH | Refills: 3 | OUTPATIENT
Start: 2022-02-04

## 2022-02-04 RX ORDER — INFUSION SET FOR INSULIN PUMP
1 INFUSION SETS-PARAPHERNALIA MISCELLANEOUS
Qty: 10 EACH | Refills: 3 | OUTPATIENT
Start: 2022-02-04

## 2022-02-04 NOTE — TELEPHONE ENCOUNTER
[facility] sent Rx request for the following:      Insulin Infusion Pump Supplies (AUTOSOFT 30 INFUSION SET) MISC 10 each 3 2/3/2022  No   Sig - Route: 1 each every 3 days - Does not apply   Sent to pharmacy as: AutoSoft 30 Infusion Set   Class: E-Prescribe   Order: [id number]   E-Prescribing Status: Receipt confirmed by pharmacy (2/3/2022  3:28 PM CST)     [id number] - [place], [place] - [address] [facility] [address]     Unable to complete prescription refill per RN Medication Refill Policy. Rebekah Ramirez RN .............. 2/4/2022  1:10 PM

## 2022-02-08 ENCOUNTER — TELEPHONE (OUTPATIENT)
Dept: EDUCATION SERVICES | Facility: OTHER | Age: 52
End: 2022-02-08
Payer: COMMERCIAL

## 2022-02-08 NOTE — TELEPHONE ENCOUNTER
"Patient shares he changed (increased) some of his basal settings, \"now my sugars are below 200.\"  Patient states his pump is acting up, \"I have to press the touch screen more than once to get my choice through.\"      Encouraged patient to call [employer] line, patient says he can see the phone number in his pump and will call to discuss the issue with [employer].    Encouraged patient to set up visit with [facility] to review pump settings to help bring HgA1c closer to target.  Patient notes he would like to do that and will set up visit with [facility] soon.    Michelle Crow RN, BSN, [facility]  2/8/2022 10:23 AM   "

## 2022-02-09 DIAGNOSIS — E11.65 TYPE 2 DIABETES MELLITUS WITH HYPERGLYCEMIA, WITH LONG-TERM CURRENT USE OF INSULIN (H): ICD-10-CM

## 2022-02-09 DIAGNOSIS — Z79.4 TYPE 2 DIABETES MELLITUS WITH HYPERGLYCEMIA, WITH LONG-TERM CURRENT USE OF INSULIN (H): ICD-10-CM

## 2022-02-09 RX ORDER — INSULIN PUMP CARTRIDGE
1 CARTRIDGE (EA) SUBCUTANEOUS
Qty: 10 EACH | Refills: 3 | Status: SHIPPED | OUTPATIENT
Start: 2022-02-09 | End: 2022-09-22

## 2022-02-09 RX ORDER — INFUSION SET FOR INSULIN PUMP
1 INFUSION SETS-PARAPHERNALIA MISCELLANEOUS
Qty: 10 EACH | Refills: 3 | Status: SHIPPED | OUTPATIENT
Start: 2022-02-09 | End: 2022-09-02

## 2022-02-09 NOTE — TELEPHONE ENCOUNTER
Routing refill request to provider for review/approval because:  Drug not on the [facility] refill protocol     LOV: 2/3/2022  Rosa Pickard RN on 2/9/2022 at 11:09 AM

## 2022-02-17 PROBLEM — E11.65 TYPE 2 DIABETES MELLITUS WITH HYPERGLYCEMIA (H): Chronic | Status: ACTIVE | Noted: 2019-05-31

## 2022-03-01 ENCOUNTER — TELEPHONE (OUTPATIENT)
Dept: PEDIATRICS | Facility: OTHER | Age: 52
End: 2022-03-01
Payer: COMMERCIAL

## 2022-03-01 DIAGNOSIS — Z79.4 TYPE 2 DIABETES MELLITUS WITH HYPERGLYCEMIA, WITH LONG-TERM CURRENT USE OF INSULIN (H): ICD-10-CM

## 2022-03-01 DIAGNOSIS — E11.65 TYPE 2 DIABETES MELLITUS WITH HYPERGLYCEMIA, WITH LONG-TERM CURRENT USE OF INSULIN (H): ICD-10-CM

## 2022-03-01 NOTE — TELEPHONE ENCOUNTER
Refer to note below. PCP is out of clinic . Will route to covering Provider for consideration.  NOVOLOG VIAL 100 UNIT/ML soln  Last Written Prescription Date: 02/01/2022  Last Fill Quantity: 10 ml,   # refills: 0  Last Office Visit: 02/03/2022  Future Office visit:       Routing refill request to provider for review/approval.     Unable to complete prescription refill per RNMedication Refill Policy.................... Maura Rodriguez RN ....................  3/1/2022   2:16 PM

## 2022-03-01 NOTE — TELEPHONE ENCOUNTER
Reason for call: Medication or medication refill    Name of medication requested: Novalog    Are you out of the medication? 2-3 more days     What pharmacy do you use? [facility]     Preferred method for responding to this message: Telephone Call    Phone number patient can be reached at: Home number on file [phone] (home)    If we cannot reach you directly, may we leave a detailed response at the number you provided? Yes     Kristen Mari on 3/1/2022 at 1:24 PM

## 2022-04-12 DIAGNOSIS — E11.65 TYPE 2 DIABETES MELLITUS WITH HYPERGLYCEMIA, WITH LONG-TERM CURRENT USE OF INSULIN (H): ICD-10-CM

## 2022-04-12 DIAGNOSIS — Z79.4 TYPE 2 DIABETES MELLITUS WITH HYPERGLYCEMIA, WITH LONG-TERM CURRENT USE OF INSULIN (H): ICD-10-CM

## 2022-04-13 NOTE — TELEPHONE ENCOUNTER
Routing refill request to provider for review/approval because:    LOV: 2/3/22    Rosa Pickard RN on 4/13/2022 at 11:48 AM

## 2022-04-14 RX ORDER — INSULIN ASPART 100 [IU]/ML
INJECTION, SOLUTION INTRAVENOUS; SUBCUTANEOUS
Qty: 50 ML | Refills: 11 | Status: SHIPPED | OUTPATIENT
Start: 2022-04-14 | End: 2022-11-14

## 2022-07-07 ENCOUNTER — TRANSFERRED RECORDS (OUTPATIENT)
Dept: HEALTH INFORMATION MANAGEMENT | Facility: OTHER | Age: 52
End: 2022-07-07

## 2022-07-07 LAB — RETINOPATHY: NEGATIVE

## 2022-07-22 DIAGNOSIS — M51.26 HERNIATED LUMBAR INTERVERTEBRAL DISC: ICD-10-CM

## 2022-07-25 NOTE — TELEPHONE ENCOUNTER
"Requested Prescriptions   Pending Prescriptions Disp Refills     meloxicam (MOBIC) 7.5 MG tablet [Pharmacy Med Name: Meloxicam 7.5 MG Oral Tablet] 180 tablet 0     Sig: TAKE 1 TO 2 TABLETS BY MOUTH ONCE DAILY AS NEEDED FOR MODERATE PAIN       NSAID Medications Passed - 7/22/2022  5:30 AM        Passed - Blood pressure under 140/90 in past 12 months     BP Readings from Last 3 Encounters:   02/03/22 130/88   03/15/21 138/88   02/26/21 138/70                 Passed - Normal ALT on file in past 12 months     Recent Labs   Lab Test 02/03/22  1331   ALT 41             Passed - Normal AST on file in past 12 months     Recent Labs   Lab Test 02/03/22  1331   AST 24             Passed - Recent (12 mo) or future (30 days) visit within the authorizing provider's specialty     Patient has had an office visit with the authorizing provider or a provider within the authorizing providers department within the previous 12 mos or has a future within next 30 days. See \"Patient Info\" tab in inbasket, or \"Choose Columns\" in Meds & Orders section of the refill encounter.              Passed - Patient is age 6-64 years        Passed - Normal CBC on file in past 12 months     Recent Labs   Lab Test 02/03/22  1331 03/12/18  0830 10/19/17  1051   WBC 7.0   < >  --    GICHWBC  --   --  12.8*   RBC 4.96   < >  --    GICHRBC  --   --  5.03   HGB 14.7   < > 14.9   HCT 44.0   < > 43.7      < > 333    < > = values in this interval not displayed.                 Passed - Medication is active on med list        Passed - Normal serum creatinine on file in past 12 months     Recent Labs   Lab Test 02/03/22  1331   CR 1.12       Ok to refill medication if creatinine is low       Last Written Prescription Date:  12/23/21  Last Fill Quantity: 180,   # refills: 1  Last Office Visit: 2/3/22 Silvino  Future Office visit: none      Routing refill request to provider for review/approval   Brenda J. Goodell, RN on 7/25/2022 at 9:55 AM      "

## 2022-07-26 RX ORDER — MELOXICAM 7.5 MG/1
TABLET ORAL
Qty: 180 TABLET | Refills: 1 | Status: SHIPPED | OUTPATIENT
Start: 2022-07-26 | End: 2022-11-14

## 2022-08-01 ENCOUNTER — TRANSFERRED RECORDS (OUTPATIENT)
Dept: MULTI SPECIALTY CLINIC | Facility: CLINIC | Age: 52
End: 2022-08-01

## 2022-08-01 LAB — RETINOPATHY: NORMAL

## 2022-09-01 DIAGNOSIS — E11.65 TYPE 2 DIABETES MELLITUS WITH HYPERGLYCEMIA, WITH LONG-TERM CURRENT USE OF INSULIN (H): ICD-10-CM

## 2022-09-01 DIAGNOSIS — Z79.4 TYPE 2 DIABETES MELLITUS WITH HYPERGLYCEMIA, WITH LONG-TERM CURRENT USE OF INSULIN (H): ICD-10-CM

## 2022-09-01 NOTE — TELEPHONE ENCOUNTER
States that due to insurance reasons this prescription needs to be dated today and sent back to them.  Please call with questions.      Shashank Sparrow on 9/1/2022 at 10:29 AM

## 2022-09-01 NOTE — TELEPHONE ENCOUNTER
Patient is calling to check progress of medication request. He states that he will be out of his supplies in a few days and the rx needs to be sent down to [place] in the [facility].    Louise Barnett on 9/1/2022 at 2:26 PM

## 2022-09-02 RX ORDER — INFUSION SET FOR INSULIN PUMP
INFUSION SETS-PARAPHERNALIA MISCELLANEOUS
Qty: 10 EACH | Refills: 3 | Status: SHIPPED | OUTPATIENT
Start: 2022-09-02 | End: 2022-12-28

## 2022-09-12 ENCOUNTER — OFFICE VISIT (OUTPATIENT)
Dept: FAMILY MEDICINE | Facility: OTHER | Age: 52
End: 2022-09-12
Attending: NURSE PRACTITIONER
Payer: COMMERCIAL

## 2022-09-12 VITALS
BODY MASS INDEX: 21.97 KG/M2 | WEIGHT: 140 LBS | OXYGEN SATURATION: 97 % | HEART RATE: 90 BPM | HEIGHT: 67 IN | RESPIRATION RATE: 18 BRPM | SYSTOLIC BLOOD PRESSURE: 126 MMHG | DIASTOLIC BLOOD PRESSURE: 72 MMHG | TEMPERATURE: 99.2 F

## 2022-09-12 DIAGNOSIS — L08.9 LOCAL INFECTION OF SKIN AND SUBCUTANEOUS TISSUE: ICD-10-CM

## 2022-09-12 PROCEDURE — 99213 OFFICE O/P EST LOW 20 MIN: CPT | Performed by: FAMILY MEDICINE

## 2022-09-12 PROCEDURE — G0463 HOSPITAL OUTPT CLINIC VISIT: HCPCS

## 2022-09-12 RX ORDER — MUPIROCIN 20 MG/G
OINTMENT TOPICAL 3 TIMES DAILY PRN
Qty: 30 G | Refills: 3 | Status: SHIPPED | OUTPATIENT
Start: 2022-09-12

## 2022-09-12 RX ORDER — CLINDAMYCIN HCL 300 MG
300 CAPSULE ORAL 3 TIMES DAILY
Qty: 30 CAPSULE | Refills: 0 | Status: SHIPPED | OUTPATIENT
Start: 2022-09-12 | End: 2022-09-22

## 2022-09-12 ASSESSMENT — PATIENT HEALTH QUESTIONNAIRE - PHQ9: SUM OF ALL RESPONSES TO PHQ QUESTIONS 1-9: 12

## 2022-09-12 ASSESSMENT — PAIN SCALES - GENERAL: PAINLEVEL: MILD PAIN (2)

## 2022-09-12 NOTE — NURSING NOTE
"Chief Complaint   Patient presents with     let arm has sores in it x 3 weeks         Initial /72 (BP Location: Right arm, Patient Position: Sitting, Cuff Size: Adult Regular)   Temp 99.2  F (37.3  C) (Temporal)   Resp 18   Ht 1.702 m (5' 7\")   Wt 63.5 kg (140 lb)   BMI 21.93 kg/m   Estimated body mass index is 21.93 kg/m  as calculated from the following:    Height as of this encounter: 1.702 m (5' 7\").    Weight as of this encounter: 63.5 kg (140 lb).       FOOD SECURITY SCREENING QUESTIONS:    The next two questions are to help us understand your food security.  If you are feeling you need any assistance in this area, we have resources available to support you today.    Hunger Vital Signs:  Within the past 12 months we worried whether our food would run out before we got money to buy more. Never  Within the past 12 months the food we bought just didn't last and we didn't have money to get more. Never    Advance Care Directive on file? no      Medication reconciliation complete.      Trevor Monroy,on 9/12/2022 at 3:22 PM        " Patient is a 72y old  Male who presents with a chief complaint of S/P fall, Rhabdo, R/O Pneumonia, Leukocytosis, Cough, R/O Cellulitis Face/lower Abdomen (13 Aug 2019 13:47)      SUBJECTIVE / OVERNIGHT EVENTS: Per RN, pulled out NGT last night stating he wanted a new NGT before TFs were started. Denies chest pain, SOB, cough, + constipation.    MEDICATIONS  (STANDING):  artificial tears (preservative free) Ophthalmic Solution 1 Drop(s) Both EYES three times a day  finasteride 5 milliGRAM(s) Oral daily  heparin  Injectable 5000 Unit(s) SubCutaneous two times a day  lactobacillus acidophilus 1 Tablet(s) Oral every 12 hours  LORazepam   Injectable 0.5 milliGRAM(s) IV Push two times a day  potassium chloride  10 mEq/100 mL IVPB 10 milliEquivalent(s) IV Intermittent every 1 hour    MEDICATIONS  (PRN):  ALBUTerol/ipratropium for Nebulization 3 milliLiter(s) Nebulizer every 6 hours PRN Shortness of Breath and/or Wheezing  LORazepam   Injectable 0.5 milliGRAM(s) IV Push every 6 hours PRN Agitation/insomnia      T(C): 36.7 (19 @ 02:00), Max: 36.7 (19 @ 02:00)  HR: 73 (19 @ 02:00) (73 - 88)  BP: 113/61 (19 @ 02:00) (113/61 - 118/63)  RR: 16 (19 @ 02:00) (15 - 16)  SpO2: 98% (19 @ 02:00) (97% - 98%)  CAPILLARY BLOOD GLUCOSE    I&O's Summary    13 Aug 2019 07:01  -  14 Aug 2019 07:00  --------------------------------------------------------  IN: 0 mL / OUT: 500 mL / NET: -500 mL    PHYSICAL EXAM:  GENERAL: thin and disheveled, no apparent distress, on room air  HEENT: bruising over right cheekbone and jaw with some TTP, and bruising/tear of right ear covered with dressing, + NGT  CHEST/LUNG: CTA anteriorly b/l  HEART: s1/s2, RRR, no murmurs appreciated  ABDOMEN: Soft, Nontender, suprapubic fullness and discomfort to palpation  EXTREMITIES: WWP, trace ankle edema b/l  : scrotal swelling, no fowler  NEUROLOGY: awake, alert, able to move all extremities, responds to Qs appropriately  PSYCH: calm, cooperative, follows commands, linear thought process although needs redirecting at times  SKIN: bruising over right thigh and right side of face  LABS:                        10.6   9.56  )-----------( 352      ( 14 Aug 2019 06:00 )             32.5     08-14    135  |  96<L>  |  22  ----------------------------<  95  3.5   |  24  |  0.65    Ca    8.8      14 Aug 2019 06:00  Phos  2.8     08-14  Mg     1.9     08-14            Urinalysis Basic - ( 12 Aug 2019 19:25 )    Color: YELLOW / Appearance: CLEAR / S.016 / pH: 6.0  Gluc: NEGATIVE / Ketone: SMALL  / Bili: NEGATIVE / Urobili: NORMAL   Blood: MODERATE / Protein: 50 / Nitrite: NEGATIVE   Leuk Esterase: NEGATIVE / RBC: 26-50 / WBC 3-5   Sq Epi: OCC / Non Sq Epi: x / Bacteria: FEW        RADIOLOGY & ADDITIONAL TESTS:

## 2022-09-12 NOTE — PROGRESS NOTES
"  Assessment & Plan     1. Local infection of skin and subcutaneous tissue  New, but recurrent.  Renewed his hibiclens and bactroban.  Rx for larger area with surrounding skin redness on LUE - start clindamycin TID x 10 days.  Needs to improve DM control - encouraged him to meet with PCP or CDE.  Cleanse otherwise with soapy water.  Discouraged further hydrogen peroxide use.  - chlorhexidine (HIBICLENS) 4 % liquid; Apply topically daily as needed for wound care  Dispense: 947 mL; Refill: 3  - clindamycin (CLEOCIN) 300 MG capsule; Take 1 capsule (300 mg) by mouth 3 times daily for 10 days  Dispense: 30 capsule; Refill: 0  - mupirocin (BACTROBAN) 2 % external ointment; Apply topically 3 times daily as needed (wound)  Dispense: 30 g; Refill: 3     Depression Screening Follow Up  PHQ 9/12/2022   PHQ-9 Total Score 12   Q9: Thoughts of better off dead/self-harm past 2 weeks Not at all       Follow Up Actions Taken  Referred patient back to PCP     Elaine Lozada, [facility]      Subjective   Rick is a 52 year old, presenting for the following health issues:  Sores on left arm and  A few on right arm.  Has had them before.    HPI   Rick is an insulin dependent diabetic here with recurrent skin lesions/ulcerations on his arms.  Multiple areas of open skin.  Recurrent for him.  Requires antibiotics intermittently for it.  PCP has even Rx chlorhexadine and bactroban in the past.  Would like those refilled as it seemed to help keep at bay.  Reports blood sugars have been ~300s.  Last A1c noted to be uncontrolled at 9+%  No fever, chills.  No N/V/D.         Objective    /72 (BP Location: Right arm, Patient Position: Sitting, Cuff Size: Adult Regular)   Pulse 90   Temp 99.2  F (37.3  C) (Temporal)   Resp 18   Ht 1.702 m (5' 7\")   Wt 63.5 kg (140 lb)   SpO2 97%   BMI 21.93 kg/m    Body mass index is 21.93 kg/m .  Physical Exam   GENERAL: healthy, alert and no distress  SKIN: multiple " large open ulcerated areas; some appear chronic with thickening of the border skin.  Inflamed/swollen lesion on L distal upper arm with 5cm ovoid erythema surrounding lesion.  LYMPH: no cervical, supraclavicular, axillary, or inguinal adenopathy    No results found for any visits on 09/12/22.

## 2022-09-22 DIAGNOSIS — E11.65 TYPE 2 DIABETES MELLITUS WITH HYPERGLYCEMIA, WITH LONG-TERM CURRENT USE OF INSULIN (H): ICD-10-CM

## 2022-09-22 DIAGNOSIS — Z79.4 TYPE 2 DIABETES MELLITUS WITH HYPERGLYCEMIA, WITH LONG-TERM CURRENT USE OF INSULIN (H): ICD-10-CM

## 2022-09-22 RX ORDER — INSULIN PUMP CARTRIDGE
CARTRIDGE (EA) SUBCUTANEOUS
Qty: 30 EACH | Refills: 11 | Status: SHIPPED | OUTPATIENT
Start: 2022-09-22 | End: 2023-10-04

## 2022-09-22 NOTE — TELEPHONE ENCOUNTER
[facility] sent Rx request for the following:      Requested Prescriptions   Pending Prescriptions Disp Refills     Insulin Infusion Pump Supplies (T:SLIM X2 3ML CARTRIDGE) MISC [Pharmacy Med Name: TSLIM X2 3ML CARTRIDGE MISC]  3     Sig: CHANGE EVERY 3 DAYS   Last Prescription Date:   2/9/22  Last Fill Qty/Refills:         10, R-3    Last Office Visit:              2/3/22   Future Office visit:           None    Per LOV note:  Next diabetes office visit: 3 months    Pt due for follow up exam. Routing to provider for refill consideration. Routing to  OUTREACH APPT REQUESTS pool, to assist Pt in scheduling appointment.     Rebekah Ramirez RN .............. 9/22/2022  4:35 PM

## 2022-09-27 NOTE — TELEPHONE ENCOUNTER
Patient is scheduled for annual physical and diabetic check on 11/07/2022.    Mary Christian on 9/27/2022 at 12:32 PM

## 2022-11-14 ENCOUNTER — OFFICE VISIT (OUTPATIENT)
Dept: PEDIATRICS | Facility: OTHER | Age: 52
End: 2022-11-14
Attending: INTERNAL MEDICINE
Payer: COMMERCIAL

## 2022-11-14 VITALS
DIASTOLIC BLOOD PRESSURE: 86 MMHG | BODY MASS INDEX: 22.32 KG/M2 | RESPIRATION RATE: 20 BRPM | HEIGHT: 66 IN | WEIGHT: 138.9 LBS | SYSTOLIC BLOOD PRESSURE: 120 MMHG | HEART RATE: 96 BPM | OXYGEN SATURATION: 97 % | TEMPERATURE: 98.6 F

## 2022-11-14 DIAGNOSIS — K21.00 GASTROESOPHAGEAL REFLUX DISEASE WITH ESOPHAGITIS, UNSPECIFIED WHETHER HEMORRHAGE: ICD-10-CM

## 2022-11-14 DIAGNOSIS — Z23 NEED FOR VACCINATION: ICD-10-CM

## 2022-11-14 DIAGNOSIS — Z00.00 ANNUAL PHYSICAL EXAM: Primary | ICD-10-CM

## 2022-11-14 DIAGNOSIS — M51.26 HERNIATED LUMBAR INTERVERTEBRAL DISC: ICD-10-CM

## 2022-11-14 DIAGNOSIS — E11.65 UNCONTROLLED TYPE 2 DIABETES MELLITUS WITH HYPERGLYCEMIA (H): ICD-10-CM

## 2022-11-14 DIAGNOSIS — Z79.4 TYPE 2 DIABETES MELLITUS WITH HYPERGLYCEMIA, WITH LONG-TERM CURRENT USE OF INSULIN (H): ICD-10-CM

## 2022-11-14 DIAGNOSIS — Z12.5 SCREENING FOR PROSTATE CANCER: ICD-10-CM

## 2022-11-14 DIAGNOSIS — Z79.899 ENCOUNTER FOR LONG-TERM (CURRENT) USE OF MEDICATIONS: ICD-10-CM

## 2022-11-14 DIAGNOSIS — E03.9 ACQUIRED HYPOTHYROIDISM: ICD-10-CM

## 2022-11-14 DIAGNOSIS — M51.369 DDD (DEGENERATIVE DISC DISEASE), LUMBAR: ICD-10-CM

## 2022-11-14 DIAGNOSIS — Z12.11 COLON CANCER SCREENING: ICD-10-CM

## 2022-11-14 DIAGNOSIS — B35.3 TINEA PEDIS OF BOTH FEET: ICD-10-CM

## 2022-11-14 DIAGNOSIS — E11.65 TYPE 2 DIABETES MELLITUS WITH HYPERGLYCEMIA, WITH LONG-TERM CURRENT USE OF INSULIN (H): ICD-10-CM

## 2022-11-14 LAB
ANION GAP SERPL CALCULATED.3IONS-SCNC: 14 MMOL/L (ref 7–15)
BUN SERPL-MCNC: 16.4 MG/DL (ref 6–20)
CALCIUM SERPL-MCNC: 9.6 MG/DL (ref 8.6–10)
CHLORIDE SERPL-SCNC: 100 MMOL/L (ref 98–107)
CHOLEST SERPL-MCNC: 138 MG/DL
CREAT SERPL-MCNC: 1.03 MG/DL (ref 0.67–1.17)
CREAT UR-MCNC: 155.6 MG/DL
DEPRECATED HCO3 PLAS-SCNC: 26 MMOL/L (ref 22–29)
GFR SERPL CREATININE-BSD FRML MDRD: 87 ML/MIN/1.73M2
GLUCOSE SERPL-MCNC: 276 MG/DL (ref 70–99)
HBA1C MFR BLD: 8.8 % (ref 4–6.2)
HDLC SERPL-MCNC: 66 MG/DL
LDLC SERPL CALC-MCNC: 56 MG/DL
MICROALBUMIN UR-MCNC: 17.3 MG/L
MICROALBUMIN/CREAT UR: 11.12 MG/G CR (ref 0–17)
NONHDLC SERPL-MCNC: 72 MG/DL
POTASSIUM SERPL-SCNC: 4.7 MMOL/L (ref 3.4–5.3)
PSA SERPL-MCNC: 1.71 NG/ML (ref 0–3.5)
SODIUM SERPL-SCNC: 140 MMOL/L (ref 136–145)
TRIGL SERPL-MCNC: 81 MG/DL

## 2022-11-14 PROCEDURE — 90682 RIV4 VACC RECOMBINANT DNA IM: CPT

## 2022-11-14 PROCEDURE — 83036 HEMOGLOBIN GLYCOSYLATED A1C: CPT | Mod: ZL | Performed by: INTERNAL MEDICINE

## 2022-11-14 PROCEDURE — 82043 UR ALBUMIN QUANTITATIVE: CPT | Mod: ZL | Performed by: INTERNAL MEDICINE

## 2022-11-14 PROCEDURE — 91312 COVID-19,PF,PFIZER BOOSTER BIVALENT: CPT

## 2022-11-14 PROCEDURE — 80048 BASIC METABOLIC PNL TOTAL CA: CPT | Mod: ZL | Performed by: INTERNAL MEDICINE

## 2022-11-14 PROCEDURE — 99396 PREV VISIT EST AGE 40-64: CPT | Performed by: INTERNAL MEDICINE

## 2022-11-14 PROCEDURE — 80061 LIPID PANEL: CPT | Mod: ZL | Performed by: INTERNAL MEDICINE

## 2022-11-14 PROCEDURE — 36415 COLL VENOUS BLD VENIPUNCTURE: CPT | Mod: ZL | Performed by: INTERNAL MEDICINE

## 2022-11-14 PROCEDURE — 0124A COVID-19,PF,PFIZER BOOSTER BIVALENT: CPT

## 2022-11-14 PROCEDURE — G0103 PSA SCREENING: HCPCS | Mod: ZL | Performed by: INTERNAL MEDICINE

## 2022-11-14 PROCEDURE — 90677 PCV20 VACCINE IM: CPT

## 2022-11-14 RX ORDER — MELOXICAM 7.5 MG/1
7.5-15 TABLET ORAL DAILY PRN
Qty: 180 TABLET | Refills: 3 | Status: SHIPPED | OUTPATIENT
Start: 2022-11-14 | End: 2023-03-20

## 2022-11-14 RX ORDER — SIMVASTATIN 20 MG
20 TABLET ORAL AT BEDTIME
Qty: 90 TABLET | Refills: 4 | Status: SHIPPED | OUTPATIENT
Start: 2022-11-14 | End: 2023-10-27

## 2022-11-14 RX ORDER — INSULIN ASPART 100 [IU]/ML
INJECTION, SOLUTION INTRAVENOUS; SUBCUTANEOUS
Qty: 50 ML | Refills: 11 | Status: SHIPPED | OUTPATIENT
Start: 2022-11-14 | End: 2023-01-10

## 2022-11-14 RX ORDER — LEVOTHYROXINE SODIUM 100 UG/1
100 TABLET ORAL DAILY
Qty: 90 TABLET | Refills: 3 | Status: SHIPPED | OUTPATIENT
Start: 2022-11-14 | End: 2023-12-26

## 2022-11-14 RX ORDER — CLOTRIMAZOLE 1 %
CREAM (GRAM) TOPICAL 2 TIMES DAILY
Qty: 113 G | Refills: 11 | Status: SHIPPED | OUTPATIENT
Start: 2022-11-14

## 2022-11-14 ASSESSMENT — ANXIETY QUESTIONNAIRES
3. WORRYING TOO MUCH ABOUT DIFFERENT THINGS: MORE THAN HALF THE DAYS
7. FEELING AFRAID AS IF SOMETHING AWFUL MIGHT HAPPEN: SEVERAL DAYS
GAD7 TOTAL SCORE: 10
4. TROUBLE RELAXING: SEVERAL DAYS
7. FEELING AFRAID AS IF SOMETHING AWFUL MIGHT HAPPEN: SEVERAL DAYS
5. BEING SO RESTLESS THAT IT IS HARD TO SIT STILL: SEVERAL DAYS
GAD7 TOTAL SCORE: 10
2. NOT BEING ABLE TO STOP OR CONTROL WORRYING: MORE THAN HALF THE DAYS
IF YOU CHECKED OFF ANY PROBLEMS ON THIS QUESTIONNAIRE, HOW DIFFICULT HAVE THESE PROBLEMS MADE IT FOR YOU TO DO YOUR WORK, TAKE CARE OF THINGS AT HOME, OR GET ALONG WITH OTHER PEOPLE: VERY DIFFICULT
8. IF YOU CHECKED OFF ANY PROBLEMS, HOW DIFFICULT HAVE THESE MADE IT FOR YOU TO DO YOUR WORK, TAKE CARE OF THINGS AT HOME, OR GET ALONG WITH OTHER PEOPLE?: VERY DIFFICULT
1. FEELING NERVOUS, ANXIOUS, OR ON EDGE: SEVERAL DAYS
GAD7 TOTAL SCORE: 10
6. BECOMING EASILY ANNOYED OR IRRITABLE: MORE THAN HALF THE DAYS

## 2022-11-14 ASSESSMENT — ENCOUNTER SYMPTOMS
SORE THROAT: 0
NAUSEA: 0
NERVOUS/ANXIOUS: 1
EYE PAIN: 0
ARTHRALGIAS: 1
FREQUENCY: 1
CONSTIPATION: 1
HEMATURIA: 0
JOINT SWELLING: 0
PALPITATIONS: 0
COUGH: 0
WEAKNESS: 1
DYSURIA: 0
HEARTBURN: 0
DIARRHEA: 0
CHILLS: 0
PARESTHESIAS: 1
DIZZINESS: 0
HEADACHES: 0
ABDOMINAL PAIN: 0
HEMATOCHEZIA: 0
MYALGIAS: 1
SHORTNESS OF BREATH: 0
FEVER: 0

## 2022-11-14 ASSESSMENT — PATIENT HEALTH QUESTIONNAIRE - PHQ9
SUM OF ALL RESPONSES TO PHQ QUESTIONS 1-9: 8
10. IF YOU CHECKED OFF ANY PROBLEMS, HOW DIFFICULT HAVE THESE PROBLEMS MADE IT FOR YOU TO DO YOUR WORK, TAKE CARE OF THINGS AT HOME, OR GET ALONG WITH OTHER PEOPLE: VERY DIFFICULT
SUM OF ALL RESPONSES TO PHQ QUESTIONS 1-9: 8

## 2022-11-14 ASSESSMENT — PAIN SCALES - GENERAL: PAINLEVEL: NO PAIN (0)

## 2022-11-14 NOTE — NURSING NOTE
"Chief Complaint   Patient presents with     Physical     Diabetes         Initial /86   Pulse 96   Temp 98.6  F (37  C) (Tympanic)   Resp 20   Ht 1.683 m (5' 6.25\")   Wt 63 kg (138 lb 14.4 oz)   SpO2 97%   BMI 22.25 kg/m   Estimated body mass index is 22.25 kg/m  as calculated from the following:    Height as of this encounter: 1.683 m (5' 6.25\").    Weight as of this encounter: 63 kg (138 lb 14.4 oz).         Norma J. Gosselin, LPN   "

## 2022-11-14 NOTE — PATIENT INSTRUCTIONS
-- Start metamucil 1 tbsp in at least 8 oz water     -- Okay to continue clotrimazole for foot   -- Keep feet dry     -- Get the new shingles vaccine series from a nurse-only visit, pharmacy or [facility] ([facility] RN).   -- Flu and covid boosters today

## 2022-11-14 NOTE — PROGRESS NOTES
SUBJECTIVE:   CC: Rick is an 52 year old who presents for preventative health visit.     He needs a refill of that cream for his feet.  If he uses it for even a day or 2 it goes away but then it comes back again.  His CGM helps him a lot.  Everything is mostly under 200 and rare under 70.  He thinks it may be going on the [place] and wants to know if he needs to get a new unit.  His back causes him a lot of problems.  He tried physical therapy but did not find improvement.  Has been taking 2 and even 3 meloxicam's when his pain is real bad.      Patient has been advised of split billing requirements and indicates understanding: Yes  Healthy Habits:     Getting at least 3 servings of Calcium per day:  Yes    Bi-annual eye exam:  Yes    Dental care twice a year:  Yes    Sleep apnea or symptoms of sleep apnea:  None    Diet:  Diabetic    Frequency of exercise:  1 day/week    Duration of exercise:  Less than 15 minutes    Taking medications regularly:  Yes    PHQ-2 Total Score: 2    Additional concerns today:  No              Today's PHQ-2 Score:   PHQ-2 ( 1999 Pfizer) 11/14/2022   Q1: Little interest or pleasure in doing things 1   Q2: Feeling down, depressed or hopeless 1   PHQ-2 Score 2   PHQ-2 Total Score (12-17 Years)- Positive if 3 or more points; Administer PHQ-A if positive -   Q1: Little interest or pleasure in doing things Several days   Q2: Feeling down, depressed or hopeless Several days   PHQ-2 Score 2       Abuse: Current or Past(Physical, Sexual or Emotional)- No  Do you feel safe in your environment? Yes    Have you ever done Advance Care Planning? (For example, a Health Directive, POLST, or a discussion with a medical provider or your loved ones about your wishes): No, advance care planning information given to patient to review.  Patient plans to discuss their wishes with loved ones or provider.      Social History     Tobacco Use     Smoking status: Former     Packs/day: 0.50     Years: 20.00     Pack  years: 10.00     Types: Cigarettes     Quit date: 2015     Years since quittin.2     Smokeless tobacco: Never     Tobacco comments:     Quit smoking: has chantix   Substance Use Topics     Alcohol use: Yes     Alcohol/week: 10.0 - 15.0 standard drinks     Types: 10 - 15 Standard drinks or equivalent per week     Comment: 2 drinks a day     If you drink alcohol do you typically have >3 drinks per day or >7 drinks per week? Yes      Alcohol Use 2022   Prescreen: >3 drinks/day or >7 drinks/week? Yes   Prescreen: >3 drinks/day or >7 drinks/week? -   AUDIT SCORE  14     AUDIT - Alcohol Use Disorders Identification Test - Reproduced from the World Health Organization Audit 2001 (Second Edition) 2022   1.  How often do you have a drink containing alcohol? 2 to 3 times a week   2.  How many drinks containing alcohol do you have on a typical day when you are drinking? 3 or 4   3.  How often do you have five or more drinks on one occasion? Monthly   4.  How often during the last year have you found that you were not able to stop drinking once you had started? Less than monthly   5.  How often during the last year have you failed to do what was normally expected of you because of drinking? Never   6.  How often during the last year have you needed a first drink in the morning to get yourself going after a heavy drinking session? Never   7.  How often during the last year have you had a feeling of guilt or remorse after drinking? Monthly   8.  How often during the last year have you been unable to remember what happened the night before because of your drinking? Less than monthly   9.  Have you or someone else been injured because of your drinking? No   10. Has a relative, friend, doctor or other health care worker been concerned about your drinking or suggested you cut down? Yes, during the last year   TOTAL SCORE 14       Last PSA:   Prostate Specific Antigen Screen   Date Value Ref Range Status  "  11/14/2022 1.71 0.00 - 3.50 ng/mL Final       Reviewed orders with patient. Reviewed health maintenance and updated orders accordingly -       Reviewed and updated as needed this visit by clinical staff   Tobacco  Allergies  Meds  Problems  Med Hx  Surg Hx  Fam Hx  Soc   Hx        Reviewed and updated as needed this visit by Provider   Tobacco  Allergies  Meds  Problems  Med Hx  Surg Hx  Fam Hx             Review of Systems  CONSTITUTIONAL: NEGATIVE for fever, chills, change in weight  INTEGUMENTARY/SKIN: NEGATIVE for worrisome rashes, moles or lesions  EYES: NEGATIVE for vision changes or irritation  ENT: NEGATIVE for ear, mouth and throat problems  RESP: NEGATIVE for significant cough or SOB  CV: NEGATIVE for chest pain, palpitations or peripheral edema  GI: NEGATIVE for nausea, abdominal pain, heartburn, or change in bowel habits   male: negative for dysuria, hematuria, decreased urinary stream, erectile dysfunction, urethral discharge  MUSCULOSKELETAL: NEGATIVE for significant arthralgias or myalgia  NEURO: NEGATIVE for weakness, dizziness or paresthesias  PSYCHIATRIC: NEGATIVE for changes in mood or affect    OBJECTIVE:   /86   Pulse 96   Temp 98.6  F (37  C) (Tympanic)   Resp 20   Ht 1.683 m (5' 6.25\")   Wt 63 kg (138 lb 14.4 oz)   SpO2 97%   BMI 22.25 kg/m      Physical Exam  HEENT: No carotid bruits  Cardiovascular: Regular, no murmur  Pulmonary: Clear    Foot Exam:  11/14/2022  Intact to monofilament bilaterally.  Skin intact without erythema.      Diagnostic Test Results:  Labs reviewed in Epic    ASSESSMENT/PLAN:       ICD-10-CM    1. Annual physical exam  Z00.00       2. Uncontrolled type 2 diabetes mellitus with hyperglycemia (H)  E11.65 Albumin Random Urine Quantitative with Creat Ratio     Adult Eye  Referral     AMB Adult Diabetes Educator Referral     blood glucose monitoring (NO BRAND SPECIFIED) meter device kit     blood glucose (NO BRAND SPECIFIED) test " "strip     blood glucose (NO BRAND SPECIFIED) lancets standard     Hemoglobin A1c     Basic metabolic panel     Lipid Profile     Lipid Profile     Basic metabolic panel     Hemoglobin A1c     Albumin Random Urine Quantitative with Creat Ratio      3. Encounter for long-term (current) use of medications  Z79.899       4. Tinea pedis of both feet  B35.3 clotrimazole (LOTRIMIN) 1 % external cream      5. Type 2 diabetes mellitus with hyperglycemia, with long-term current use of insulin (H)  E11.65 insulin aspart (NOVOLOG VIAL) 100 UNITS/ML vial    Z79.4 simvastatin (ZOCOR) 20 MG tablet      6. Acquired hypothyroidism  E03.9 levothyroxine (EUTHYROX) 100 MCG tablet      7. Gastroesophageal reflux disease with esophagitis, unspecified whether hemorrhage  K21.00       8. Need for vaccination  Z23 INFLUENZA QUAD, RECOMBINANT, P-FREE (RIV4) (FLUBLOK) AGE 50-64 [[id number]]     COVID-19,PF,PFIZER BOOSTER BIVALENT (12+YRS)      9. Colon cancer screening  Z12.11 COLOGUARD(EXACT SCIENCES)      10. Screening for prostate cancer  Z12.5 PSA Screen GH     PSA Screen GH      11. Herniated lumbar intervertebral disc  M51.26 meloxicam (MOBIC) 7.5 MG tablet     Occupational Medicine Referral      12. DDD (degenerative disc disease), lumbar  M51.36 Occupational Medicine Referral              COUNSELING:   Reviewed preventive health counseling, as reflected in patient instructions    Estimated body mass index is 22.25 kg/m  as calculated from the following:    Height as of this encounter: 1.683 m (5' 6.25\").    Weight as of this encounter: 63 kg (138 lb 14.4 oz).         He reports that he quit smoking about 7 years ago. His smoking use included cigarettes. He has a 10.00 pack-year smoking history. He has never used smokeless tobacco.      Counseling Resources:  ATP IV Guidelines  Pooled Cohorts Equation Calculator  FRAX Risk Assessment  ICSI Preventive Guidelines  Dietary Guidelines for Americans, 2010  USDA's MyPlate  ASA Prophylaxis  Lung " CA Screening    Adebayo Dubois MD  [facility]  Answers for HPI/ROS submitted by the patient on 11/14/2022  If you checked off any problems, how difficult have these problems made it for you to do your work, take care of things at home, or get along with other people?: Very difficult  PHQ9 TOTAL SCORE: 8  ANH 7 TOTAL SCORE: 10    Patient currently uses a Dexcom G6 CGM for continuous monitoring of glucose.   Patient injects or boluses insulin 3 or more times daily before breakfast, before lunch, before dinner, and bedtime.  Patient requires frequent adjustments to their insulin treatment regimen on the basis of therapeutic CGM testing results.

## 2022-11-14 NOTE — LETTER
November 14, 2022      [address]  [address]   [place] [address]        Dear ,    We are writing to inform you of your test results.    Overall your lab work looks good.  Your A1c has improved although could be better.  Everything else looks fine.    No change to our plan.    Signed, Adebayo Dubois MD, FAAP, FACP  Internal Medicine & Pediatrics      Resulted Orders   Lipid Profile   Result Value Ref Range    Cholesterol 138 <200 mg/dL    Triglycerides 81 <150 mg/dL    Direct Measure HDL 66 >=40 mg/dL    LDL Cholesterol Calculated 56 <=100 mg/dL    Non HDL Cholesterol 72 <130 mg/dL    Narrative    Cholesterol  Desirable:  <200 mg/dL    Triglycerides  Normal:  Less than 150 mg/dL  Borderline High:  150-199 mg/dL  High:  200-499 mg/dL  Very High:  Greater than or equal to 500 mg/dL    Direct Measure HDL  Female:  Greater than or equal to 50 mg/dL   Male:  Greater than or equal to 40 mg/dL    LDL Cholesterol  Desirable:  <100mg/dL  Above Desirable:  100-129 mg/dL   Borderline High:  130-159 mg/dL   High:  160-189 mg/dL   Very High:  >= 190 mg/dL    Non HDL Cholesterol  Desirable:  130 mg/dL  Above Desirable:  130-159 mg/dL  Borderline High:  160-189 mg/dL  High:  190-219 mg/dL  Very High:  Greater than or equal to 220 mg/dL   Basic metabolic panel   Result Value Ref Range    Sodium 140 136 - 145 mmol/L    Potassium 4.7 3.4 - 5.3 mmol/L    Chloride 100 98 - 107 mmol/L    Carbon Dioxide (CO2) 26 22 - 29 mmol/L    Anion Gap 14 7 - 15 mmol/L    Urea Nitrogen 16.4 6.0 - 20.0 mg/dL    Creatinine 1.03 0.67 - 1.17 mg/dL    Calcium 9.6 8.6 - 10.0 mg/dL    Glucose 276 (H) 70 - 99 mg/dL    GFR Estimate 87 >60 mL/min/1.73m2      Comment:      Effective December 21, 2021 eGFRcr in adults is calculated using the 2021 CKD-EPI creatinine equation which includes age and gender (Gaurang de paz al., NEJM, DOI: 10.1056/YUFIpx3810579)   Hemoglobin A1c   Result Value Ref Range    Hemoglobin A1C 8.8 (H) 4.0 - 6.2 %   PSA Screen GH    Result Value Ref Range    Prostate Specific Antigen Screen 1.71 0.00 - 3.50 ng/mL    Narrative    This result is obtained using the Roche Elecsys total PSA method on the rosalind e601 immunoassay analyzer. Results obtained with different assay methods or kits cannot be used interchangeably.   Albumin Random Urine Quantitative with Creat Ratio   Result Value Ref Range    Albumin Urine mg/L 17.3 mg/L      Comment:      The reference ranges have not been established in urine albumin. The results should be integrated into the clinical context for interpretation.    Albumin Urine mg/g Cr 11.12 0.00 - 17.00 mg/g Cr      Comment:      Microalbuminuria is defined as an albumin:creatinine ratio of 17 to 299 for males and 25 to 299 for females. A ratio of albumin:creatinine of 300 or higher is indicative of overt proteinuria.  Due to biologic variability, positive results should be confirmed by a second, first-morning random or 24-hour timed urine specimen. If there is discrepancy, a third specimen is recommended. When 2 out of 3 results are in the microalbuminuria range, this is evidence for incipient nephropathy and warrants increased efforts at glucose control, blood pressure control, and institution of therapy with an angiotensin-converting-enzyme (ACE) inhibitor (if the patient can tolerate it).      Creatinine Urine mg/dL 155.6 mg/dL      Comment:      The reference ranges have not been established in urine creatinine. The results should be integrated into the clinical context for interpretation.       If you have any questions or concerns, please call the clinic at the number listed above.       Sincerely,      Adebayo Dubois MD

## 2022-11-15 DIAGNOSIS — E11.65 UNCONTROLLED TYPE 2 DIABETES MELLITUS WITH HYPERGLYCEMIA (H): Primary | ICD-10-CM

## 2022-11-17 ENCOUNTER — VIRTUAL VISIT (OUTPATIENT)
Dept: EDUCATION SERVICES | Facility: OTHER | Age: 52
End: 2022-11-17
Attending: INTERNAL MEDICINE
Payer: COMMERCIAL

## 2022-11-17 DIAGNOSIS — E11.65 UNCONTROLLED TYPE 2 DIABETES MELLITUS WITH HYPERGLYCEMIA (H): ICD-10-CM

## 2022-11-17 PROCEDURE — G0108 DIAB MANAGE TRN  PER INDIV: HCPCS | Mod: TEL | Performed by: REGISTERED NURSE

## 2022-11-17 NOTE — PROGRESS NOTES
"Diabetes Self-Management Education & Support    Presents for:      Type of Service: Telephone Visit    Originating Location (Patient Location): Home  Distant Location (Provider Location): Home  Mode of Communication:  Telephone    Telephone Visit Start Time: 9:40  Telephone Visit End Time (telephone visit stop time): 10:30 am    How would patient like to obtain AVS? Mail a copy    Assessment Type:   REPORTS:  Unable to view pump report on line.  Helped patient set up CGM report sharing on line with [facility].    ASSESSMENT    CGM download glucose results:    Report shows 242 mg/dl average sensor glucose over the past two weeks.    Standard deviation (SD) is +- 69 mg/dl. Goal for SD is +-50 mg/dl or lower.      GMI (Glucose Management Indicator or HbA1c ) 9.1%.    Glucose 23% in target (70 - 180), 33% high (181 - 249), 44% very high (250 - 400), 0% low (56 - 69) and 0% very low (39 - 55).      Rick had a pattern of significant nighttime highs between 5:50 PM and 7:25 AM.  Rick had a pattern of significant daytime highs between 5:50 PM and 7:25 AM.    No significant patterns of lows found.    Per patient report, he is using Quick Bolus and taking 4 - 5 units with breakfast, he does not consume lunch, but will consume supper at 4:00 pm.  Sometimes he takes insulin for supper.  \"Then after supper I get very hungry, so I nibble a lot.  I do not take insulin for the nibbling.\"    Patient glucose rises above 300 mg/dL at 7:45 pm and remains elevated above 300 mg/dL until 4:00 am.      Discussed how hyperglycemia can make us feel very hungry as our cells are NOT receiving nutrients/energy and we can feel like we are starving.  Improving glucose control can help decrease this extreme hunger and help us feel satiety.      INTERVENTION    Plan:    1.  Due to report of pump issue turning off and having to restart it, recommend patient call [employer] line, patient has phone number, states he " called it, but was on hold too long, so did not call back.  Encouraged patient to try again, notified him Tech Support is open 24/7, so he can call at a less busy time and hopefully get to speak with someone sooner.    2.  Adjusted Sleep Schedule from 8:00 pm - 8:00 am to 12:00 am - 8:00 am to allow pump to give auto-corrections in the late evening to help bring glucose closer to target.    3.   Due to pattern of extreme hyperglycemia in the evenings through the night, recommend patient begin taking Quick Bolus insulin with supper minimum of 3 units.      4.  Recommend taking Quick Bolus insulin for carb snacking 1 - 2 units for those evening nibbling times to help decrease risk of extreme hyperglycemia above 250 mg/dL.       5.  Follow up for continued SG assessment, insulin adjustment in two weeks.          Insulin Pump Information       Education specific to insulin pump provided today on:   importance of changing infusion set every 3 days, importance of bolusing before meals, benefits of post-meal blood glucose testing , steps to take when blood glucose is about 250 mg/dL and what pump alerts and alarms indicate    Opportunities for ongoing education and support in diabetes-self management were discussed.    Pt verbalized understanding of concepts discussed and recommendations provided today.       Continue education with the following diabetes management concepts: Healthy Eating, Taking Medication, Problem Solving and Reducing Risks    Pump Education Materials: No new materials mailed today.        PLAN    See plan above.  Topics to cover at upcoming visits: Healthy Eating, Being Active, Problem Solving and Reducing Risks    Follow-up: TBD per patient schedule, recommend two week follow up.     See Care Plan for co-developed, patient-state behavior change goals.  AVS provided for patient today.        SUBJECTIVE/OBJECTIVE:     Cultural Influences/Ethnic Background:  Not  or     Diabetes Symptoms &  "Complications:     Patient Problem List and Family Medical History reviewed for relevant medical history, current medical status, and diabetes risk factors.    Vitals:  There were no vitals taken for this visit.  Estimated body mass index is 22.25 kg/m  as calculated from the following:    Height as of 11/14/22: 1.683 m (5' 6.25\").    Weight as of 11/14/22: 63 kg (138 lb 14.4 oz).   Last 3 BP:   BP Readings from Last 3 Encounters:   11/14/22 120/86   09/12/22 126/72   02/03/22 130/88       History   Smoking Status     Former     Packs/day: 0.50     Years: 20.00     Types: Cigarettes     Quit date: 8/30/2015   Smokeless Tobacco     Never     Comment: Quit smoking: has chantix       Labs:  Lab Results   Component Value Date    A1C 8.8 11/14/2022    A1C 10.1 11/27/2020     Lab Results   Component Value Date     11/14/2022     02/03/2022     11/27/2020     Lab Results   Component Value Date    LDL 56 11/14/2022    LDL 74 11/27/2020     HDL Cholesterol   Date Value Ref Range Status   11/27/2020 48 23 - 92 mg/dL Final     Direct Measure HDL   Date Value Ref Range Status   11/14/2022 66 >=40 mg/dL Final   ]  GFR Estimate   Date Value Ref Range Status   11/14/2022 87 >60 mL/min/1.73m2 Final     Comment:     Effective December 21, 2021 eGFRcr in adults is calculated using the 2021 CKD-EPI creatinine equation which includes age and gender (Gaurang de paz al., [place], DOI: 10.1056/XUSDye1523587)   11/27/2020 61 >60 mL/min/[1.73_m2] Final     GFR Estimate If Black   Date Value Ref Range Status   11/27/2020 74 >60 mL/min/[1.73_m2] Final     Lab Results   Component Value Date    CR 1.03 11/14/2022    CR 1.25 11/27/2020     No results found for: MICROALBUMIN      Taking Medications:  Diabetes Medication(s)     Insulin       insulin aspart (NOVOLOG VIAL) 100 UNITS/ML vial    USE PER INSULIN PUMP: BASAL: 0.4 UNIT/HOUR, BOLUS: ICR 1:15 GRAMS, ISF 1:100 MG/DL. MAX DAILY DOSE 40 UNITS          Healthy Coping:     Patient " Activation Measure Survey Score:  No flowsheet data found.      Care Plan and Education Provided:  Care Plan: Diabetes   Updates made by Michelle Crow RN since 11/17/2022 12:00 AM      Problem: HbA1C Not In Goal       Goal: Establish Regular Follow-Ups with PCP       Task: Discuss with PCP the recommended timing for patient's next follow up visit(s)    Responsible User: Michelle Crow RN      Task: Discuss schedule for PCP visits with patient Completed 11/17/2022   Responsible User: Michelle Crow RN      Goal: Get HbA1C Level in Goal       Task: Educate patient on diabetes education self-management topics Completed 11/17/2022   Responsible User: Michelle Crow RN      Task: Educate patient on benefits of regular glucose monitoring Completed 11/17/2022   Responsible User: Michelle Crow RN      Task: Refer patient to appropriate extended care team member, as needed (Medication Therapy Management, Behavioral Health, Physical Therapy, etc.)    Responsible User: Michelle Crow RN      Task: Discuss diabetes treatment plan with patient    Responsible User: Michelle Crow RN      Problem: Diabetes Self-Management Education Needed to Optimize Self-Care Behaviors       Goal: Understand diabetes pathophysiology and disease progression       Task: Provide education on diabetes pathophysiology and disease progression specfic to patient's diabetes type    Responsible User: Michelle Crow RN      Goal: Healthy Eating - follow a healthy eating pattern for diabetes       Task: Provide education on portion control and consistency in amount, composition and timing of food intake    Responsible User: Michelle Crow RN      Task: Provide education on managing carbohydrate intake (carbohydrate counting, plate planning method, etc.)    Responsible User: Michelle Crow RN      Task: Provide education on weight management    Responsible User: Michelle Crow RN      Task: Provide education on heart healthy eating     Responsible User: Michelle Crow RN      Task: Provide education on eating out    Responsible User: Michelle Crow RN      Task: Develop individualized healthy eating plan with patient    Responsible User: Michelle Crow RN      Goal: Being Active - get regular physical activity, working up to at least 150 minutes per week       Task: Provide education on relationship of activity to glucose and precautions to take if at risk for low glucose    Responsible User: Michelle Crow RN      Task: Discuss barriers to physical activity with patient    Responsible User: Michelle Crow RN      Task: Develop physical activity plan with patient    Responsible User: Michelle Crow RN      Task: Explore community resources including walking groups, assistance programs, and home videos    Responsible User: Michelle Crow RN      Goal: Monitoring - monitor glucose and ketones as directed       Task: Provide education on blood glucose monitoring (purpose, proper technique, frequency, glucose targets, interpreting results, when to use glucose control solution, sharps disposal)    Responsible User: Michelle Crow RN      Task: Provide education on continuous glucose monitoring (sensor placement, use of natasha or /reader, understanding glucose trends, alerts and alarms, differences between sensor glucose and blood glucose)    Responsible User: Michelle Crow RN      Task: Provide education on ketone monitoring (when to monitor, frequency, etc.)    Responsible User: Michelle Crow RN      Goal: Taking Medication - patient is consistently taking medications as directed       Task: Provide education on action of prescribed medication, including when to take and possible side effects    Responsible User: Michelle Crow RN      Task: Provide education on insulin and injectable diabetes medications, including administration, storage, site selection and rotation for injection sites Completed 11/17/2022    Responsible User: Michelle Crow RN      Task: Discuss barriers to medication adherence with patient and provide management technique ideas as appropriate Completed 11/17/2022   Responsible User: Michelle Crow RN      Task: Provide education on frequency and refill details of medications    Responsible User: Michelle Crow RN      Goal: Problem Solving - know how to prevent and manage short-term diabetes complications       Task: Provide education on high blood glucose - causes, signs/symptoms, prevention and treatment Completed 11/17/2022   Responsible User: Michelle Crow RN      Task: Provide education on low blood glucose - causes, signs/symptoms, prevention, treatment, carrying a carbohydrate source at all times, and medical identification Completed 11/17/2022   Responsible User: Michelle Crow RN      Task: Provide education on safe travel with diabetes    Responsible User: Michelle Crow RN      Task: Provide education on how to care for diabetes on sick days    Responsible User: Michelle Crow RN      Task: Provide education on when to call a health care provider    Responsible User: Michelle Crow RN      Goal: Reducing Risks - know how to prevent and treat long-term diabetes complications       Task: Provide education on major complications of diabetes, prevention, early diagnostic measures and treatment of complications    Responsible User: Michelle Crow RN      Task: Provide education on recommended care for dental, eye and foot health    Responsible User: Michelle Crow RN      Task: Provide education on Hemoglobin A1c - goals and relationship to blood glucose levels Completed 11/17/2022   Responsible User: Michelle Crow RN      Task: Provide education on recommendations for heart health - lipid levels and goals, blood pressure and goals, and aspirin therapy, if indicated    Responsible User: Michelle Crow RN      Task: Provide education on tobacco cessation    Responsible  User: Michelle Crow RN      Goal: Healthy Coping - use available resources to cope with the challenges of managing diabetes       Task: Discuss recognizing feelings about having diabetes    Responsible User: Michelle Crow RN      Task: Provide education on the benefits of making appropriate lifestyle changes Completed 11/17/2022   Responsible User: Michelle Crow RN      Task: Provide education on benefits of utilizing support systems    Responsible User: Michelle Crow RN      Task: Discuss methods for coping with stress    Responsible User: Michelle Crow RN      Task: Provide education on when to seek professional counseling    Responsible User: Michelle Crow RN Suzette Childs, RN, BSN, [facility]  11/17/2022 11:00 AM     Time Spent: 60 minutes  Encounter Type: Individual    Any diabetes medication dose changes were made via the CDE Protocol per the patient's primary care provider. A copy of this encounter was shared with the provider.

## 2022-11-17 NOTE — PATIENT INSTRUCTIONS
Diabetes Goals and Reminders    Your A1c test should be done every 3 months.  Your goal is less than 8%.   Your last result is:  Lab Results   Component Value Date    A1C 8.8 11/14/2022    A1C 10.1 11/27/2020       Your LDL cholesterol test should be done at least once a year.    Your last result is:  LDL Cholesterol Calculated   Date Value Ref Range Status   11/14/2022 56 <=100 mg/dL Final   11/27/2020 74 <100 mg/dL Final     Comment:     Desirable:       <100 mg/dl       Have blood pressure and weight checked every three months.  Your blood pressure goal is 130/80 or less.  Your last blood pressure reading was:    BP Readings from Last 1 Encounters:   11/14/22 120/86       Use your CGM to check sensor glucose readings a minimum of 4 times per day.  Your home glucose target ranges are:  Before meals:    2 hours after a meal:  Less than 180  Bedtime:  100-140 mg/dL      Avoid all tobacco.  Follow your healthy diet and exercise plan.  See the eye doctor every year.  See the dentist every six months.  Have kidney function tested yearly.    Take all medicine as prescribed.  Please let me know if you are having symptoms you don t expect or if you wish to stop any medicine.    Current pump settings:    Pump Type:  [employer] TSlim X2 insulin pump with [employer]  Insulin Type: Novolog  BASAL RATES and times:  12   AM (midnight): 0.600 units/hour  You adjusted your basal, increased to 1.5 and 1.8 unit/hour.  Unsure what total daily basal dose is at this time.    BOLUS settings:  Insulin to Carbohydrate Ratio (ICR):   1 unit per 15 grams of carbohydrate at 0:00.  Insulin Sensitivity:   100 mg/dl at 0:00.  Target Blood Glucose:   110 mg/dL.  Active Insulin: 5 hours    Follow Up Plan  Your future lab plan is:  HgA1c in February 2022.    If you need your cholesterol checked at your next appointment, you should fast 8 to 10 hours before your appointment.  Do not eat or drink anything besides water.  Drink plenty  of water and take all your usual medicine.    SUMMARY FOR TODAY'S VISIT    1.  Due to report of pump issue turning off and having to restart it, recommend calling [employer] line. Tech Support is open 24/7, so call at a less busy time and hopefully you can speak with someone sooner.     2.  Adjusted Sleep Schedule from 8:00 pm - 8:00 am to 12:00 am - 8:00 am to allow pump to give auto-corrections in the late evening to help bring glucose closer to target.     3.   Due to pattern of high blood sugar in the evenings through the night, recommend beginning to take Quick Bolus insulin with supper minimum of 3 units.       4.  Recommend taking Quick Bolus insulin for carb snacking 1 - 2 units for those evening nibbling times to help decrease risk of high blood sugar.        5.  Follow up for continued SG assessment, insulin adjustment in two weeks.    Michelle Crow RN, BSN, [facility], CPT  11/17/2022 12:50 PM

## 2022-12-26 DIAGNOSIS — Z79.4 TYPE 2 DIABETES MELLITUS WITH HYPERGLYCEMIA, WITH LONG-TERM CURRENT USE OF INSULIN (H): ICD-10-CM

## 2022-12-26 DIAGNOSIS — E11.65 TYPE 2 DIABETES MELLITUS WITH HYPERGLYCEMIA, WITH LONG-TERM CURRENT USE OF INSULIN (H): ICD-10-CM

## 2022-12-28 NOTE — TELEPHONE ENCOUNTER
"[facility] sent Rx request for the following:      Requested Prescriptions   Pending Prescriptions Disp Refills     Insulin Infusion Pump Supplies (AUTOSOFT 30 INFUSION SET) MISC [Pharmacy Med Name: AUTOSOFT 30 INF SET 13MM 43\"] 10 each 3     Sig: CHANGE EVERY 3 DAYS   Last Prescription Date:   9/22/22  Last Fill Qty/Refills:         10, R-3    Last Office Visit:              11/14/22  Future Office visit:           None    Per LOV note: Return in about 3 months (around 2/14/2023), or if symptoms worsen or fail to improve, for diabetes.    Rebekah Ramirez RN .............. 12/28/2022  10:38 AM  "

## 2022-12-29 RX ORDER — INFUSION SET FOR INSULIN PUMP
INFUSION SETS-PARAPHERNALIA MISCELLANEOUS
Qty: 10 EACH | Refills: 1 | Status: SHIPPED | OUTPATIENT
Start: 2022-12-29 | End: 2023-02-22

## 2023-01-10 DIAGNOSIS — E11.65 TYPE 2 DIABETES MELLITUS WITH HYPERGLYCEMIA, WITH LONG-TERM CURRENT USE OF INSULIN (H): ICD-10-CM

## 2023-01-10 DIAGNOSIS — Z79.4 TYPE 2 DIABETES MELLITUS WITH HYPERGLYCEMIA, WITH LONG-TERM CURRENT USE OF INSULIN (H): ICD-10-CM

## 2023-01-10 RX ORDER — INSULIN ASPART 100 [IU]/ML
INJECTION, SOLUTION INTRAVENOUS; SUBCUTANEOUS
Qty: 50 ML | Refills: 11 | Status: SHIPPED | OUTPATIENT
Start: 2023-01-10 | End: 2023-12-26

## 2023-01-10 NOTE — TELEPHONE ENCOUNTER
Note to Prescriber:  Patient states he does go over his 40 unit per day max. Sometimes up to 100 units, due to longer tubing on his pump. Is out of insulin and couldn't get more for 2 days per insurance. Need new Rx with higher max daily dose so insurance will pay.    Rebekah Ramirez RN .............. 1/10/2023  10:45 AM

## 2023-02-22 DIAGNOSIS — Z79.4 TYPE 2 DIABETES MELLITUS WITH HYPERGLYCEMIA, WITH LONG-TERM CURRENT USE OF INSULIN (H): ICD-10-CM

## 2023-02-22 DIAGNOSIS — E11.65 TYPE 2 DIABETES MELLITUS WITH HYPERGLYCEMIA, WITH LONG-TERM CURRENT USE OF INSULIN (H): ICD-10-CM

## 2023-02-22 RX ORDER — PROCHLORPERAZINE 25 MG/1
SUPPOSITORY RECTAL
Qty: 9 EACH | Refills: 0 | Status: SHIPPED | OUTPATIENT
Start: 2023-02-22 | End: 2023-05-31

## 2023-02-22 RX ORDER — INFUSION SET FOR INSULIN PUMP
INFUSION SETS-PARAPHERNALIA MISCELLANEOUS
Qty: 30 EACH | Refills: 0 | Status: SHIPPED | OUTPATIENT
Start: 2023-02-22 | End: 2023-03-31

## 2023-02-22 NOTE — TELEPHONE ENCOUNTER
"[facility] sent Rx request for the following:      Requested Prescriptions   Pending Prescriptions Disp Refills     Insulin Infusion Pump Supplies (AUTOSOFT 30 INFUSION SET) MISC [Pharmacy Med Name: AUTOSOFT 30 INF SET 13MM 43\"] 10 each 1     Sig: CHANGE EVERY 3 DAYS   Last Prescription Date:   12/9/22  Last Fill Qty/Refills:         10, R-1         Continuous Blood Gluc Sensor (DEXCOM G6 SENSOR) MISC [Pharmacy Med Name: DEXCOM G6 SENSOR  MISC]  3     Sig: CHANGE EVERY 10 DAYS   Last Prescription Date:   2/3/22  Last Fill Qty/Refills:         9, R-3      Last Office Visit:              11/14/22   Future Office visit:           None    Per LOV note: Return in about 3 months (around 2/14/2023), or if symptoms worsen or fail to improve, for diabetes.    Pt due for follow up. Routing to provider for refill consideration. Routing to  OUTREACH APPT REQUESTS pool, to assist Pt in scheduling appointment.     Rebekah Ramirez RN .............. 2/22/2023  11:15 AM      "

## 2023-03-20 ENCOUNTER — OFFICE VISIT (OUTPATIENT)
Dept: PEDIATRICS | Facility: OTHER | Age: 53
End: 2023-03-20
Attending: INTERNAL MEDICINE
Payer: COMMERCIAL

## 2023-03-20 VITALS
WEIGHT: 141 LBS | SYSTOLIC BLOOD PRESSURE: 118 MMHG | OXYGEN SATURATION: 99 % | RESPIRATION RATE: 20 BRPM | TEMPERATURE: 98.1 F | BODY MASS INDEX: 22.66 KG/M2 | HEART RATE: 84 BPM | DIASTOLIC BLOOD PRESSURE: 88 MMHG | HEIGHT: 66 IN

## 2023-03-20 DIAGNOSIS — E03.9 ACQUIRED HYPOTHYROIDISM: ICD-10-CM

## 2023-03-20 DIAGNOSIS — M51.26 HERNIATED LUMBAR INTERVERTEBRAL DISC: ICD-10-CM

## 2023-03-20 DIAGNOSIS — E11.65 UNCONTROLLED TYPE 2 DIABETES MELLITUS WITH HYPERGLYCEMIA (H): Primary | ICD-10-CM

## 2023-03-20 LAB
ANION GAP SERPL CALCULATED.3IONS-SCNC: 7 MMOL/L (ref 7–15)
BUN SERPL-MCNC: 14.1 MG/DL (ref 6–20)
CALCIUM SERPL-MCNC: 9.4 MG/DL (ref 8.6–10)
CHLORIDE SERPL-SCNC: 103 MMOL/L (ref 98–107)
CREAT SERPL-MCNC: 0.91 MG/DL (ref 0.67–1.17)
DEPRECATED HCO3 PLAS-SCNC: 28 MMOL/L (ref 22–29)
GFR SERPL CREATININE-BSD FRML MDRD: >90 ML/MIN/1.73M2
GLUCOSE SERPL-MCNC: 169 MG/DL (ref 70–99)
HBA1C MFR BLD: 9.3 % (ref 4–6.2)
HOLD SPECIMEN: NORMAL
POTASSIUM SERPL-SCNC: 5 MMOL/L (ref 3.4–5.3)
SODIUM SERPL-SCNC: 138 MMOL/L (ref 136–145)
T4 FREE SERPL-MCNC: 1.18 NG/DL (ref 0.9–1.7)
TSH SERPL DL<=0.005 MIU/L-ACNC: 4.7 UIU/ML (ref 0.3–4.2)

## 2023-03-20 PROCEDURE — 36415 COLL VENOUS BLD VENIPUNCTURE: CPT | Mod: ZL | Performed by: INTERNAL MEDICINE

## 2023-03-20 PROCEDURE — 84443 ASSAY THYROID STIM HORMONE: CPT | Mod: ZL | Performed by: INTERNAL MEDICINE

## 2023-03-20 PROCEDURE — G0463 HOSPITAL OUTPT CLINIC VISIT: HCPCS

## 2023-03-20 PROCEDURE — 84439 ASSAY OF FREE THYROXINE: CPT | Mod: ZL | Performed by: INTERNAL MEDICINE

## 2023-03-20 PROCEDURE — 82310 ASSAY OF CALCIUM: CPT | Mod: ZL | Performed by: INTERNAL MEDICINE

## 2023-03-20 PROCEDURE — 83036 HEMOGLOBIN GLYCOSYLATED A1C: CPT | Mod: ZL | Performed by: INTERNAL MEDICINE

## 2023-03-20 PROCEDURE — 99214 OFFICE O/P EST MOD 30 MIN: CPT | Performed by: INTERNAL MEDICINE

## 2023-03-20 RX ORDER — MELOXICAM 7.5 MG/1
7.5-15 TABLET ORAL DAILY PRN
Qty: 180 TABLET | Refills: 3 | Status: SHIPPED | OUTPATIENT
Start: 2023-03-20 | End: 2023-12-26

## 2023-03-20 ASSESSMENT — ANXIETY QUESTIONNAIRES
1. FEELING NERVOUS, ANXIOUS, OR ON EDGE: SEVERAL DAYS
4. TROUBLE RELAXING: SEVERAL DAYS
6. BECOMING EASILY ANNOYED OR IRRITABLE: SEVERAL DAYS
2. NOT BEING ABLE TO STOP OR CONTROL WORRYING: SEVERAL DAYS
GAD7 TOTAL SCORE: 7
8. IF YOU CHECKED OFF ANY PROBLEMS, HOW DIFFICULT HAVE THESE MADE IT FOR YOU TO DO YOUR WORK, TAKE CARE OF THINGS AT HOME, OR GET ALONG WITH OTHER PEOPLE?: SOMEWHAT DIFFICULT
7. FEELING AFRAID AS IF SOMETHING AWFUL MIGHT HAPPEN: SEVERAL DAYS
5. BEING SO RESTLESS THAT IT IS HARD TO SIT STILL: SEVERAL DAYS
IF YOU CHECKED OFF ANY PROBLEMS ON THIS QUESTIONNAIRE, HOW DIFFICULT HAVE THESE PROBLEMS MADE IT FOR YOU TO DO YOUR WORK, TAKE CARE OF THINGS AT HOME, OR GET ALONG WITH OTHER PEOPLE: SOMEWHAT DIFFICULT
7. FEELING AFRAID AS IF SOMETHING AWFUL MIGHT HAPPEN: SEVERAL DAYS
3. WORRYING TOO MUCH ABOUT DIFFERENT THINGS: SEVERAL DAYS

## 2023-03-20 ASSESSMENT — PATIENT HEALTH QUESTIONNAIRE - PHQ9
SUM OF ALL RESPONSES TO PHQ QUESTIONS 1-9: 7
SUM OF ALL RESPONSES TO PHQ QUESTIONS 1-9: 7
10. IF YOU CHECKED OFF ANY PROBLEMS, HOW DIFFICULT HAVE THESE PROBLEMS MADE IT FOR YOU TO DO YOUR WORK, TAKE CARE OF THINGS AT HOME, OR GET ALONG WITH OTHER PEOPLE: SOMEWHAT DIFFICULT

## 2023-03-20 ASSESSMENT — PAIN SCALES - GENERAL: PAINLEVEL: SEVERE PAIN (7)

## 2023-03-20 NOTE — NURSING NOTE
"Chief Complaint   Patient presents with     Diabetes         Initial /88   Pulse 84   Temp 98.1  F (36.7  C) (Tympanic)   Resp 20   Ht 1.683 m (5' 6.25\")   Wt 64 kg (141 lb)   SpO2 99%   BMI 22.59 kg/m   Estimated body mass index is 22.59 kg/m  as calculated from the following:    Height as of this encounter: 1.683 m (5' 6.25\").    Weight as of this encounter: 64 kg (141 lb).         Norma J. Gosselin, LPN   "

## 2023-03-20 NOTE — LETTER
March 20, 2023      Rick Contreras  [address]   [place] [address]        Dear ,    We are writing to inform you of your test results.    A1c has worsened.  Work to reduce carbohydrates including sandwiches, alcohol.  Work with Michelle of diabetes education.    Signed, Adebayo Dubois MD, FAAP, FACP  Internal Medicine & Pediatrics      Resulted Orders   TSH with free T4 reflex   Result Value Ref Range    TSH 4.70 (H) 0.30 - 4.20 uIU/mL   Hemoglobin A1c   Result Value Ref Range    Hemoglobin A1C 9.3 (H) 4.0 - 6.2 %   Basic metabolic panel   Result Value Ref Range    Sodium 138 136 - 145 mmol/L    Potassium 5.0 3.4 - 5.3 mmol/L    Chloride 103 98 - 107 mmol/L    Carbon Dioxide (CO2) 28 22 - 29 mmol/L    Anion Gap 7 7 - 15 mmol/L    Urea Nitrogen 14.1 6.0 - 20.0 mg/dL    Creatinine 0.91 0.67 - 1.17 mg/dL    Calcium 9.4 8.6 - 10.0 mg/dL    Glucose 169 (H) 70 - 99 mg/dL    GFR Estimate >90 >60 mL/min/1.73m2      Comment:      eGFR calculated using 2021 CKD-EPI equation.   Extra SST Tube (LAB USE ONLY)   Result Value Ref Range    Hold Specimen JIC    T4 free   Result Value Ref Range    Free T4 1.18 0.90 - 1.70 ng/dL       If you have any questions or concerns, please call the clinic at the number listed above.       Sincerely,      Adebayo Dubois MD

## 2023-03-20 NOTE — Clinical Note
Please abstract the following data from this visit with this patient into the appropriate field in Epic:  Tests that can be patient reported without a hard copy:  Eye exam with ophthalmology on this date: August 2022 Exam Location: [facility]- patient reported  Other Tests found in the patient's chart through Chart Review/Care Everywhere:  Eye exam with ophthalmology done by this group  [facility] on this date: August 2022- patient reported  Note to Abstraction: If this section is blank, no results were found via Chart Review/Care Everywhere.

## 2023-03-20 NOTE — PROGRESS NOTES
"Assessment & Plan   1. Uncontrolled type 2 diabetes mellitus with hyperglycemia (H)  We discussed the management of diabetes today.  Lifestyle modification recommended.  Close follow-up with diabetes education is recommended.  - FOOT EXAM  - Miscellaneous Order for DME - ONLY FOR DME  - Basic metabolic panel; Future  - Hemoglobin A1c; Future  - Hemoglobin A1c  - Basic metabolic panel  - Extra SST Tube (LAB USE ONLY)    2. Herniated lumbar intervertebral disc  At goal, no change.  - meloxicam (MOBIC) 7.5 MG tablet; Take 1-2 tablets (7.5-15 mg) by mouth daily as needed for pain  Dispense: 180 tablet; Refill: 3    3. Acquired hypothyroidism  - TSH with free T4 reflex; Future  - TSH with free T4 reflex  - T4 free          Signed, Adebayo Dubois MD, FAAP, FACP  Internal Medicine & Pediatrics    Subjective   Rick Contreras is a 53 year old male who presents for diabetes check.  He thinks things are going pretty fine.  Morning blood sugars are around 170, and in the nighttime it never goes over 270.  He uses his CGM as directed.  He endorses drinking mixed drinks in the evening (whiskey with diet Mountain Dew).  He has been working to cut back.  He tends to have snacks in the evening ([employer] sandwiches.)    Objective   Vitals: /88   Pulse 84   Temp 98.1  F (36.7  C) (Tympanic)   Resp 20   Ht 1.683 m (5' 6.25\")   Wt 64 kg (141 lb)   SpO2 99%   BMI 22.59 kg/m      Foot Exam:  3/20/2023  Intact to monofilament bilaterally.  Skin intact without erythema.    Review and Analysis of Data   I personally reviewed the following:  External notes: No  Results: Yes Diabetic labs reviewed  Use of an independent historian: No  Independent review of a test performed by another physician: No  Discussion of management with another physician: No  Moderate risk of morbidity from additional diagnostic testing and/or treatment.    Patient currently uses a Dexcom G6 CGM for continuous monitoring of glucose.   Patient injects or " boluses insulin 3 or more times daily before breakfast, before lunch, before dinner, and bedtime.  Patient requires frequent adjustments to their insulin treatment regimen on the basis of therapeutic CGM testing results.

## 2023-03-27 DIAGNOSIS — E11.65 TYPE 2 DIABETES MELLITUS WITH HYPERGLYCEMIA, WITH LONG-TERM CURRENT USE OF INSULIN (H): ICD-10-CM

## 2023-03-27 DIAGNOSIS — Z79.4 TYPE 2 DIABETES MELLITUS WITH HYPERGLYCEMIA, WITH LONG-TERM CURRENT USE OF INSULIN (H): ICD-10-CM

## 2023-03-28 RX ORDER — PROCHLORPERAZINE 25 MG/1
SUPPOSITORY RECTAL
Qty: 1 EACH | Refills: 3 | Status: SHIPPED | OUTPATIENT
Start: 2023-03-28 | End: 2024-03-26

## 2023-03-28 NOTE — TELEPHONE ENCOUNTER
[facility] sent Rx request for the following:    DEXCOM G6 TRANSMITTER  MISC  Last Prescription Date:   2/3/22  Last Fill Qty/Refills:         1, R-3    Last Office Visit:              3/20/23   Future Office visit:           None   Shaniqua Reed RN on 3/28/2023 at 3:32 PM

## 2023-03-30 DIAGNOSIS — Z79.4 TYPE 2 DIABETES MELLITUS WITH HYPERGLYCEMIA, WITH LONG-TERM CURRENT USE OF INSULIN (H): ICD-10-CM

## 2023-03-30 DIAGNOSIS — E11.65 TYPE 2 DIABETES MELLITUS WITH HYPERGLYCEMIA, WITH LONG-TERM CURRENT USE OF INSULIN (H): ICD-10-CM

## 2023-03-31 RX ORDER — INFUSION SET FOR INSULIN PUMP
INFUSION SETS-PARAPHERNALIA MISCELLANEOUS
Qty: 30 EACH | Refills: 1 | Status: SHIPPED | OUTPATIENT
Start: 2023-03-31 | End: 2023-10-04

## 2023-03-31 NOTE — TELEPHONE ENCOUNTER
"[facility] sent Rx request for the following:      Requested Prescriptions   Pending Prescriptions Disp Refills     Insulin Infusion Pump Supplies (AUTOSOFT 30 INFUSION SET) MISC [Pharmacy Med Name: AUTOSOFT 30 INF SET 13MM 43\"] 30 each 0     Sig: CHANGE EVERY 3 DAYS   Last Prescription Date:   2/22/23  Last Fill Qty/Refills:         30, R-0    Last Office Visit:              3/20/23   Future Office visit:           None    Rebekah Ramirez RN .............. 3/31/2023  2:48 PM        "

## 2023-05-22 ENCOUNTER — OFFICE VISIT (OUTPATIENT)
Dept: PEDIATRICS | Facility: OTHER | Age: 53
End: 2023-05-22
Attending: INTERNAL MEDICINE
Payer: COMMERCIAL

## 2023-05-22 VITALS
WEIGHT: 133.2 LBS | OXYGEN SATURATION: 100 % | TEMPERATURE: 97.3 F | DIASTOLIC BLOOD PRESSURE: 84 MMHG | HEIGHT: 66 IN | RESPIRATION RATE: 16 BRPM | SYSTOLIC BLOOD PRESSURE: 130 MMHG | HEART RATE: 100 BPM | BODY MASS INDEX: 21.41 KG/M2

## 2023-05-22 DIAGNOSIS — K59.09 CHRONIC CONSTIPATION: ICD-10-CM

## 2023-05-22 DIAGNOSIS — E03.9 ACQUIRED HYPOTHYROIDISM: ICD-10-CM

## 2023-05-22 DIAGNOSIS — Z78.9 ALCOHOL USE: ICD-10-CM

## 2023-05-22 DIAGNOSIS — E11.65 UNCONTROLLED TYPE 2 DIABETES MELLITUS WITH HYPERGLYCEMIA (H): Primary | ICD-10-CM

## 2023-05-22 PROCEDURE — G0463 HOSPITAL OUTPT CLINIC VISIT: HCPCS | Performed by: INTERNAL MEDICINE

## 2023-05-22 PROCEDURE — 99214 OFFICE O/P EST MOD 30 MIN: CPT | Performed by: INTERNAL MEDICINE

## 2023-05-22 RX ORDER — POLYETHYLENE GLYCOL 3350 17 G/17G
1 POWDER, FOR SOLUTION ORAL DAILY
Qty: 850 G | Refills: 11 | Status: SHIPPED | OUTPATIENT
Start: 2023-05-22 | End: 2023-12-26

## 2023-05-22 RX ORDER — BUPROPION HYDROCHLORIDE 150 MG/1
1 TABLET ORAL
COMMUNITY
Start: 2023-05-10

## 2023-05-22 ASSESSMENT — PAIN SCALES - GENERAL: PAINLEVEL: NO PAIN (0)

## 2023-05-22 NOTE — PROGRESS NOTES
Assessment & Plan   1. Uncontrolled type 2 diabetes mellitus with hyperglycemia (H)  His diabetes is uncontrolled.  Looking back his A1c's have been uncontrolled for the last several years.  I believe that he has been living with blood sugars in the 250-300 average in recently due to the stress of his teeth being removed this has increased.  There is no evidence for infection.  I reviewed his pump settings with him today and we increased his basal rate from 2.5 units/h up to 3 units/h.  I recommend close follow-up with Michelle of diabetes education for ongoing pump adjustments.  I offered a referral to endocrinology and the patient declined.  Lifestyle modification recommended including reduced intake of carbohydrate.  I do believe he is progressing towards type 1 diabetes and am ordering a C-peptide for his next blood draw.  - C-peptide; Future  - [facility] Adult Diabetes Educator Referral; Future  - Hemoglobin A1c; Future  - Basic metabolic panel; Future  - glucose (BD GLUCOSE) 4 g chewable tablet; Take 1 tablet by mouth every hour as needed for low blood sugar  Dispense: 30 tablet; Refill: 11    2. Acquired hypothyroidism  At goal, no change.  - TSH with free T4 reflex; Future    3. Alcohol use  Recommend abstinence    4. Chronic constipation  - polyethylene glycol (MIRALAX) 17 GM/Dose powder; Take 17 g (1 Capful) by mouth daily  Dispense: 850 g; Refill: 11      Patient Instructions    -- We increased your rate from 2.5 units/hour to 3.0 units/hour   -- Close follow-up with Michelle, recommend monthly visits until glycemic control improves, work on settings for illness, exercise, etc.    -- Lab due in about 1 month, 1 hour before visit   -- Follow-up with Dr. Dubois in 1 month   -- Work to reduce carbs       -- Start polyethylene glycol (MiraLax) 2 capful two times a day for a few days, then cut back dose.  Most likely you'll be using 1 capful daily after a few days   -- MiraLax is safe for you to adjust the dose  "yourself at home. Changes in dose take 12-24 hours to show an effect   -- Progression will be small hard pellet stool, hard log stool, soft stool.  Expect some liquid stool as well.  Goal soft formed daily stool (applesauce consistency stools)   -- After 2-3 days, if you still feel constipated the next step up is to add Senna 1-2 tablets twice a day   -- Use MiraLax daily for at least a month to allow colon to regain strength   -- Drink more water   -- Eat more fruits and vegetables   -- No fiber supplements until at least 1 month out.  Fiber containing foods okay.   -- MiraLax is safe to use indefinitely   -- After 1 month, consider switching from MiraLax to daily fiber supplement (eg psyllium/Metamucil or methylcellulose/Citrucel 1 tbsp daily in at least 8 oz water).        Return in about 1 month (around 6/22/2023), or if symptoms worsen or fail to improve.    Signed, Adebayo Dubois MD, FAAP, FACP  Internal Medicine & Pediatrics    Subjective   Rick Contreras is a 53 year old male who presents for check diabetes.  His sugars been really high ever since he had his teeth removed on 4/13/2023.  Since then they have been in the 500s.  He uses his insulin pump.  Sometimes his sugars will go lower and he has to eat.  He has been really constipated lately.  He is cut down to 3 mixed drinks a day which he mixes around 1 shot of alcohol and diet Mountain Dew.  He drinks diet Yeison-Aid to stay hydrated.    Objective   Vitals: /84   Pulse 100   Temp 97.3  F (36.3  C) (Tympanic)   Resp 16   Ht 1.683 m (5' 6.25\")   Wt 60.4 kg (133 lb 3.2 oz)   SpO2 100%   BMI 21.34 kg/m        Review and Analysis of Data   I personally reviewed the following:  External notes: No  Results: Yes Diabetic labs are reviewed  Use of an independent historian: No  Independent review of a test performed by another physician: No  Discussion of management with another physician: No  High risk of morbidity from additional diagnostic testing " and/or treatment.

## 2023-05-22 NOTE — PATIENT INSTRUCTIONS
-- We increased your rate from 2.5 units/hour to 3.0 units/hour   -- Close follow-up with Michelle, recommend monthly visits until glycemic control improves, work on settings for illness, exercise, etc.    -- Lab due in about 1 month, 1 hour before visit   -- Follow-up with Dr. Dubois in 1 month   -- Work to reduce carbs       -- Start polyethylene glycol (MiraLax) 2 capful two times a day for a few days, then cut back dose.  Most likely you'll be using 1 capful daily after a few days   -- MiraLax is safe for you to adjust the dose yourself at home. Changes in dose take 12-24 hours to show an effect   -- Progression will be small hard pellet stool, hard log stool, soft stool.  Expect some liquid stool as well.  Goal soft formed daily stool (applesauce consistency stools)   -- After 2-3 days, if you still feel constipated the next step up is to add Senna 1-2 tablets twice a day   -- Use MiraLax daily for at least a month to allow colon to regain strength   -- Drink more water   -- Eat more fruits and vegetables   -- No fiber supplements until at least 1 month out.  Fiber containing foods okay.   -- MiraLax is safe to use indefinitely   -- After 1 month, consider switching from MiraLax to daily fiber supplement (eg psyllium/Metamucil or methylcellulose/Citrucel 1 tbsp daily in at least 8 oz water).

## 2023-05-22 NOTE — NURSING NOTE
"Chief Complaint   Patient presents with     Diabetes     High blood sugar reading 500's now 300's     Constipation         Initial /84   Pulse 100   Temp 97.3  F (36.3  C) (Tympanic)   Resp 16   Ht 1.683 m (5' 6.25\")   Wt 60.4 kg (133 lb 3.2 oz)   SpO2 100%   BMI 21.34 kg/m   Estimated body mass index is 21.34 kg/m  as calculated from the following:    Height as of this encounter: 1.683 m (5' 6.25\").    Weight as of this encounter: 60.4 kg (133 lb 3.2 oz).         Norma J. Gosselin, KIRBY   "

## 2023-05-25 DIAGNOSIS — E11.65 TYPE 2 DIABETES MELLITUS WITH HYPERGLYCEMIA, WITH LONG-TERM CURRENT USE OF INSULIN (H): ICD-10-CM

## 2023-05-25 DIAGNOSIS — Z79.4 TYPE 2 DIABETES MELLITUS WITH HYPERGLYCEMIA, WITH LONG-TERM CURRENT USE OF INSULIN (H): ICD-10-CM

## 2023-05-31 RX ORDER — PROCHLORPERAZINE 25 MG/1
SUPPOSITORY RECTAL
Qty: 9 EACH | Refills: 0 | Status: SHIPPED | OUTPATIENT
Start: 2023-05-31 | End: 2023-08-27

## 2023-05-31 NOTE — TELEPHONE ENCOUNTER
[facility] sent Rx request for the following:      Requested Prescriptions   Pending Prescriptions Disp Refills     Continuous Blood Gluc Sensor (DEXCOM G6 SENSOR) MISC [Pharmacy Med Name: DEXCOM G6 SENSOR  MISC]  0     Sig: CHANGE EVERY 10 DAYS   Last Prescription Date:   2/22/23  Last Fill Qty/Refills:         9, R-0    Last Office Visit:              5/22/23   Future Office visit:             Next 5 appointments (look out 90 days)    Jun 30, 2023  8:00 AM  SHORT with Adebayo Dubois MD  [facility] ) [address]  [place] [id number] [phone]        Per LOV note:  Follow-up with Dr. Dubois in 1 month    In clinical absence of patient's primary, Adebayo Dubois, patient is requesting that this message be sent to the covering provider for consideration please.    Rebekah Ramirez RN .............. 5/31/2023  2:56 PM

## 2023-08-24 ENCOUNTER — HOSPITAL ENCOUNTER (EMERGENCY)
Facility: OTHER | Age: 53
Discharge: HOME | End: 2023-08-24
Attending: FAMILY MEDICINE | Admitting: FAMILY MEDICINE
Payer: COMMERCIAL

## 2023-08-24 ENCOUNTER — NURSE TRIAGE (OUTPATIENT)
Dept: NURSING | Facility: CLINIC | Age: 53
End: 2023-08-24
Payer: COMMERCIAL

## 2023-08-24 VITALS
DIASTOLIC BLOOD PRESSURE: 89 MMHG | TEMPERATURE: 96.7 F | SYSTOLIC BLOOD PRESSURE: 146 MMHG | HEART RATE: 78 BPM | RESPIRATION RATE: 18 BRPM | OXYGEN SATURATION: 98 %

## 2023-08-24 DIAGNOSIS — K64.4 EXTERNAL HEMORRHOIDS: ICD-10-CM

## 2023-08-24 LAB
HGB BLD-MCNC: 12.8 G/DL (ref 13.3–17.7)
HOLD SPECIMEN: NORMAL

## 2023-08-24 PROCEDURE — 36415 COLL VENOUS BLD VENIPUNCTURE: CPT | Performed by: FAMILY MEDICINE

## 2023-08-24 PROCEDURE — 99282 EMERGENCY DEPT VISIT SF MDM: CPT | Performed by: FAMILY MEDICINE

## 2023-08-24 PROCEDURE — 99283 EMERGENCY DEPT VISIT LOW MDM: CPT | Performed by: FAMILY MEDICINE

## 2023-08-24 PROCEDURE — 85018 HEMOGLOBIN: CPT | Performed by: FAMILY MEDICINE

## 2023-08-24 ASSESSMENT — ENCOUNTER SYMPTOMS
RECTAL PAIN: 0
BLOOD IN STOOL: 1

## 2023-08-24 ASSESSMENT — ACTIVITIES OF DAILY LIVING (ADL): ADLS_ACUITY_SCORE: 35

## 2023-08-24 NOTE — DISCHARGE INSTRUCTIONS
You have a hemorrhoid that burst.  I put in a referral to surgery see can talk about having it removed.  There are several ways this can be done.  Please cut down on your alcohol use.  Your hemoglobin was a little bit lower than normal probably as a result of the bleeding you had.  If you have any return of your bleeding feeling lightheaded dizzy or any other concerns please return to the emergency room.  You should also have your usual screening colonoscopy done.  This can be discussed with the surgery team as well.

## 2023-08-24 NOTE — TELEPHONE ENCOUNTER
"    Nurse Triage SBAR    Is this a 2nd Level Triage? NO    Situation: Blood in stool.     Background: Blood in stool. Continuing to bleed down patient's legs. Patient is not constipated. Has had some blood in stools previously but nothing like this.  Takes aspirin. Reports he is an alcoholic. Patient is diabetic.    Assessment: Bleeding down legs; reports toilet bowl is bright red.Feels like there are some hemorrhoids sticking out. States he \"feels completely fine\". .     Protocol Recommended Disposition:   Go to ED Now    Recommendation: Due to extend of bleeding, and continued bleeding, patient should be evaluated in the emergency department. Patient states his mother will drive him to the [facility] ED.      Silvana Snow RN on 8/24/2023 at 1:39 AM      Reason for Disposition   [1] MODERATE rectal bleeding (small blood clots, passing blood without stool, or toilet water turns red) AND [2] more than once a day   SEVERE rectal bleeding (large blood clots; constant or on and off bleeding)    Additional Information   Negative: Shock suspected (e.g., cold/pale/clammy skin, too weak to stand, low BP, rapid pulse)   Negative: Difficult to awaken or acting confused (e.g., disoriented, slurred speech)   Negative: Passed out (i.e., lost consciousness, collapsed and was not responding)   Negative: [1] Vomiting AND [2] contains red blood or black (\"coffee ground\") material  (Exception: few red streaks in vomit that only happened once)   Negative: Sounds like a life-threatening emergency to the triager   Negative: Diarrhea is main symptom   Negative: Stool color other than brown or tan is main concern  (no bleeding and no melena)   Negative: SEVERE dizziness (e.g., unable to stand, requires support to walk, feels like passing out now)    Protocols used: Rectal Bleeding-A-AH    "

## 2023-08-24 NOTE — ED TRIAGE NOTES
Arrived from home via private vehicle.  CC of rectal bleeding for the past two hours.  Does have an extensive hemorrhoid history, and has had bleeding in the past, but this is more than usual. This episode started while he was on the toilet having a BM.     Triage Assessment       Row Name 08/24/23 0204       Triage Assessment (Adult)    Airway WDL WDL       Respiratory WDL    Respiratory WDL WDL       Skin Circulation/Temperature WDL    Skin Circulation/Temperature WDL WDL       Cardiac WDL    Cardiac WDL WDL       Peripheral/Neurovascular WDL    Peripheral Neurovascular WDL WDL       Cognitive/Neuro/Behavioral WDL    Cognitive/Neuro/Behavioral WDL WDL

## 2023-08-24 NOTE — ED PROVIDER NOTES
History     Chief Complaint   Patient presents with    Rectal Bleeding     HPI  Rick Contreras is a 53 year old male who presents for 1 episode of bright red blood per rectum.  He notes history of hemorrhoids.  He had a couple of alcoholic beverages tonight.  Denies any vomiting.  Noticed a little bit of dried blood on his leg and thought he should be evaluated.  He denies rectal pain.  No recent constipation or diarrhea.  No weakness fevers or chills.  No weight loss.  Patient took Ambien, Tylenol and meloxicam prior to arrival.  He is up walking in the hallway and wanting to go home because he is worried he is going to fall asleep.  Mom drove him in and is waiting for him to be discharged home and will drive him.  He has never had a colonoscopy.    Allergies:  Allergies   Allergen Reactions    Lisinopril Dizziness    Metformin Nausea and Other (See Comments)     Abdominal cramping       Problem List:    Patient Active Problem List    Diagnosis Date Noted    Alcohol use 02/03/2022     Priority: Medium    Gastroesophageal reflux disease with esophagitis, unspecified whether hemorrhage 02/03/2022     Priority: Medium    Acquired hypothyroidism 02/03/2022     Priority: Medium    Dizziness 03/19/2020     Priority: Medium    Uncontrolled type 2 diabetes mellitus with hyperglycemia (H) 05/31/2019     Priority: Medium     IMO Regulatory Load OCT 2020      History of tobacco use disorder 03/12/2018     Priority: Medium    Rectal bleeding 10/25/2017     Priority: Medium    Back pain, chronic 12/29/2014     Priority: Medium    Herniated lumbar intervertebral disc 12/05/2014     Priority: Medium    Constipation 02/07/2014     Priority: Medium     Overview:   Chronic          Past Medical History:    Past Medical History:   Diagnosis Date    Other constipation     Personal history of other medical treatment (CODE)        Past Surgical History:    Past Surgical History:   Procedure Laterality Date    EXTRACTION(S) DENTAL    "   No Comments Provided    HERNIORRHAPHY INGUINAL Bilateral 3/20/2018    Bilateral Inguinal Hernia Repair with ProlLite Mesh;  Surgeon: Immanuel Calzada MD    VASECTOMY             Family History:    Family History   Problem Relation Age of Onset    Diabetes Mother         Diabetes    Diabetes Father         Diabetes    Cancer Father         Cancer,Skin, throat    Anesthesia Reaction No family hx of     Clotting Disorder No family hx of     Cerebrovascular Disease No family hx of     Colon Cancer No family hx of     Prostate Cancer No family hx of        Social History:  Marital Status:   [4]  Social History     Tobacco Use    Smoking status: Former     Packs/day: 0.50     Years: 20.00     Pack years: 10.00     Types: Cigarettes     Quit date: 2015     Years since quittin.9    Smokeless tobacco: Never    Tobacco comments:     Quit smoking: has chantix   Vaping Use    Vaping Use: Never used   Substance Use Topics    Alcohol use: Yes     Alcohol/week: 10.0 - 15.0 standard drinks of alcohol     Types: 10 - 15 Standard drinks or equivalent per week     Comment: 2 drinks a day    Drug use: No        Medications:    Continuous Blood Gluc Sensor (DEXCOM G6 SENSOR) MISC  aspirin 81 MG EC tablet  blood glucose (NO BRAND SPECIFIED) lancets standard  blood glucose (NO BRAND SPECIFIED) lancets standard  blood glucose (NO BRAND SPECIFIED) test strip  blood glucose monitoring (NO BRAND SPECIFIED) meter device kit  buPROPion (WELLBUTRIN XL) 150 MG 24 hr tablet  clotrimazole (LOTRIMIN) 1 % external cream  Continuous Blood Gluc Transmit (DEXCOM G6 TRANSMITTER) MISC  glucose (BD GLUCOSE) 4 g chewable tablet  insulin aspart (NOVOLOG VIAL) 100 UNITS/ML vial  Insulin Infusion Pump Supplies (AUTOSOFT 30 INFUSION SET) MISC  Insulin Infusion Pump Supplies (T:SLIM X2 3ML CARTRIDGE) MISC  insulin syringe-needle U-100 (31G X 5/16\" 1 ML) 31G X 5/16\" 1 ML miscellaneous  IV Sets-Tubing (INFUSION SET 43\") MISC  levothyroxine " (EUTHYROX) 100 MCG tablet  meloxicam (MOBIC) 7.5 MG tablet  mupirocin (BACTROBAN) 2 % external ointment  polyethylene glycol (MIRALAX) 17 GM/Dose powder  simvastatin (ZOCOR) 20 MG tablet  zolpidem (AMBIEN) 10 MG tablet          Review of Systems   Gastrointestinal:  Positive for blood in stool. Negative for rectal pain.       Physical Exam   BP: (!) 146/89  Pulse: 78  Temp: (!) 96.7  F (35.9  C)  Resp: 18  SpO2: 98 %      Physical Exam  Constitutional:       Appearance: He is normal weight.      Comments: Appears slightly intoxicated.  underweight but not cachectic.   HENT:      Head: Normocephalic and atraumatic.   Cardiovascular:      Rate and Rhythm: Normal rate and regular rhythm.   Pulmonary:      Effort: Pulmonary effort is normal.      Breath sounds: Normal breath sounds.   Genitourinary:     Comments: Large grape sized hemorrhoid with small amount of dried blood.  Did not pursue further rectal exam for patient comfort.  Musculoskeletal:         General: Normal range of motion.   Skin:     General: Skin is warm and dry.   Neurological:      Mental Status: He is alert.         ED Course              ED Course as of 08/24/23 0313   Thu Aug 24, 2023   0220 Patient presents for an episode of bright red blood in the toilet bowl water.  He has hemorrhoids.  He uses alcohol.  Denies abdominal pain or vomiting.   0222 Rectal exam done.  Large hemorrhoid with dried blood seen.  Did not do Hemoccult     Procedures              Critical Care time:  none               Results for orders placed or performed during the hospital encounter of 08/24/23 (from the past 24 hour(s))   Hemoglobin   Result Value Ref Range    Hemoglobin 12.8 (L) 13.3 - 17.7 g/dL   Extra Tube    Narrative    The following orders were created for panel order Extra Tube.  Procedure                               Abnormality         Status                     ---------                               -----------         ------                     Extra  Blue Top Tube[[id number]]                              In process                 Extra Red Top Tube[[id number]]                               In process                 Extra Green Top (Lithium...[[id number]]                      In process                 Extra Green Top (Lithium...[[id number]]                      In process                   Please view results for these tests on the individual orders.       Medications - No data to display    Assessments & Plan (with Medical Decision Making)     I have reviewed the nursing notes.    I have reviewed the findings, diagnosis, plan and need for follow up with the patient.           Medical Decision Making  The patient's presentation was of straightforward complexity (a clearly self-limited or minor problem).    The patient's evaluation involved:  history and exam without other MDM data elements    The patient's management necessitated only low risk treatment].        New Prescriptions    No medications on file       Final diagnoses:   External hemorrhoids   Hemoglobin slightly lower than his baseline.  However, he is also a known chronic alcohol user.  Could be chronic versus acute on chronic.  He is not hypotensive or tachycardic.  He had no further bleeding episodes here.  He desired to go home.  Of significance he has not had his routine screening colonoscopy done.  I recommend he have this done.  Additionally, he can speak with the general surgery team about having the hemorrhoid removed.  He was instructed to return if he has any return of his bleeding feeling lightheaded dizzy short of breath etc.  Please see AVS for details.    8/24/2023   [facility]       Anel Gonzalez DO  08/24/23 0313

## 2023-08-25 DIAGNOSIS — Z79.4 TYPE 2 DIABETES MELLITUS WITH HYPERGLYCEMIA, WITH LONG-TERM CURRENT USE OF INSULIN (H): ICD-10-CM

## 2023-08-25 DIAGNOSIS — E11.65 TYPE 2 DIABETES MELLITUS WITH HYPERGLYCEMIA, WITH LONG-TERM CURRENT USE OF INSULIN (H): ICD-10-CM

## 2023-08-27 RX ORDER — PROCHLORPERAZINE 25 MG/1
SUPPOSITORY RECTAL
Qty: 9 EACH | Refills: 11 | Status: SHIPPED | OUTPATIENT
Start: 2023-08-27 | End: 2024-08-29

## 2023-09-27 DIAGNOSIS — E11.65 TYPE 2 DIABETES MELLITUS WITH HYPERGLYCEMIA, WITH LONG-TERM CURRENT USE OF INSULIN (H): ICD-10-CM

## 2023-09-27 DIAGNOSIS — Z79.4 TYPE 2 DIABETES MELLITUS WITH HYPERGLYCEMIA, WITH LONG-TERM CURRENT USE OF INSULIN (H): ICD-10-CM

## 2023-10-03 NOTE — TELEPHONE ENCOUNTER
Patient waiting for orders to be signed to get his refills. Patient also has questions on his supplies.    Anne Swain on 10/3/2023 at 9:07 AM

## 2023-10-04 RX ORDER — INSULIN PUMP CARTRIDGE
CARTRIDGE (EA) SUBCUTANEOUS
Qty: 30 EACH | Refills: 11 | Status: SHIPPED | OUTPATIENT
Start: 2023-10-04

## 2023-10-04 RX ORDER — INFUSION SET FOR INSULIN PUMP
INFUSION SETS-PARAPHERNALIA MISCELLANEOUS
Qty: 30 EACH | Refills: 1 | Status: SHIPPED | OUTPATIENT
Start: 2023-10-04 | End: 2024-03-26

## 2023-10-04 NOTE — TELEPHONE ENCOUNTER
"[facility] sent Rx request for the following:      Requested Prescriptions   Pending Prescriptions Disp Refills    Insulin Infusion Pump Supplies (T:SLIM X2 3ML CARTRIDGE) MISC [Pharmacy Med Name: TSLIM X2 3ML CARTRIDGE MISC]  11     Sig: CHANGE EVERY 3 DAYS       There is no refill protocol information for this order       Insulin Infusion Pump Supplies (AUTOSOFT 30 INFUSION SET) MISC [Pharmacy Med Name: AUTOSOFT 30 INF SET 13MM 43\"]  1     Sig: CHANGE EVERY 3 DAYS       There is no refill protocol information for this order        Last Prescription Date:   3/31/23, 9/22/22  Last Fill Qty/Refills:         30, R-1  , 11  Last Office Visit:              5/22/23   Future Office visit:           none     Shaniqua Reed RN on 10/4/2023 at 8:44 AM     "

## 2023-10-24 DIAGNOSIS — E11.65 TYPE 2 DIABETES MELLITUS WITH HYPERGLYCEMIA, WITH LONG-TERM CURRENT USE OF INSULIN (H): Primary | ICD-10-CM

## 2023-10-24 DIAGNOSIS — Z79.4 TYPE 2 DIABETES MELLITUS WITH HYPERGLYCEMIA, WITH LONG-TERM CURRENT USE OF INSULIN (H): Primary | ICD-10-CM

## 2023-10-27 RX ORDER — SIMVASTATIN 20 MG
20 TABLET ORAL AT BEDTIME
Qty: 90 TABLET | Refills: 0 | Status: SHIPPED | OUTPATIENT
Start: 2023-10-27 | End: 2023-12-26

## 2023-10-27 NOTE — TELEPHONE ENCOUNTER
[facility] sent Rx request for the following:      Requested Prescriptions   Pending Prescriptions Disp Refills    simvastatin (ZOCOR) 20 MG tablet [Pharmacy Med Name: Simvastatin 20 MG Oral Tablet] 90 tablet 0     Sig: TAKE 1 TABLET BY MOUTH AT BEDTIME     Last Prescription Date:   11/14/22  Last Fill Qty/Refills:         90, R-4    Last Office Visit:              5/22/23   Future Office visit:           none    Patient needs lipid panel. Will route to provider to order this. Will route to scheduling to set up lab only appointment.  90 refill sent to pharmacy per protocol.   Khushi Dixon RN on 10/27/2023 at 3:17 PM

## 2023-11-10 ENCOUNTER — LAB (OUTPATIENT)
Dept: LAB | Facility: OTHER | Age: 53
End: 2023-11-10
Attending: INTERNAL MEDICINE
Payer: COMMERCIAL

## 2023-11-10 DIAGNOSIS — E03.9 ACQUIRED HYPOTHYROIDISM: ICD-10-CM

## 2023-11-10 DIAGNOSIS — E11.65 UNCONTROLLED TYPE 2 DIABETES MELLITUS WITH HYPERGLYCEMIA (H): ICD-10-CM

## 2023-11-10 LAB
ANION GAP SERPL CALCULATED.3IONS-SCNC: 7 MMOL/L (ref 7–15)
BUN SERPL-MCNC: 13.7 MG/DL (ref 6–20)
CALCIUM SERPL-MCNC: 9.3 MG/DL (ref 8.6–10)
CHLORIDE SERPL-SCNC: 103 MMOL/L (ref 98–107)
CREAT SERPL-MCNC: 1.05 MG/DL (ref 0.67–1.17)
DEPRECATED HCO3 PLAS-SCNC: 28 MMOL/L (ref 22–29)
EGFRCR SERPLBLD CKD-EPI 2021: 85 ML/MIN/1.73M2
GLUCOSE SERPL-MCNC: 284 MG/DL (ref 70–99)
HBA1C MFR BLD: 9.2 % (ref 4–6.2)
POTASSIUM SERPL-SCNC: 5 MMOL/L (ref 3.4–5.3)
SODIUM SERPL-SCNC: 138 MMOL/L (ref 135–145)
T4 FREE SERPL-MCNC: 1.09 NG/DL (ref 0.9–1.7)
TSH SERPL DL<=0.005 MIU/L-ACNC: 8.9 UIU/ML (ref 0.3–4.2)

## 2023-11-10 PROCEDURE — 80048 BASIC METABOLIC PNL TOTAL CA: CPT | Mod: ZL

## 2023-11-10 PROCEDURE — 84443 ASSAY THYROID STIM HORMONE: CPT | Mod: ZL

## 2023-11-10 PROCEDURE — 36415 COLL VENOUS BLD VENIPUNCTURE: CPT | Mod: ZL

## 2023-11-10 PROCEDURE — 83036 HEMOGLOBIN GLYCOSYLATED A1C: CPT | Mod: ZL

## 2023-11-10 PROCEDURE — 84681 ASSAY OF C-PEPTIDE: CPT | Mod: ZL

## 2023-11-10 PROCEDURE — 84439 ASSAY OF FREE THYROXINE: CPT | Mod: ZL

## 2023-11-13 LAB — C PEPTIDE SERPL-MCNC: <0.1 NG/ML (ref 0.9–6.9)

## 2023-11-23 DIAGNOSIS — E11.65 UNCONTROLLED TYPE 2 DIABETES MELLITUS WITH HYPERGLYCEMIA (H): ICD-10-CM

## 2023-11-24 RX ORDER — BLOOD SUGAR DIAGNOSTIC
STRIP MISCELLANEOUS
Qty: 100 STRIP | Refills: 11 | Status: SHIPPED | OUTPATIENT
Start: 2023-11-24

## 2023-11-24 NOTE — TELEPHONE ENCOUNTER
"[facility] sent Rx request for the following:      Requested Prescriptions   Pending Prescriptions Disp Refills    ACCU-CHEK GUIDE test strip [Pharmacy Med Name: Accu-Chek Guide In Vitro Strip]  0     Sig: USE TO TEST BLOOD SUGAR TWICE DAILY       Diabetic Supplies Protocol Failed - 11/23/2023  6:31 PM        Failed - Recent (6 mo) or future (30 days) visit within the authorizing provider's specialty     Patient had office visit in the last 6 months or has a visit in the next 30 days with authorizing provider.  See \"Patient Info\" tab in inbasket, or \"Choose Columns\" in Meds & Orders section of the refill encounter.            Passed - Medication is active on med list        Passed - Patient is 18 years of age or older             Last Prescription Date:   11/14/22  Last Fill Qty/Refills:         100, R-11    Last Office Visit:              3/20/23   Future Office visit:           12/26/23    Prescription approved per [facility] Refill Protocol.  Jagruti Rascon RN on 11/24/2023 at 3:54 PM        "

## 2023-12-01 ENCOUNTER — TELEPHONE (OUTPATIENT)
Dept: INTERNAL MEDICINE | Facility: OTHER | Age: 53
End: 2023-12-01
Payer: COMMERCIAL

## 2023-12-01 NOTE — TELEPHONE ENCOUNTER
Patient requests a call back regarding solutions for getting sensors. Patient stated his pump and  burnt out and will be replace today however he does not have any sensors and will not have any until Dec. 5th-6th.    Pharmacy:  [facility]    Okay to leave detailed message.          Monique Velazquez on 12/1/2023 at 10:02 AM

## 2023-12-04 NOTE — TELEPHONE ENCOUNTER
"Reviewed steps to follow up with [employer] if sensor does NOT last full 10 days and they will replace at no charge.  Patient has already done this and shares he should receive replacement sensor in \"a few days.\"  Reviewed back up system to [facility] and enter readings and carbohydrate, as usual, for bolus advice.  Patient states he is already doing that.    Encouraged patient to check email as [employer] is beginning to send emails for upgrading sensor from Dexcom G6 to G7 today.    Michelle Crow RN, BSN, [facility]  12/4/2023 10:04 AM   "

## 2023-12-26 ENCOUNTER — OFFICE VISIT (OUTPATIENT)
Dept: PEDIATRICS | Facility: OTHER | Age: 53
End: 2023-12-26
Attending: INTERNAL MEDICINE
Payer: COMMERCIAL

## 2023-12-26 VITALS
BODY MASS INDEX: 22.43 KG/M2 | HEIGHT: 66 IN | WEIGHT: 139.6 LBS | DIASTOLIC BLOOD PRESSURE: 89 MMHG | TEMPERATURE: 97.4 F | HEART RATE: 84 BPM | SYSTOLIC BLOOD PRESSURE: 124 MMHG | OXYGEN SATURATION: 99 %

## 2023-12-26 DIAGNOSIS — E10.9 TYPE 1 DIABETES MELLITUS WITHOUT COMPLICATION (H): Primary | ICD-10-CM

## 2023-12-26 DIAGNOSIS — K59.09 CHRONIC CONSTIPATION: ICD-10-CM

## 2023-12-26 DIAGNOSIS — Z23 NEED FOR VACCINATION: ICD-10-CM

## 2023-12-26 DIAGNOSIS — Z12.11 COLON CANCER SCREENING: ICD-10-CM

## 2023-12-26 DIAGNOSIS — M51.26 HERNIATED LUMBAR INTERVERTEBRAL DISC: ICD-10-CM

## 2023-12-26 DIAGNOSIS — R06.09 DOE (DYSPNEA ON EXERTION): ICD-10-CM

## 2023-12-26 DIAGNOSIS — K08.9 POOR DENTITION: ICD-10-CM

## 2023-12-26 DIAGNOSIS — E03.9 ACQUIRED HYPOTHYROIDISM: ICD-10-CM

## 2023-12-26 PROBLEM — E11.65 UNCONTROLLED TYPE 2 DIABETES MELLITUS WITH HYPERGLYCEMIA (H): Chronic | Status: RESOLVED | Noted: 2019-05-31 | Resolved: 2023-12-26

## 2023-12-26 LAB
CHOLEST SERPL-MCNC: 149 MG/DL
CREAT UR-MCNC: 61 MG/DL
FASTING STATUS PATIENT QL REPORTED: NO
HDLC SERPL-MCNC: 58 MG/DL
LDLC SERPL CALC-MCNC: 58 MG/DL
MICROALBUMIN UR-MCNC: <12 MG/L
MICROALBUMIN/CREAT UR: NORMAL MG/G{CREAT}
NONHDLC SERPL-MCNC: 91 MG/DL
TRIGL SERPL-MCNC: 165 MG/DL

## 2023-12-26 PROCEDURE — G0008 ADMIN INFLUENZA VIRUS VAC: HCPCS

## 2023-12-26 PROCEDURE — 99214 OFFICE O/P EST MOD 30 MIN: CPT | Performed by: INTERNAL MEDICINE

## 2023-12-26 PROCEDURE — 91320 SARSCV2 VAC 30MCG TRS-SUC IM: CPT

## 2023-12-26 PROCEDURE — G0463 HOSPITAL OUTPT CLINIC VISIT: HCPCS | Mod: 25

## 2023-12-26 PROCEDURE — 82570 ASSAY OF URINE CREATININE: CPT | Mod: ZL | Performed by: INTERNAL MEDICINE

## 2023-12-26 PROCEDURE — 80061 LIPID PANEL: CPT | Mod: ZL | Performed by: INTERNAL MEDICINE

## 2023-12-26 PROCEDURE — 36415 COLL VENOUS BLD VENIPUNCTURE: CPT | Mod: ZL | Performed by: INTERNAL MEDICINE

## 2023-12-26 RX ORDER — MELOXICAM 7.5 MG/1
7.5-15 TABLET ORAL DAILY PRN
Qty: 180 TABLET | Refills: 3 | Status: SHIPPED | OUTPATIENT
Start: 2023-12-26 | End: 2024-08-27

## 2023-12-26 RX ORDER — ASPIRIN 81 MG/1
81 TABLET ORAL
Qty: 90 TABLET | Refills: 3 | Status: SHIPPED | OUTPATIENT
Start: 2023-12-26

## 2023-12-26 RX ORDER — LORAZEPAM 0.5 MG/1
TABLET ORAL
COMMUNITY
Start: 2023-12-13

## 2023-12-26 RX ORDER — INSULIN ASPART 100 [IU]/ML
INJECTION, SOLUTION INTRAVENOUS; SUBCUTANEOUS
Qty: 50 ML | Refills: 11 | Status: SHIPPED | OUTPATIENT
Start: 2023-12-26 | End: 2024-08-27

## 2023-12-26 RX ORDER — POLYETHYLENE GLYCOL 3350 17 G/17G
1 POWDER, FOR SOLUTION ORAL DAILY
Qty: 850 G | Refills: 11 | Status: SHIPPED | OUTPATIENT
Start: 2023-12-26

## 2023-12-26 RX ORDER — LEVOTHYROXINE SODIUM 112 UG/1
112 TABLET ORAL DAILY
Qty: 90 TABLET | Refills: 3 | Status: SHIPPED | OUTPATIENT
Start: 2023-12-26

## 2023-12-26 RX ORDER — SIMVASTATIN 20 MG
20 TABLET ORAL AT BEDTIME
Qty: 90 TABLET | Refills: 4 | Status: SHIPPED | OUTPATIENT
Start: 2023-12-26 | End: 2024-08-27

## 2023-12-26 ASSESSMENT — PATIENT HEALTH QUESTIONNAIRE - PHQ9
SUM OF ALL RESPONSES TO PHQ QUESTIONS 1-9: 17
10. IF YOU CHECKED OFF ANY PROBLEMS, HOW DIFFICULT HAVE THESE PROBLEMS MADE IT FOR YOU TO DO YOUR WORK, TAKE CARE OF THINGS AT HOME, OR GET ALONG WITH OTHER PEOPLE: VERY DIFFICULT
SUM OF ALL RESPONSES TO PHQ QUESTIONS 1-9: 17

## 2023-12-26 ASSESSMENT — PAIN SCALES - GENERAL: PAINLEVEL: MODERATE PAIN (5)

## 2023-12-26 NOTE — LETTER
December 26, 2023      Rick Contreras  [address]   [place] [address]        Dear ,    We are writing to inform you of your test results.    These are good.    Signed, Adebayo Dubois MD, FAAP, FACP  Internal Medicine & Pediatrics        Resulted Orders   Albumin Random Urine Quantitative with Creat Ratio   Result Value Ref Range    Creatinine Urine mg/dL 61.0 mg/dL      Comment:      The reference ranges have not been established in urine creatinine. The results should be integrated into the clinical context for interpretation.    Albumin Urine mg/L <12.0 mg/L      Comment:      The reference ranges have not been established in urine albumin. The results should be integrated into the clinical context for interpretation.    Albumin Urine mg/g Cr        Comment:      Unable to calculate, urine albumin and/or urine creatinine is outside detectable limits.  Microalbuminuria is defined as an albumin:creatinine ratio of 17 to 299 for males and 25 to 299 for females. A ratio of albumin:creatinine of 300 or higher is indicative of overt proteinuria.  Due to biologic variability, positive results should be confirmed by a second, first-morning random or 24-hour timed urine specimen. If there is discrepancy, a third specimen is recommended. When 2 out of 3 results are in the microalbuminuria range, this is evidence for incipient nephropathy and warrants increased efforts at glucose control, blood pressure control, and institution of therapy with an angiotensin-converting-enzyme (ACE) inhibitor (if the patient can tolerate it).     Lipid Panel   Result Value Ref Range    Cholesterol 149 <200 mg/dL    Triglycerides 165 (H) <150 mg/dL    Direct Measure HDL 58 >=40 mg/dL    LDL Cholesterol Calculated 58 <=100 mg/dL    Non HDL Cholesterol 91 <130 mg/dL    Patient Fasting > 8hrs? No     Narrative    Cholesterol  Desirable:  <200 mg/dL    Triglycerides  Normal:  Less than 150 mg/dL  Borderline High:  150-199 mg/dL  High:   200-499 mg/dL  Very High:  Greater than or equal to 500 mg/dL    Direct Measure HDL  Female:  Greater than or equal to 50 mg/dL   Male:  Greater than or equal to 40 mg/dL    LDL Cholesterol  Desirable:  <100mg/dL  Above Desirable:  100-129 mg/dL   Borderline High:  130-159 mg/dL   High:  160-189 mg/dL   Very High:  >= 190 mg/dL    Non HDL Cholesterol  Desirable:  130 mg/dL  Above Desirable:  130-159 mg/dL  Borderline High:  160-189 mg/dL  High:  190-219 mg/dL  Very High:  Greater than or equal to 220 mg/dL       If you have any questions or concerns, please call the clinic at the number listed above.       Sincerely,      Adebayo Dubois MD

## 2023-12-26 NOTE — PATIENT INSTRUCTIONS
-- Schedule an office visit with Michelle for pump management and CGM   -- Consult to Marianne dietician   -- Keep food log for 1 week prior to her visit     -- Daily cardiovascular exercise   -- If concerns return to discuss stress testing    Aspects of Diabetes we can improve:  Hemoglobin A1c Lab Results   Component Value Date    A1C 9.2 11/10/2023    A1C 9.3 03/20/2023    A1C 8.8 11/14/2022    A1C 9.7 02/03/2022    A1C 10.1 11/27/2020    A1C 9.4 02/13/2020    A1C 10.3 05/31/2019    A1C 9.3 11/01/2018    A1C 7.1 03/12/2018    Goal range is under 8. Best is 6.5 to 7   Blood Pressure 124/89 Goal to keep less than 140/90   Tobacco  reports that he quit smoking about 8 years ago. His smoking use included cigarettes. He has a 10 pack-year smoking history. He has never used smokeless tobacco. Goal to abstain from tobacco   Aspirin yes Aspirin reduces risk of heart disease and stroke   ACE/ARB Allergy These medications reduce risk of kidney disease   Cholesterol simvastatin Statins reduce risk of heart disease and stroke   Eye Exam annual Annual diabetic eye exam   Healthy weight Body mass index is 22.36 kg/m . Goal BMI under 30, best is under 25.      -- I'm trying to exercise daily (goal at least 20 min/day) with moderate aerobic activity   -- Eat healthy (resources from ADA at http://www.diabetes.org/)   -- I'm taking good care of my feet. Consider seeing the Podiatrist   -- Check blood sugars as directed, record in log book and bring to every appointment   -- Goal sugar before breakfast: under 140   -- Goal sugar 2 hours after supper: under 170   -- Next diabetes lab draw: 3 month   -- Next diabetes office visit: 3 months      Cologuard Patient Instructions    You received an order for a Cologuard test. You will receive your kit in the mail. Please follow the instructions that are provided in the kit.      Reminder: Ship Cologuard test the same day or the next day to allow enough delivery time. The lab must receive  your specimen within 4 days for successful testing. If not delivered in time, you may have to complete the process again.    Home Pick-up:  Once you complete the kit, call [facility] at [phone] and they will schedule a UPS pickup for you.    OR    Drop Off Locations:  Once you have completed the collection and have it ready to be shipped, bring it to one of the following sites listed below. *Note: none of the sites ship out later than mid-morning. Please do not drop off Fridays, as they will not go out until Monday which will make your specimen inacceptable.     - [facility] ([address], [place] [address])      Drop off: Monday-Thursday, 8:00am-4:30pm at the [address] check-in desk      - [facility] ([address], [place] [address])   Drop off: Monday-Thursday, 8:00am-5:45pm     - [facility] ([address], [place] [address])     Drop off: Monday-Thursday, 3:00pm-5:30pm

## 2023-12-26 NOTE — NURSING NOTE
"Chief Complaint   Patient presents with    Diabetes    Thyroid Problem    Back Pain     chronic       Initial /89   Pulse 84   Temp 97.4  F (36.3  C) (Tympanic)   Ht 1.683 m (5' 6.25\")   Wt 63.3 kg (139 lb 9.6 oz)   SpO2 99%   BMI 22.36 kg/m   Estimated body mass index is 22.36 kg/m  as calculated from the following:    Height as of this encounter: 1.683 m (5' 6.25\").    Weight as of this encounter: 63.3 kg (139 lb 9.6 oz).  Medication Review: complete    The next two questions are to help us understand your food security.  If you are feeling you need any assistance in this area, we have resources available to support you today.          12/26/2023   SDOH- Food Insecurity   Within the past 12 months, did you worry that your food would run out before you got money to buy more? N   Within the past 12 months, did the food you bought just not last and you didn t have money to get more? N         Health Care Directive:  Patient does not have a Health Care Directive or Living Will: Patient states has Advance Directive and will bring in a copy to clinic.    Emily Ricketts, [facility]      "

## 2023-12-26 NOTE — PROGRESS NOTES
Assessment & Plan   1. Type 1 diabetes mellitus without complication (H)  Uncontrolled. No glucoses for my review.  Using pump per Rx. Low c-peptide confirms conversion to type 1 diabetes.     - Nutrition Referral; Future  - simvastatin (ZOCOR) 20 MG tablet; Take 1 tablet (20 mg) by mouth at bedtime  Dispense: 90 tablet; Refill: 4  - insulin aspart (NOVOLOG VIAL) 100 UNITS/ML vial; USE PER INSULIN PUMP: BASAL: 0.4 UNIT/HOUR, BOLUS: ICR 1:15 GRAMS, ISF 1:100 MG/DL. MAX DAILY DOSE 200 UNITS  Dispense: 50 mL; Refill: 11  - aspirin 81 MG EC tablet; Take 1 tablet (81 mg) by mouth daily with food  Dispense: 90 tablet; Refill: 3  - Hemoglobin A1c; Future  - Albumin Random Urine Quantitative with Creat Ratio; Future  - Lipid Panel; Future  - Albumin Random Urine Quantitative with Creat Ratio    2. Herniated lumbar intervertebral disc  Has been using mainly at night, working well for him.  - meloxicam (MOBIC) 7.5 MG tablet; Take 1-2 tablets (7.5-15 mg) by mouth daily as needed for pain  Dispense: 180 tablet; Refill: 3    3. Chronic constipation  At goal, no change.  - polyethylene glycol (MIRALAX) 17 GM/Dose powder; Take 17 g (1 Capful) by mouth daily  Dispense: 850 g; Refill: 11    4. Acquired hypothyroidism  Last TSH too high, recommend increase and recheck in 3 months.  - levothyroxine (SYNTHROID/LEVOTHROID) 112 MCG tablet; Take 1 tablet (112 mcg) by mouth daily  Dispense: 90 tablet; Refill: 3  - TSH with free T4 reflex; Future    5. Poor dentition    6. Colon cancer screening  Never turned in previous kit.  - COLOGUARD(EXACT SCIENCES); Future    7. Need for vaccination  - INFLUENZA VACCINE 18-64Y (FLUBLOK)  - COVID-19 12+ (2023-24) (PFIZER)    8. VAN (dyspnea on exertion)  Probably out of shape, but high risk for COPD and coronary disease. Recommend daily cardiovascular fitness.  Warning signs discussed, return if concerns.    Patient Instructions    -- Schedule an office visit with Michelle for pump management and CGM    -- Consult to Marianne mariaician   -- Keep food log for 1 week prior to her visit     -- Daily cardiovascular exercise   -- If concerns return to discuss stress testing    Aspects of Diabetes we can improve:  Hemoglobin A1c Lab Results   Component Value Date    A1C 9.2 11/10/2023    A1C 9.3 03/20/2023    A1C 8.8 11/14/2022    A1C 9.7 02/03/2022    A1C 10.1 11/27/2020    A1C 9.4 02/13/2020    A1C 10.3 05/31/2019    A1C 9.3 11/01/2018    A1C 7.1 03/12/2018    Goal range is under 8. Best is 6.5 to 7   Blood Pressure 124/89 Goal to keep less than 140/90   Tobacco  reports that he quit smoking about 8 years ago. His smoking use included cigarettes. He has a 10 pack-year smoking history. He has never used smokeless tobacco. Goal to abstain from tobacco   Aspirin yes Aspirin reduces risk of heart disease and stroke   ACE/ARB Allergy These medications reduce risk of kidney disease   Cholesterol simvastatin Statins reduce risk of heart disease and stroke   Eye Exam annual Annual diabetic eye exam   Healthy weight Body mass index is 22.36 kg/m . Goal BMI under 30, best is under 25.      -- I'm trying to exercise daily (goal at least 20 min/day) with moderate aerobic activity   -- Eat healthy (resources from ADA at http://www.diabetes.org/)   -- I'm taking good care of my feet. Consider seeing the Podiatrist   -- Check blood sugars as directed, record in log book and bring to every appointment   -- Goal sugar before breakfast: under 140   -- Goal sugar 2 hours after supper: under 170   -- Next diabetes lab draw: 3 month   -- Next diabetes office visit: 3 months      Cologuard Patient Instructions    You received an order for a Cologuard test. You will receive your kit in the mail. Please follow the instructions that are provided in the kit.      Reminder: Ship Cologuard test the same day or the next day to allow enough delivery time. The lab must receive your specimen within 4 days for successful testing. If not delivered in  "time, you may have to complete the process again.    Home Pick-up:  Once you complete the kit, call [facility] at [phone] and they will schedule a UPS pickup for you.    OR    Drop Off Locations:  Once you have completed the collection and have it ready to be shipped, bring it to one of the following sites listed below. *Note: none of the sites ship out later than mid-morning. Please do not drop off Fridays, as they will not go out until Monday which will make your specimen inacceptable.     - [facility] ([address], [place] [address])      Drop off: Monday-Thursday, 8:00am-4:30pm at the [address] check-in desk      - [facility] ([address], [place] [address])   Drop off: Monday-Thursday, 8:00am-5:45pm     - [facility] ([address], [place] [address])     Drop off: Monday-Thursday, 3:00pm-5:30pm         Return in about 3 months (around 3/26/2024), or if symptoms worsen or fail to improve, for diabetes.    Signed, Adebayo Dubois MD, FAAP, FACP  Internal Medicine & Pediatrics    Subjective   Rick Contreras is a 53 year old male who presents for diabetes. Also having dyspnea on exertion. No PND or orthopnea. No edema. No angina.    Objective   Vitals: /89   Pulse 84   Temp 97.4  F (36.3  C) (Tympanic)   Ht 1.683 m (5' 6.25\")   Wt 63.3 kg (139 lb 9.6 oz)   SpO2 99%   BMI 22.36 kg/m          Review and Analysis of Data   I personally reviewed the following:  External notes: No  Results: Yes diabetic lab  Use of an independent historian: No  Independent review of a test performed by another physician: No  Discussion of management with another physician: No  Moderate risk of morbidity from additional diagnostic testing and/or treatment.      Patient currently uses a Dexcom CGM for continuous monitoring of glucose.   Patient injects or boluses insulin 3 or more times daily before breakfast, before lunch, before dinner, and bedtime.  Patient requires frequent " adjustments to their insulin treatment regimen on the basis of therapeutic CGM testing results.    Patient utilizes a continuous subcutaneous insulin infusion (CSII) pump.   Patient uses pump advice for frequent adjustments of  insulin.

## 2023-12-26 NOTE — Clinical Note
Please abstract the following data from this visit with this patient into the appropriate field in Epic:  Tests that can be patient reported without a hard copy:  Eye exam with ophthalmology on this date: 08/2022 [facility]- Patient reported  Other Tests found in the patient's chart through Chart Review/Care Everywhere:  Eye exam with ophthalmology done by this group [facility] on this date: 08/2022- Patient reported  Note to Abstraction: If this section is blank, no results were found via Chart Review/Care Everywhere.

## 2024-01-02 ENCOUNTER — LAB (OUTPATIENT)
Dept: PEDIATRICS | Facility: OTHER | Age: 54
End: 2024-01-02
Payer: COMMERCIAL

## 2024-01-02 DIAGNOSIS — Z12.11 COLON CANCER SCREENING: ICD-10-CM

## 2024-01-07 DIAGNOSIS — E10.9 TYPE 1 DIABETES MELLITUS WITHOUT COMPLICATION (H): ICD-10-CM

## 2024-01-10 RX ORDER — SIMVASTATIN 20 MG
20 TABLET ORAL AT BEDTIME
Qty: 90 TABLET | Refills: 0 | OUTPATIENT
Start: 2024-01-10

## 2024-01-10 NOTE — TELEPHONE ENCOUNTER
[facility] sent Rx request for the following:      Requested Prescriptions   Pending Prescriptions Disp Refills    simvastatin (ZOCOR) 20 MG tablet [Pharmacy Med Name: Simvastatin 20 MG Oral Tablet] 90 tablet 0     Sig: TAKE 1 TABLET BY MOUTH AT BEDTIME       Statins Protocol Passed - 1/7/2024 10:03 PM   Last Prescription Date:   12/26/23  Last Fill Qty/Refills:         90, R-4    Last Office Visit:              12/26/23   Future Office visit:           none   Redundant refill request refused: Too soon:  Shaniqua Reed RN on 1/10/2024 at 9:11 AM

## 2024-03-22 DIAGNOSIS — E11.65 TYPE 2 DIABETES MELLITUS WITH HYPERGLYCEMIA, WITH LONG-TERM CURRENT USE OF INSULIN (H): ICD-10-CM

## 2024-03-22 DIAGNOSIS — Z79.4 TYPE 2 DIABETES MELLITUS WITH HYPERGLYCEMIA, WITH LONG-TERM CURRENT USE OF INSULIN (H): ICD-10-CM

## 2024-03-26 RX ORDER — PROCHLORPERAZINE 25 MG/1
SUPPOSITORY RECTAL
Qty: 1 EACH | Refills: 0 | Status: SHIPPED | OUTPATIENT
Start: 2024-03-26 | End: 2024-05-07

## 2024-03-26 RX ORDER — INFUSION SET FOR INSULIN PUMP
INFUSION SETS-PARAPHERNALIA MISCELLANEOUS
Qty: 30 EACH | Refills: 11 | Status: SHIPPED | OUTPATIENT
Start: 2024-03-26

## 2024-03-26 NOTE — TELEPHONE ENCOUNTER
"[facility] sent Rx request for the following:      Requested Prescriptions   Pending Prescriptions Disp Refills    Continuous Blood Gluc Transmit (DEXCOM G6 TRANSMITTER) MISC [Pharmacy Med Name: DEXCOM G6 TRANSMITTER  MISC] 1 each 3     Sig: CHANGE EVERY THREE MONTHS   Last Prescription Date:   3/28/23  Last Fill Qty/Refills:         1, R-3        Insulin Infusion Pump Supplies (AUTOSOFT 30 INFUSION SET) MISC [Pharmacy Med Name: AUTOSOFT 30 INF SET 13MM 43\"]  1     Sig: CHANGE EVERY 3 DAYS   Last Prescription Date:   10/4/23  Last Fill Qty/Refills:         30, R-1      There is no refill protocol information for this order     Last Office Visit:              12/26/23   Future Office visit:           None    Per LOV note:  Return in about 3 months (around 3/26/2024), or if symptoms worsen or fail to improve, for diabetes.     Pt due for diabetic check up. Routing to provider for refill consideration. Routing to Unit scheduling pool, to assist Pt in scheduling appointment.     Routing to covering provider for refill consideration, as PCP/provider is out of clinic >48 hours or Pt is completely out of medication and provider is out of the clinic today.    Unable to complete prescription refill per RN Medication Refill Policy.     Rebekah Ramirez RN .............. 3/26/2024  11:55 AM  "

## 2024-04-30 DIAGNOSIS — E11.65 TYPE 2 DIABETES MELLITUS WITH HYPERGLYCEMIA, WITH LONG-TERM CURRENT USE OF INSULIN (H): ICD-10-CM

## 2024-04-30 DIAGNOSIS — Z79.4 TYPE 2 DIABETES MELLITUS WITH HYPERGLYCEMIA, WITH LONG-TERM CURRENT USE OF INSULIN (H): ICD-10-CM

## 2024-05-02 ENCOUNTER — TELEPHONE (OUTPATIENT)
Dept: PEDIATRICS | Facility: OTHER | Age: 54
End: 2024-05-02
Payer: COMMERCIAL

## 2024-05-02 NOTE — TELEPHONE ENCOUNTER
Disregard. Closing encounter as refill request has already been opened. Please see previous encounter with BAS regarding dexcom refill.     Amparo Lam LPN on 5/2/2024 at 1:43 PM   Ext. [phone]

## 2024-05-06 NOTE — TELEPHONE ENCOUNTER
[facility] is still waiting for the prescription to be returned for the Dexcom 6 transmitter. Please call [facility] at [phone].        Anne Swain on 5/6/2024 at 12:32 PM

## 2024-05-07 RX ORDER — PROCHLORPERAZINE 25 MG/1
SUPPOSITORY RECTAL
Qty: 1 EACH | Refills: 11 | Status: SHIPPED | OUTPATIENT
Start: 2024-05-07

## 2024-05-07 NOTE — TELEPHONE ENCOUNTER
[facility] sent Rx request for the following:      Requested Prescriptions   Pending Prescriptions Disp Refills    Continuous Glucose Transmitter (DEXCOM G6 TRANSMITTER) MISC [Pharmacy Med Name: DEXCOM G6 TRANSMITTER  MISC] 1 each 0     Sig: CHANGE EVERY THREE MONTHS     Last Prescription Date:   3/26/24  Last Fill Qty/Refills:         1, R-0    Last Office Visit:              12/26/23   Future Office visit:             Next 5 appointments (look out 90 days)      May 24, 2024 10:00 AM  (Arrive by 9:45 AM)  SHORT with Adebayo Dubois MD  [facility] ) [address]  [place] [address] [phone]       Per LOV note:  Return in about 3 months (around 3/26/2024), or if symptoms worsen or fail to improve, for diabetes.     If using as directed, transmitter should last until 6/26/24. Pt has appointment 5/24. Routing to provider to address, as pharmacy is calling again on this.    Unable to complete prescription refill per RN Medication Refill Policy.     Rebekah Ramirez RN .............. 5/7/2024  9:35 AM

## 2024-05-24 ENCOUNTER — LAB (OUTPATIENT)
Dept: LAB | Facility: OTHER | Age: 54
End: 2024-05-24
Attending: INTERNAL MEDICINE
Payer: COMMERCIAL

## 2024-05-24 ENCOUNTER — OFFICE VISIT (OUTPATIENT)
Dept: PEDIATRICS | Facility: OTHER | Age: 54
End: 2024-05-24
Attending: INTERNAL MEDICINE
Payer: COMMERCIAL

## 2024-05-24 VITALS
HEART RATE: 64 BPM | DIASTOLIC BLOOD PRESSURE: 88 MMHG | RESPIRATION RATE: 16 BRPM | OXYGEN SATURATION: 99 % | HEIGHT: 66 IN | BODY MASS INDEX: 22.32 KG/M2 | TEMPERATURE: 96.7 F | SYSTOLIC BLOOD PRESSURE: 138 MMHG | WEIGHT: 138.9 LBS

## 2024-05-24 DIAGNOSIS — E03.9 ACQUIRED HYPOTHYROIDISM: ICD-10-CM

## 2024-05-24 DIAGNOSIS — E10.9 TYPE 1 DIABETES MELLITUS WITHOUT COMPLICATION (H): Primary | Chronic | ICD-10-CM

## 2024-05-24 DIAGNOSIS — E10.9 TYPE 1 DIABETES MELLITUS WITHOUT COMPLICATION (H): ICD-10-CM

## 2024-05-24 DIAGNOSIS — K64.4 EXTERNAL HEMORRHOIDS: ICD-10-CM

## 2024-05-24 DIAGNOSIS — Z96.41 INSULIN PUMP IN PLACE: ICD-10-CM

## 2024-05-24 DIAGNOSIS — E03.9 ACQUIRED HYPOTHYROIDISM: Chronic | ICD-10-CM
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
## 2024-05-24 DIAGNOSIS — Z97.8 USES SELF-APPLIED CONTINUOUS GLUCOSE MONITORING DEVICE: ICD-10-CM

## 2024-05-24 DIAGNOSIS — Z78.9 ALCOHOL USE: ICD-10-CM

## 2024-05-24 DIAGNOSIS — L60.3 DYSTROPHIC NAIL: ICD-10-CM

## 2024-05-24 LAB
HBA1C MFR BLD: 9 % (ref 4–6.2)
T4 FREE SERPL-MCNC: 1.22 NG/DL (ref 0.9–1.7)
TSH SERPL DL<=0.005 MIU/L-ACNC: 4.5 UIU/ML (ref 0.3–4.2)

## 2024-05-24 PROCEDURE — 84443 ASSAY THYROID STIM HORMONE: CPT | Mod: ZL

## 2024-05-24 PROCEDURE — 95251 CONT GLUC MNTR ANALYSIS I&R: CPT | Performed by: INTERNAL MEDICINE

## 2024-05-24 PROCEDURE — 84439 ASSAY OF FREE THYROXINE: CPT | Mod: ZL

## 2024-05-24 PROCEDURE — G0463 HOSPITAL OUTPT CLINIC VISIT: HCPCS | Performed by: INTERNAL MEDICINE

## 2024-05-24 PROCEDURE — 99207 PR FOOT EXAM NO CHARGE: CPT | Performed by: INTERNAL MEDICINE

## 2024-05-24 PROCEDURE — 36415 COLL VENOUS BLD VENIPUNCTURE: CPT | Mod: ZL

## 2024-05-24 PROCEDURE — 83036 HEMOGLOBIN GLYCOSYLATED A1C: CPT | Mod: ZL

## 2024-05-24 PROCEDURE — 99214 OFFICE O/P EST MOD 30 MIN: CPT | Mod: 25 | Performed by: INTERNAL MEDICINE

## 2024-05-24 RX ORDER — CHLORHEXIDINE GLUCONATE 40 MG/ML
SOLUTION TOPICAL WEEKLY
Qty: 473 ML | Refills: 5 | Status: SHIPPED | OUTPATIENT
Start: 2024-05-24

## 2024-05-24 ASSESSMENT — PAIN SCALES - GENERAL: PAINLEVEL: SEVERE PAIN (7)

## 2024-05-24 NOTE — PATIENT INSTRUCTIONS
-- Use chlorhexidine weekly on your body to reduce infection at CGM and pump sites     -- See Michelle when able   -- Lab only 3 months   -- Dr. Dubois 3 months

## 2024-05-24 NOTE — NURSING NOTE
Chief Complaint   Patient presents with    RECHECK     Diabetic Check        Medication Reconciliation: complete    Patient presents to the clinic for diabetic check.     Jessica Brannon LPN

## 2024-05-24 NOTE — PROGRESS NOTES
Assessment & Plan   1. Type 1 diabetes mellitus without complication (H)  Continuous Glucose Monitor (CGM) Professional interpretation procedural note  I personally reviewed more than 72 hours of CGM data for Mr. Rick Contreras 5/24/2024.   Throughout the day his blood sugars are fairly well-controlled although they are still in the low 200s.  Between 2 and 5 AM they tend to spike into the upper 300s.  I reviewed his insulin pump.  He was previously getting 0.2 units/h.  This resulted in approximately 7 units given over 24 hours over the last week for his basal rate with significant bolusing in the 7 to 20 units generally speaking.  I recommend increasing his basal rate.  I personally increased his basal rate from 0.2 units/h to 0.3 units/h.  I would like him to have close follow-up with Michelle of diabetes education.  Possibly she can work with him to add a separate basal rate for his nighttime numbers.  Rick appears to be expressing a desire to take better care of his health.    Recommendations:  Based on the patterns and trends, the following recommendations are made today:   -- Medication changes, if applicable outlined below.   -- Close follow-up is recommended for in person follow-up and review, with ongoing therapy adjustments.        - FOOT EXAM  - Miscellaneous Order for DME - (Use only if a more specific DME order does not already exist  - Hemoglobin A1c; Future  - GLUCOSE MONITOR, 72 HOUR, PHYS INTERP    2. CGM and insulin pump site infections  He has been getting some irritation at the site of his infusion set and CGM.  I recommend using chlorhexidine.  We discussed the possibility for allergic reaction to the glue but feel like this is less likely.  - chlorhexidine (HIBICLENS) 4 % solution; Apply topically once a week  Dispense: 473 mL; Refill: 5  - GLUCOSE MONITOR, 72 HOUR, PHYS INTERP    3. Uses self-applied continuous glucose monitoring device  - chlorhexidine (HIBICLENS) 4 % solution; Apply  "topically once a week  Dispense: 473 mL; Refill: 5  - GLUCOSE MONITOR, 72 HOUR, PHYS INTERP    4. Insulin pump in place  - chlorhexidine (HIBICLENS) 4 % solution; Apply topically once a week  Dispense: 473 mL; Refill: 5    5. External hemorrhoids  He is requesting a referral for hemorrhoid removal.  General surgery consultation request  - Adult General Surg Referral; Future    6. Acquired hypothyroidism  TSH nearly back to normal range.  No change to levothyroxine dose.  - TSH with free T4 reflex; Future    7. Dystrophic nail  Referred for diabetic shoes and diabetic inserts    - Miscellaneous Order for DME - (Use only if a more specific DME order does not already exist    8. Alcohol use  Applauded his reduction of alcohol use down from 5 drinks a night to 5 drinks a week.  Recommend continuing to work on reducing alcohol use.        Patient Instructions    -- Use chlorhexidine weekly on your body to reduce infection at CGM and pump sites     -- See Michelle when able   -- Lab only 3 months   -- Dr. Dubois 3 months      Return in about 3 months (around 8/24/2024), or if symptoms worsen or fail to improve, for annual visit, diabetes, med management.    Signed, Adebayo Dubois MD, FAAP, FACP  Internal Medicine & Pediatrics    Subjective   Rick Contreras is a 54 year old male who presents for RECHECK (Diabetic Check ).    Objective   Vitals: /88   Pulse 64   Temp (!) 96.7  F (35.9  C) (Tympanic)   Resp 16   Ht 1.68 m (5' 6.14\")   Wt 63 kg (138 lb 14.4 oz)   SpO2 99%   BMI 22.32 kg/m      Foot Exam:  5/24/2024  Intact to monofilament bilaterally.  Skin intact without erythema.  Right great toenail thickened, dystrophic    Foot/Ankle Musculoskeletal Exam      Review and Analysis of Data   I personally reviewed the following:  External notes: No  Results: Yes diabetic lab  Use of an independent historian: No  Independent review of a test performed by another physician: No  Discussion of management with another " physician: No  Moderate risk of morbidity from additional diagnostic testing and/or treatment.    Patient currently uses a Dexcom CGM for continuous monitoring of glucose.   Patient injects or boluses insulin 1 or more times daily.  Patient requires frequent adjustments to their insulin treatment regimen on the basis of therapeutic CGM testing results.

## 2024-05-28 ENCOUNTER — MEDICAL CORRESPONDENCE (OUTPATIENT)
Dept: HEALTH INFORMATION MANAGEMENT | Facility: OTHER | Age: 54
End: 2024-05-28
Payer: COMMERCIAL

## 2024-06-10 ENCOUNTER — OFFICE VISIT (OUTPATIENT)
Dept: SURGERY | Facility: OTHER | Age: 54
End: 2024-06-10
Attending: SURGERY
Payer: COMMERCIAL

## 2024-06-10 VITALS
RESPIRATION RATE: 18 BRPM | HEART RATE: 74 BPM | WEIGHT: 140 LBS | SYSTOLIC BLOOD PRESSURE: 138 MMHG | DIASTOLIC BLOOD PRESSURE: 82 MMHG | TEMPERATURE: 98.1 F | BODY MASS INDEX: 21.22 KG/M2 | HEIGHT: 68 IN | OXYGEN SATURATION: 98 %

## 2024-06-10 DIAGNOSIS — K64.8 INTERNAL AND EXTERNAL BLEEDING HEMORRHOIDS: ICD-10-CM

## 2024-06-10 DIAGNOSIS — K64.4 INTERNAL AND EXTERNAL BLEEDING HEMORRHOIDS: ICD-10-CM

## 2024-06-10 PROCEDURE — 46221 LIGATION OF HEMORRHOID(S): CPT | Performed by: SURGERY

## 2024-06-10 PROCEDURE — G0463 HOSPITAL OUTPT CLINIC VISIT: HCPCS | Mod: 25

## 2024-06-10 ASSESSMENT — PAIN SCALES - GENERAL: PAINLEVEL: NO PAIN (0)

## 2024-06-10 NOTE — PROGRESS NOTES
Procedure Note     Pre/Post Operative Diagnosis:   Bleeding internal hemorrhoids    Procedure:    Banding of internal hemorrhoids    Surgeon: CATINA Rodriguez MD     Local Anesthesia: None    Indication for the procedure:    This is a 54 year old male patient with rectal bleeding and grade 2-3 hemorrhoids.   After explaining the risks to include bleeding, infection, urinary retention or need for re-banding, and scarring the patient wished to proceed.     Procedure:   Patient was placed in the left lateral decubitus position.  The anal verge and anal margin were examined and there is a very small burden of external hemorrhoidal tissue in the right anal verge.  Digital rectal exam reveals soft internal hemorrhoidal tissue, greatest on the left.  Anoscopy reveals prominent internal hemorrhoidal tissue in the right anterior and posterior columns.  There is a small bit of internal hemorrhoid tissue on the right.  Area on the left side appears to be friable and likely the source of bleeding.  Patient is interested in hemorrhoid banding thus, I placed bands over the 3 prominent areas of internal hemorrhoidal tissue and the patient tolerated this well with no discomfort.    Plan:  Stool softener   Follow-up if bleeding continues      CATINA Rodriguez MD

## 2024-06-10 NOTE — NURSING NOTE
"Chief Complaint   Patient presents with    Procedure     External hemorrhoids         Medication reconciliation completed.    FOOD SECURITY SCREENING QUESTIONS:    The next two questions are to help us understand your food security.  If you are feeling you need any assistance in this area, we have resources available to support you today.    Hunger Vital Signs:  Within the past 12 months we worried whether our food would run out before we got money to buy more. Never  Within the past 12 months the food we bought just didn't last and we didn't have money to get more. Never    Initial /82 (BP Location: Left arm, Patient Position: Sitting, Cuff Size: Adult Regular)   Pulse 74   Temp 98.1  F (36.7  C) (Temporal)   Resp 18   Ht 1.727 m (5' 8\")   Wt 63.5 kg (140 lb)   SpO2 98%   BMI 21.29 kg/m   Estimated body mass index is 21.29 kg/m  as calculated from the following:    Height as of this encounter: 1.727 m (5' 8\").    Weight as of this encounter: 63.5 kg (140 lb).       Doris Elder LPN .......  6/10/2024  2:43 PM    "

## 2024-06-20 ENCOUNTER — MEDICAL CORRESPONDENCE (OUTPATIENT)
Dept: HEALTH INFORMATION MANAGEMENT | Facility: OTHER | Age: 54
End: 2024-06-20
Payer: COMMERCIAL

## 2024-07-23 ENCOUNTER — TRANSFERRED RECORDS (OUTPATIENT)
Dept: MULTI SPECIALTY CLINIC | Facility: CLINIC | Age: 54
End: 2024-07-23

## 2024-07-23 LAB — RETINOPATHY: NORMAL

## 2024-08-27 ENCOUNTER — ALLIED HEALTH/NURSE VISIT (OUTPATIENT)
Dept: EDUCATION SERVICES | Facility: OTHER | Age: 54
End: 2024-08-27
Attending: INTERNAL MEDICINE
Payer: COMMERCIAL

## 2024-08-27 ENCOUNTER — OFFICE VISIT (OUTPATIENT)
Dept: PEDIATRICS | Facility: OTHER | Age: 54
End: 2024-08-27
Attending: INTERNAL MEDICINE
Payer: COMMERCIAL

## 2024-08-27 VITALS
WEIGHT: 138 LBS | SYSTOLIC BLOOD PRESSURE: 137 MMHG | HEIGHT: 68 IN | TEMPERATURE: 97.5 F | HEART RATE: 73 BPM | BODY MASS INDEX: 20.92 KG/M2 | RESPIRATION RATE: 18 BRPM | DIASTOLIC BLOOD PRESSURE: 89 MMHG | OXYGEN SATURATION: 98 %

## 2024-08-27 DIAGNOSIS — Z00.00 ROUTINE GENERAL MEDICAL EXAMINATION AT A HEALTH CARE FACILITY: Primary | ICD-10-CM

## 2024-08-27 DIAGNOSIS — M51.26 HERNIATED LUMBAR INTERVERTEBRAL DISC: ICD-10-CM

## 2024-08-27 DIAGNOSIS — Z12.11 COLON CANCER SCREENING: ICD-10-CM

## 2024-08-27 DIAGNOSIS — Z12.5 SCREENING FOR PROSTATE CANCER: ICD-10-CM

## 2024-08-27 DIAGNOSIS — Z96.41 INSULIN PUMP IN PLACE: ICD-10-CM

## 2024-08-27 DIAGNOSIS — M51.369 DDD (DEGENERATIVE DISC DISEASE), LUMBAR: ICD-10-CM

## 2024-08-27 DIAGNOSIS — E03.9 ACQUIRED HYPOTHYROIDISM: Chronic | ICD-10-CM

## 2024-08-27 DIAGNOSIS — E10.9 TYPE 1 DIABETES MELLITUS WITHOUT COMPLICATION (H): Primary | Chronic | ICD-10-CM

## 2024-08-27 DIAGNOSIS — E10.9 TYPE 1 DIABETES MELLITUS WITHOUT COMPLICATION (H): ICD-10-CM

## 2024-08-27 LAB
ANION GAP SERPL CALCULATED.3IONS-SCNC: 8 MMOL/L (ref 7–15)
BUN SERPL-MCNC: 17.3 MG/DL (ref 6–20)
CALCIUM SERPL-MCNC: 9.2 MG/DL (ref 8.8–10.4)
CHLORIDE SERPL-SCNC: 100 MMOL/L (ref 98–107)
CREAT SERPL-MCNC: 1.1 MG/DL (ref 0.67–1.17)
EGFRCR SERPLBLD CKD-EPI 2021: 80 ML/MIN/1.73M2
GLUCOSE SERPL-MCNC: 362 MG/DL (ref 70–99)
HBA1C MFR BLD: 9.5 % (ref 4–6.2)
HCO3 SERPL-SCNC: 26 MMOL/L (ref 22–29)
POTASSIUM SERPL-SCNC: 5 MMOL/L (ref 3.4–5.3)
PSA SERPL DL<=0.01 NG/ML-MCNC: 3.9 NG/ML (ref 0–3.5)
SODIUM SERPL-SCNC: 134 MMOL/L (ref 135–145)
TSH SERPL DL<=0.005 MIU/L-ACNC: 2.57 UIU/ML (ref 0.3–4.2)

## 2024-08-27 PROCEDURE — 83036 HEMOGLOBIN GLYCOSYLATED A1C: CPT | Mod: ZL | Performed by: INTERNAL MEDICINE

## 2024-08-27 PROCEDURE — 80048 BASIC METABOLIC PNL TOTAL CA: CPT | Mod: ZL | Performed by: INTERNAL MEDICINE

## 2024-08-27 PROCEDURE — 95251 CONT GLUC MNTR ANALYSIS I&R: CPT | Performed by: INTERNAL MEDICINE

## 2024-08-27 PROCEDURE — G0108 DIAB MANAGE TRN  PER INDIV: HCPCS | Performed by: REGISTERED NURSE

## 2024-08-27 PROCEDURE — G0463 HOSPITAL OUTPT CLINIC VISIT: HCPCS

## 2024-08-27 PROCEDURE — 99396 PREV VISIT EST AGE 40-64: CPT | Performed by: INTERNAL MEDICINE

## 2024-08-27 PROCEDURE — 90471 IMMUNIZATION ADMIN: CPT

## 2024-08-27 PROCEDURE — 36415 COLL VENOUS BLD VENIPUNCTURE: CPT | Mod: ZL | Performed by: INTERNAL MEDICINE

## 2024-08-27 PROCEDURE — 90636 HEP A/HEP B VACC ADULT IM: CPT

## 2024-08-27 PROCEDURE — 99214 OFFICE O/P EST MOD 30 MIN: CPT | Mod: 25 | Performed by: INTERNAL MEDICINE

## 2024-08-27 PROCEDURE — G0103 PSA SCREENING: HCPCS | Mod: ZL | Performed by: INTERNAL MEDICINE

## 2024-08-27 PROCEDURE — G0463 HOSPITAL OUTPT CLINIC VISIT: HCPCS | Mod: 25

## 2024-08-27 PROCEDURE — 84443 ASSAY THYROID STIM HORMONE: CPT | Mod: ZL | Performed by: INTERNAL MEDICINE

## 2024-08-27 RX ORDER — INSULIN ASPART 100 [IU]/ML
INJECTION, SOLUTION INTRAVENOUS; SUBCUTANEOUS
Qty: 50 ML | Refills: 11 | Status: SHIPPED | OUTPATIENT
Start: 2024-08-27

## 2024-08-27 RX ORDER — SIMVASTATIN 20 MG
20 TABLET ORAL AT BEDTIME
Qty: 90 TABLET | Refills: 4 | Status: SHIPPED | OUTPATIENT
Start: 2024-08-27

## 2024-08-27 RX ORDER — MELOXICAM 7.5 MG/1
7.5-15 TABLET ORAL
Qty: 180 TABLET | Refills: 3 | Status: SHIPPED | OUTPATIENT
Start: 2024-08-27

## 2024-08-27 SDOH — HEALTH STABILITY: PHYSICAL HEALTH: ON AVERAGE, HOW MANY MINUTES DO YOU ENGAGE IN EXERCISE AT THIS LEVEL?: 30 MIN

## 2024-08-27 SDOH — HEALTH STABILITY: PHYSICAL HEALTH: ON AVERAGE, HOW MANY DAYS PER WEEK DO YOU ENGAGE IN MODERATE TO STRENUOUS EXERCISE (LIKE A BRISK WALK)?: 3 DAYS

## 2024-08-27 ASSESSMENT — PAIN SCALES - GENERAL: PAINLEVEL: SEVERE PAIN (7)

## 2024-08-27 ASSESSMENT — SOCIAL DETERMINANTS OF HEALTH (SDOH): HOW OFTEN DO YOU GET TOGETHER WITH FRIENDS OR RELATIVES?: ONCE A WEEK

## 2024-08-27 NOTE — PROGRESS NOTES
Preventive Care Visit  [facility]  Adebayo Dubois MD, Internal Medicine  Aug 27, 2024      1. Routine general medical examination at a health care facility    2. Type 1 diabetes mellitus without complication (H)  Continuous Glucose Monitor (CGM) Professional interpretation procedural note  7. Insulin pump in place  I personally reviewed more than 72 hours of CGM data for Mr. Rick Contreras 8/27/2024.     Recommendations:  Based on the patterns and trends, the following recommendations are made today:   -- Medication changes, if applicable outlined below.   -- Close follow-up is recommended for in person follow-up and review, with ongoing therapy adjustments.      His sugars are all high mostly worse in the afternoon and evening.  Suspect dietary indiscretions and alcohol use.  No pump adjustments made today as he will be seeing Michelle of diabetes education today.    - insulin aspart (NOVOLOG VIAL) 100 UNITS/ML vial; USE PER INSULIN PUMP: BASAL: 0.4 UNIT/HOUR, BOLUS: ICR 1:15 GRAMS, ISF 1:100 MG/DL. MAX DAILY DOSE 200 UNITS  Dispense: 50 mL; Refill: 11  - simvastatin (ZOCOR) 20 MG tablet; Take 1 tablet (20 mg) by mouth at bedtime.  Dispense: 90 tablet; Refill: 4  - Hemoglobin A1c; Future  - Basic metabolic panel; Future  - Hemoglobin A1c  - Basic metabolic panel    3. Herniated lumbar intervertebral disc  It would be really best if we could reduce or stop the meloxicam at some point.  He is using it for significant back pain in the evening not alleviated by Tylenol.  I think it would be best to try another trial of physical and Occupational Therapy.  - meloxicam (MOBIC) 7.5 MG tablet; Take 1-2 tablets (7.5-15 mg) by mouth nightly as needed for pain.  Dispense: 180 tablet; Refill: 3  - Physical Therapy  Referral; Future  - Occupational Therapy  Referral; Future    4. DDD (degenerative disc disease), lumbar  - Physical Therapy  Referral; Future  - Occupational  Therapy  Referral; Future    5. Colon cancer screening  He did not do the prior Cologuard because he had hemorrhoidal bleeding which has been treated.  - COLOGUARD([employer]); Future    6. Screening for prostate cancer  - Prostate Specific Antigen Screen; Future  - Prostate Specific Antigen Screen    8. Hypothyroidism   -- Continue levothyroxine   -- Recheck TSH      Patient Instructions    -- Meet with Michelle today as planned.  You'll need more insulin late afternoon through evening.    -- Work on reducing carbs   -- Eat more veggies   -- See your dentist to get dentures fitted better   -- Then wear dentures daily     -- If we can reduce meloxicam it would be safer for kidney   -- Return to PT/OT   -- Try yoga or geovanny chi        Adebayo Pires MD, FAAP, FACP  Internal Medicine & Pediatrics      Subjective   Rick is a 54 year old, presenting for the following:  Diabetes and Medicare Visit        8/27/2024    10:31 AM   Additional Questions   Roomed by MARIYA Solorzano   Accompanied by Self        Via the Health Maintenance questionnaire, the patient has reported the following services have been completed -Eye Exam: kip 2024-07-23, this information has been sent to the abstraction team.  Health Care Directive  Patient does not have a Health Care Directive or Living Will:     HPI              8/27/2024   General Health   How would you rate your overall physical health? (!) FAIR   Feel stress (tense, anxious, or unable to sleep) To some extent      (!) STRESS CONCERN      8/27/2024   Nutrition   Three or more servings of calcium each day? Yes   Diet: Regular (no restrictions)   How many servings of fruit and vegetables per day? (!) 0-1   How many sweetened beverages each day? 0-1            8/27/2024   Exercise   Days per week of moderate/strenous exercise 3 days   Average minutes spent exercising at this level 30 min            8/27/2024   Social Factors   Frequency of gathering with friends  or relatives Once a week   Worry food won't last until get money to buy more No   Food not last or not have enough money for food? No   Do you have housing? (Housing is defined as stable permanent housing and does not include staying ouside in a car, in a tent, in an abandoned building, in an overnight shelter, or couch-surfing.) Yes   Are you worried about losing your housing? No   Lack of transportation? No   Unable to get utilities (heat,electricity)? No            2024   Fall Risk   Fallen 2 or more times in the past year? No   Trouble with walking or balance? No             2024   Dental   Dentist two times every year? (!) NO            2024   TB Screening   Were you born outside of the [place]? No                  2024   Substance Use   Alcohol more than 3/day or more than 7/wk No   Do you use any other substances recreationally? No        Social History     Tobacco Use    Smoking status: Former     Current packs/day: 0.00     Average packs/day: 0.5 packs/day for 25.0 years (12.5 ttl pk-yrs)     Types: Cigarettes     Start date: 1990     Quit date: 2015     Years since quittin.0    Smokeless tobacco: Never    Tobacco comments:     Quit smoking: has chantix   Vaping Use    Vaping status: Never Used   Substance Use Topics    Alcohol use: Yes     Alcohol/week: 10.0 - 15.0 standard drinks of alcohol     Types: 10 - 15 Standard drinks or equivalent per week     Comment: 2 drinks a day    Drug use: No           2024   STI Screening   New sexual partner(s) since last STI/HIV test? No      ASCVD Risk   The 10-year ASCVD risk score (Dee GIRALDO, et al., 2019) is: 6.7%    Values used to calculate the score:      Age: 54 years      Sex: Male      Is Non- : No      Diabetic: Yes      Tobacco smoker: No      Systolic Blood Pressure: 137 mmHg      Is BP treated: No      HDL Cholesterol: 58 mg/dL      Total Cholesterol: 149 mg/dL           Reviewed and updated  "as needed this visit by Provider                             Objective    Exam  /89 (BP Location: Right arm, Patient Position: Sitting, Cuff Size: Adult Regular)   Pulse 73   Temp 97.5  F (36.4  C) (Tympanic)   Resp 18   Ht 1.715 m (5' 7.5\")   Wt 62.6 kg (138 lb)   SpO2 98%   BMI 21.29 kg/m     Estimated body mass index is 21.29 kg/m  as calculated from the following:    Height as of this encounter: 1.715 m (5' 7.5\").    Weight as of this encounter: 62.6 kg (138 lb).    Physical Exam  Gen: Alert, NAD.  Neck: No carotid bruits  CV: RRR no m/r/g  Pulm: CTAB, no w/r/r. No increased work of breathing  Msk: No LE edema  Neuro: Grossly intact  Skin: No concerning lesions.  Psychiatric: Normal affect and insight. Does not appear anxious or depressed.          Signed Electronically by: Adebayo Dubois MD          -----------------------------  Dexcom Clarity  -----------------------------  Rick Contreras  YOB: 1970  Generated at: Tue, Aug 27, 2024 11:07 AM CDT  Reporting period: Wed Aug 14, 2024 - Tue Aug 27, 2024  -----------------------------  Glucose Details  Average glucose: 223 mg/dL  Standard deviation: 73 mg/dL  GMI: 8.7%  -----------------------------  Time in Range  Very High: 31%  High: 38%  In Range: 31%  Low: 0%  Very Low: 0%    Target Range   mg/dL    -----------------------------  CGM Details  Sensor usage: 93%  Days with CGM data: 13/14 "

## 2024-08-27 NOTE — PROGRESS NOTES
"Diabetes Self-Management Education & Support    Presents for: Insulin Pump and CGM Review    Type of Service: In Person Visit    ASSESSMENT    REPORTS:    Discussed how \"populating glucose reading\" into BOLUS CALCULATOR prior to taking his Quick Bolus for food is necessary to give correction for high blood sugar.  It will NOT recommend a correction if a Quick Bolus has already been delivered as the pump does NOT know you will be consuming a meal, \"you just took 4 units.\"  Pump assumes the 4 units were for a correction and will recommend a 0 unit correction to prevent hypoglycemia.    Discussed how elevated glucose can increase hunger and encouraged patient to take mealtime units BEFORE he consumes his meal.    Insulin Pump download:    Changing infusion set every 3 days.     Average daily carbohydrates: UNKNOWN grams  (Patient uses Quick Bolus and gives 4 - 5 units for 15 - 50 grams of carbohydrate.  Reviewed importance to give LESS Quick Bolus units if he will be consuming one slice of toast for breakfast instead of his Cap'n Crunch cereal with milk.    Patient states, \"Sometimes I go low so will grab some cookies to help prevent the low.\"      Discussed future use of Closed Loop pump, Beta Bionics iLet pump to help patient gain tighter management of diabetes without guessing on carbohydrates, insulin doses and correction units.      Average total daily insulin: 26.12 units  Basal: 46% (12.11 units delivered over 24 hours)  Please note, programmed basal is 7.2 units, so pump is dosing 5 units more daily to help improve glucose readings.    Bolus: 54% (contains patient Quick Boluses plus Automatic Correction Boluses using ISF 1:100 mg/dL)    Future plan is to lower ISF from 1:100 to 1:75 mg/dL, to help pump bring glucose closer to target with automatic correction boluses.        Education specific to insulin pump provided today on:   importance of changing infusion set every 3 days, automated insulin delivery " functions/features, integrated continuous glucose monitor function/features, importance of bolusing before meals, benefits of post-meal glucose checks, treating hypoglycemia with use of automated insulin delivery systems, and Dexcom G6 and G7 differences in systems.    Opportunities for ongoing education and support in diabetes-self management were discussed.    Pt verbalized understanding of concepts discussed and recommendations provided today.       Continue education with the following diabetes management concepts: Healthy Eating, Being Active, Problem Solving, Reducing Risks, Healthy Coping, and Insulin Pump Concepts Calculating boluses  Problem solving with insulin pump therapy (BG monitoring; hypoglycemia signs/symptoms, treatment (glucagon) and prevention; hyperglycemia signs/symptoms, treatment and prevention; ketones, DKA signs/symptoms and prevention)    Pump Education Materials: Beta Bionics iLet insulin pump information          INTERVENTION    PLAN    Recommended pump adjustments:    A.  BASAL (background insulin):  Pump has been automatically dosing 12.11 units per 24 hours.  Adjusted basal from 0.3 unit/hour to 0.5 unit/hour to more closely match what the pump is automatically doing.  New 24 hour basal was increased from 7.2 to 12.0 units per 24 hours.    2.  Recommend correcting for high blood sugar using your BOLUS option in the pump BEFORE taking your insulin for your meal as the pump will not give insulin to correct a high glucose if you take your mealtime insulin first.      3.  Continue to try to take mealtime insulin BEFORE your meal to help prevent high glucose after the meals.    4.  Discussed use of Dexcom G7 CGM and working with [employer] to get your pump software updated.  Please remember your insurance ([employer]) does NOT currently cover the Dexcom G7 CGM system.      5.  Please follow up for continued pump management in 2 - 4 weeks, or earlier as needed.          Follow-up: TBD per patient  "schedule.     See Care Plan for co-developed, patient-state behavior change goals.  AVS provided for patient today.      SUBJECTIVE/OBJECTIVE:  Presents for: Insulin Pump and CGM Review  Accompanied by: Self  Diabetes education in the past 24mo: Yes  Focus of Visit: Insulin Pump, CGM  Type of Pump visit: Pump Review  Type of CGM visit: Personal CGM Follow-up  Diabetes type: Type 1  Disease course: Stable  How confident are you filling out medical forms by yourself:: Quite a bit  Cultural Influences/Ethnic Background:  Not  or       Diabetes Symptoms & Complications:  Diabetes Related Symptoms: Fatigue, Polydipsia (increased thirst), Polyphagia (increased hunger), Polyuria (increased urination), Slow healing wounds  Weight trend: Stable  Symptom course: Stable  Disease course: Stable       Patient Problem List and Family Medical History reviewed for relevant medical history, current medical status, and diabetes risk factors.    Vitals:  There were no vitals taken for this visit.  Estimated body mass index is 21.29 kg/m  as calculated from the following:    Height as of an earlier encounter on 8/27/24: 1.715 m (5' 7.5\").    Weight as of an earlier encounter on 8/27/24: 62.6 kg (138 lb).   Last 3 BP:   BP Readings from Last 3 Encounters:   08/27/24 137/89   06/10/24 138/82   05/24/24 138/88       History   Smoking Status    Former    Packs/day: 0.50    Years: 25.00    Types: Cigarettes    Quit date: 8/30/2015   Smokeless Tobacco    Never       Labs:  Lab Results   Component Value Date    A1C 9.5 08/27/2024    A1C 10.1 11/27/2020     Lab Results   Component Value Date     08/27/2024     02/03/2022     11/27/2020     Lab Results   Component Value Date    LDL 58 12/26/2023    LDL 74 11/27/2020     HDL Cholesterol   Date Value Ref Range Status   11/27/2020 48 23 - 92 mg/dL Final     Direct Measure HDL   Date Value Ref Range Status   12/26/2023 58 >=40 mg/dL Final   ]  GFR Estimate   Date " "Value Ref Range Status   08/27/2024 80 >60 mL/min/1.73m2 Final     Comment:     eGFR calculated using 2021 CKD-EPI equation.   11/27/2020 61 >60 mL/min/[1.73_m2] Final     GFR Estimate If Black   Date Value Ref Range Status   11/27/2020 74 >60 mL/min/[1.73_m2] Final     Lab Results   Component Value Date    CR 1.10 08/27/2024    CR 1.25 11/27/2020     No results found for: \"MICROALBUMIN\"    Healthy Eating:  Cultural/Roman Catholic diet restrictions?: No  How many times a week on average do you eat food made away from home (restaurant/take-out)?: 1  Meals include: Breakfast, Dinner, Afternoon Snack, Evening Snack  Beverages: Water, Juice    Being Active:  Barrier to exercise: Physical limitation    Monitoring:  Blood Glucose Meter: Accu-chek  Times checking blood sugar at home (number): Other (see Comments)  Please elaborate:: on a meter        Taking Medications:  Diabetes Medication(s)       Diabetic Other       glucose (BD GLUCOSE) 4 g chewable tablet Take 1 tablet by mouth every hour as needed for low blood sugar       Insulin       insulin aspart (NOVOLOG VIAL) 100 UNITS/ML vial USE PER INSULIN PUMP: BASAL: 0.4 UNIT/HOUR, BOLUS: ICR 1:15 GRAMS, ISF 1:100 MG/DL. MAX DAILY DOSE 200 UNITS            Current Treatments: Insulin Pump        Reducing Risks:  Has dilated eye exam at least once a year?: Yes  Sees dentist every 6 months?: No  Feet checked by healthcare provider in the last year?: Yes    Healthy Coping:  Informal Support system:: Family  Patient Activation Measure Survey Score:       No data to display                  Care Plan and Education Provided:  Care Plan: Diabetes   Updates made by Michelle Crow RN since 8/27/2024 12:00 AM        Problem: HbA1C Not In Goal         Goal: Get HbA1C Level in Goal         Task: Discuss diabetes treatment plan with patient Completed 8/27/2024   Responsible User: Michelle Crow RN        Problem: Diabetes Self-Management Education Needed to Optimize Self-Care Behaviors "         Goal: Understand diabetes pathophysiology and disease progression         Task: Provide education on diabetes pathophysiology and disease progression specfic to patient's diabetes type Completed 8/27/2024   Responsible User: Michelle Crow RN        Goal: Healthy Eating - follow a healthy eating pattern for diabetes         Task: Provide education on managing carbohydrate intake (carbohydrate counting, plate planning method, etc.) Completed 8/27/2024   Responsible User: Michelle Crow RN        Goal: Monitoring - monitor glucose and ketones as directed         Task: Provide education on continuous glucose monitoring (sensor placement, use of natasha or /reader, understanding glucose trends, alerts and alarms, differences between sensor glucose and blood glucose)    Responsible User: Michelle Crow RN   Note:    Discussed differences between Dexcom G6 and G7 CGM systems.  His current insurance does NOT cover Dexcom G7 CGM, but patient would like to update as soon as insurance coverage begins.       Goal: Reducing Risks - know how to prevent and treat long-term diabetes complications         Task: Provide education on major complications of diabetes, prevention, early diagnostic measures and treatment of complications Completed 8/27/2024   Responsible User: Michelle Crow RN Suzette Childs, RN, BSN, [facility]  8/27/2024 5:59 PM     Time Spent: 75 minutes  Encounter Type: Individual    Any diabetes medication dose changes were made via the CDE Protocol per the patient's primary care provider. A copy of this encounter was shared with the provider.

## 2024-08-27 NOTE — PATIENT INSTRUCTIONS
Diabetes Goals and Reminders    Your A1c test should be done every 3 months.  Your goal is less than 7%.   Your last result is:  Lab Results   Component Value Date    A1C 9.5 08/27/2024    A1C 10.1 11/27/2020       Your LDL cholesterol test should be done at least once a year.    Your last result is:  LDL Cholesterol Calculated   Date Value Ref Range Status   12/26/2023 58 <=100 mg/dL Final   11/27/2020 74 <100 mg/dL Final     Comment:     Desirable:       <100 mg/dl       Have blood pressure and weight checked every three months.  Your blood pressure goal is 140/90 or less.  Your last blood pressure reading was:    BP Readings from Last 1 Encounters:   08/27/24 137/89       Use your CGM to check sensor glucose readings a minimum of 4 times per day.  Your home glucose target ranges are:  Before meals:    2 hours after a meal:  Less than 180  Bedtime:  100-140 mg/dL      Avoid all tobacco.  Follow your healthy diet and exercise plan.  See the eye doctor every year.  See the dentist every six months.  Have kidney function tested yearly.    Take all medicine as prescribed.  Please let me know if you are having symptoms you don t expect or if you wish to stop any medicine.    Current Pump settings   Pump Type:  Tandem TSlim X2 insulin pump with Control IQ Technology   Insulin Type: Novolog  BASAL RATES and times:  12   AM (midnight): 0.3 unit/hour  Adjusted to 0.5 unit/hour  Total Basal programmed per 24 hours 7.2 units  Adjustment increases total Basal programmed to 12.0 units per 24 hours.  BOLUS settings:  Insulin to Carbohydrate Ratio (ICR):   1 unit per 15 grams of carbohydrate at 12:00 AM.    Insulin Sensitivity:   100 mg/dl at 12:00 AM.  Target Blood Glucose:   110 mg/dL per Control IQ Technology  Active Insulin: 5 hours per Control IQ Technology  Total Daily Dose: 26.12 units  Basal %: 46 (12.11 units delivered every 24 hours)  Bolus %: 54 (14.01 units delivered every 24 hours)    Follow Up Plan  Your  future lab plan is:  HgA1c in November 2024.    If you need your cholesterol checked at your next appointment, you should fast 8 to 10 hours before your appointment.  Do not eat or drink anything besides water.  Drink plenty of water and take all your usual medicine.    SUMMARY FOR TODAY'S VISIT    Dexcom Clarity CGM Review:        1.  Recommended pump adjustments:    A.  BASAL (background insulin):  Pump has been automatically dosing 12.11 units per 24 hours.  Adjusted basal from 0.3 unit/hour to 0.5 unit/hour to more closely match what the pump is automatically doing.  New 24 hour basal was increased from 7.2 to 12.0 units per 24 hours.    2.  Recommend correcting for high blood sugar using your BOLUS option in the pump BEFORE taking your insulin for your meal as the pump will not give insulin to correct a high glucose if you take your mealtime insulin first.      3.  Continue to try to take mealtime insulin BEFORE your meal to help prevent high glucose after the meals.    4.  Discussed use of Dexcom G7 CGM and working with [employer] to get your pump software updated.  Please remember your insurance ([employer] does NOT currently cover the Dexcom G7 CGM system.      5.  Please follow up for continued pump management in 2 - 4 weeks, or earlier as needed.      Michelle Crow RN, BSN, CDCES, CPT  8/27/2024 3:29 PM

## 2024-08-27 NOTE — LETTER
August 28, 2024      Rick Contreras  [address]   [place] [address]        Dear ,    We are writing to inform you of your test results.    Your A1c remains uncontrolled.  Keep up the hard work.    Signed, Adebayo Dubois MD, FAAP, FACP  Internal Medicine & Pediatrics      Resulted Orders   Hemoglobin A1c   Result Value Ref Range    Hemoglobin A1C 9.5 (H) 4.0 - 6.2 %   Basic metabolic panel   Result Value Ref Range    Sodium 134 (L) 135 - 145 mmol/L    Potassium 5.0 3.4 - 5.3 mmol/L    Chloride 100 98 - 107 mmol/L    Carbon Dioxide (CO2) 26 22 - 29 mmol/L    Anion Gap 8 7 - 15 mmol/L    Urea Nitrogen 17.3 6.0 - 20.0 mg/dL    Creatinine 1.10 0.67 - 1.17 mg/dL    GFR Estimate 80 >60 mL/min/1.73m2      Comment:      eGFR calculated using 2021 CKD-EPI equation.    Calcium 9.2 8.8 - 10.4 mg/dL      Comment:      Reference intervals for this test were updated on 7/16/2024 to reflect our healthy population more accurately. There may be differences in the flagging of prior results with similar values performed with this method. Those prior results can be interpreted in the context of the updated reference intervals.    Glucose 362 (H) 70 - 99 mg/dL   Prostate Specific Antigen Screen   Result Value Ref Range    Prostate Specific Antigen Screen 3.90 (H) 0.00 - 3.50 ng/mL    Narrative    This result is obtained using the Roche Elecsys total PSA method on the rosalind [id number] immunoassay analyzer. Results obtained with different assay methods or kits cannot be used interchangeably.   TSH with free T4 reflex   Result Value Ref Range    TSH 2.57 0.30 - 4.20 uIU/mL       If you have any questions or concerns, please call the clinic at the number listed above.       Sincerely,      Adebayo Dubois MD

## 2024-08-27 NOTE — PATIENT INSTRUCTIONS
-- Meet with Michelle today as planned.  You'll need more insulin late afternoon through evening.    -- Work on reducing carbs   -- Eat more veggies   -- See your dentist to get dentures fitted better   -- Then wear dentures daily     -- If we can reduce meloxicam it would be safer for kidney   -- Return to PT/OT   -- Try yoga or geovanny chi      Patient Education   Preventive Care Advice   This is general advice given by our system to help you stay healthy. However, your care team may have specific advice just for you. Please talk to your care team about your preventive care needs.  Nutrition  Eat 5 or more servings of fruits and vegetables each day.  Try wheat bread, brown rice and whole grain pasta (instead of white bread, rice, and pasta).  Get enough calcium and vitamin D. Check the label on foods and aim for 100% of the RDA (recommended daily allowance).  Lifestyle  Exercise at least 150 minutes each week  (30 minutes a day, 5 days a week).  Do muscle strengthening activities 2 days a week. These help control your weight and prevent disease.  No smoking.  Wear sunscreen to prevent skin cancer.  Have a dental exam and cleaning every 6 months.  Yearly exams  See your health care team every year to talk about:  Any changes in your health.  Any medicines your care team has prescribed.  Preventive care, family planning, and ways to prevent chronic diseases.  Shots (vaccines)   HPV shots (up to age 26), if you've never had them before.  Hepatitis B shots (up to age 59), if you've never had them before.  COVID-19 shot: Get this shot when it's due.  Flu shot: Get a flu shot every year.  Tetanus shot: Get a tetanus shot every 10 years.  Pneumococcal, hepatitis A, and RSV shots: Ask your care team if you need these based on your risk.  Shingles shot (for age 50 and up)  General health tests  Diabetes screening:  Starting at age 35, Get screened for diabetes at least every 3 years.  If you are younger than age 35, ask your  care team if you should be screened for diabetes.  Cholesterol test: At age 39, start having a cholesterol test every 5 years, or more often if advised.  Bone density scan (DEXA): At age 50, ask your care team if you should have this scan for osteoporosis (brittle bones).  Hepatitis C: Get tested at least once in your life.  STIs (sexually transmitted infections)  Before age 24: Ask your care team if you should be screened for STIs.  After age 24: Get screened for STIs if you're at risk. You are at risk for STIs (including HIV) if:  You are sexually active with more than one person.  You don't use condoms every time.  You or a partner was diagnosed with a sexually transmitted infection.  If you are at risk for HIV, ask about PrEP medicine to prevent HIV.  Get tested for HIV at least once in your life, whether you are at risk for HIV or not.  Cancer screening tests  Cervical cancer screening: If you have a cervix, begin getting regular cervical cancer screening tests starting at age 21.  Breast cancer scan (mammogram): If you've ever had breasts, begin having regular mammograms starting at age 40. This is a scan to check for breast cancer.  Colon cancer screening: It is important to start screening for colon cancer at age 45.  Have a colonoscopy test every 10 years (or more often if you're at risk) Or, ask your provider about stool tests like a FIT test every year or Cologuard test every 3 years.  To learn more about your testing options, visit:   .  For help making a decision, visit:   https://bit.ly/qy79511.  Prostate cancer screening test: If you have a prostate, ask your care team if a prostate cancer screening test (PSA) at age 55 is right for you.  Lung cancer screening: If you are a current or former smoker ages 50 to 80, ask your care team if ongoing lung cancer screenings are right for you.  For informational purposes only. Not to replace the advice of your health care provider. Copyright   2023 [facility]. All rights reserved. Clinically reviewed by the [facility] Transitions Program. [employer] 844837 - REV 01/24.

## 2024-08-27 NOTE — NURSING NOTE
"Chief Complaint   Patient presents with    Diabetes    Medicare Visit   Patient presents to the Clinic today for his medicare wellness visit and diabetic check.    Initial /89 (BP Location: Right arm, Patient Position: Sitting, Cuff Size: Adult Regular)   Pulse 73   Temp 97.5  F (36.4  C) (Tympanic)   Resp 18   Ht 1.715 m (5' 7.5\")   Wt 62.6 kg (138 lb)   SpO2 98%   BMI 21.29 kg/m   Estimated body mass index is 21.29 kg/m  as calculated from the following:    Height as of this encounter: 1.715 m (5' 7.5\").    Weight as of this encounter: 62.6 kg (138 lb).  Medication Review: complete    The next two questions are to help us understand your food security.  If you are feeling you need any assistance in this area, we have resources available to support you today.          8/27/2024   SDOH- Food Insecurity   Within the past 12 months, did you worry that your food would run out before you got money to buy more? N   Within the past 12 months, did the food you bought just not last and you didn t have money to get more? N            Health Care Directive:  Patient does not have a Health Care Directive or Living Will: Discussed advance care planning with patient; however, patient declined at this time.    Phyllis Godoy      "

## 2024-08-29 DIAGNOSIS — E11.65 TYPE 2 DIABETES MELLITUS WITH HYPERGLYCEMIA, WITH LONG-TERM CURRENT USE OF INSULIN (H): ICD-10-CM

## 2024-08-29 DIAGNOSIS — Z79.4 TYPE 2 DIABETES MELLITUS WITH HYPERGLYCEMIA, WITH LONG-TERM CURRENT USE OF INSULIN (H): ICD-10-CM

## 2024-08-29 RX ORDER — PROCHLORPERAZINE 25 MG/1
SUPPOSITORY RECTAL
Qty: 9 EACH | Refills: 11 | Status: SHIPPED | OUTPATIENT
Start: 2024-08-29

## 2024-08-29 NOTE — TELEPHONE ENCOUNTER
Pt is requesting new rx for    Dexcom g7 sensor    Did not see on active med list please verify and send new rx. Thank you!     [facility]  [phone]

## 2024-08-29 NOTE — TELEPHONE ENCOUNTER
Call to patient, notified of order being sent to pharmacy.  Shaniqua Reed RN on 8/29/2024 at 10:48 AM

## 2024-08-29 NOTE — TELEPHONE ENCOUNTER
Call to patient. He previously was using the dexcom G6. Diabetic educator note from 8/27/24 note does say patients insurance does not cover the dexcom G7.    Patient does need a refill of dexcom G6 sensors.  Pending order.     Routing to covering provider for refill consideration, as PCP/provider is out of clinic >48 hours or Pt is completely out of medication and provider is out of the clinic today.  Shaniqua Reed RN on 8/29/2024 at 9:05 AM

## 2024-09-03 ENCOUNTER — ORDERS ONLY (AUTO-RELEASED) (OUTPATIENT)
Dept: PEDIATRICS | Facility: OTHER | Age: 54
End: 2024-09-03
Payer: COMMERCIAL

## 2024-09-03 DIAGNOSIS — Z12.11 COLON CANCER SCREENING: ICD-10-CM

## 2024-10-01 LAB — NONINV COLON CA DNA+OCC BLD SCRN STL QL: NEGATIVE

## 2024-10-14 DIAGNOSIS — E11.65 TYPE 2 DIABETES MELLITUS WITH HYPERGLYCEMIA, WITH LONG-TERM CURRENT USE OF INSULIN (H): ICD-10-CM

## 2024-10-14 DIAGNOSIS — Z79.4 TYPE 2 DIABETES MELLITUS WITH HYPERGLYCEMIA, WITH LONG-TERM CURRENT USE OF INSULIN (H): ICD-10-CM

## 2024-10-16 DIAGNOSIS — E11.65 TYPE 2 DIABETES MELLITUS WITH HYPERGLYCEMIA, WITH LONG-TERM CURRENT USE OF INSULIN (H): ICD-10-CM

## 2024-10-16 DIAGNOSIS — Z79.4 TYPE 2 DIABETES MELLITUS WITH HYPERGLYCEMIA, WITH LONG-TERM CURRENT USE OF INSULIN (H): ICD-10-CM

## 2024-10-18 RX ORDER — PROCHLORPERAZINE 25 MG/1
SUPPOSITORY RECTAL
Qty: 1 EACH | Refills: 11 | OUTPATIENT
Start: 2024-10-18

## 2024-10-18 NOTE — TELEPHONE ENCOUNTER
[facility] sent Rx request for the following:      Redundant refill request refused: Too soon:  Continuous Glucose Transmitter (DEXCOM G6 TRANSMITTER) MISC 1 each 11 5/7/2024 -- No   Sig: CHANGE EVERY THREE MONTHS   Sent to pharmacy as: Dexcom G6 Transmitter   Class: E-Prescribe   Order: [id number]   E-Prescribing Status: Receipt confirmed by pharmacy (5/7/2024  9:46 AM CDT)   Renewals    Renewal provider: Wayne Espinoza MD        Printout Tracking    External Result Report     Medication Administration Instructions    CHANGE EVERY THREE MONTHS     Pharmacy    [facility] - [place], [place] - [address] Ramirez RN .............. 10/18/2024  1:35 PM

## 2024-10-21 DIAGNOSIS — Z79.4 TYPE 2 DIABETES MELLITUS WITH HYPERGLYCEMIA, WITH LONG-TERM CURRENT USE OF INSULIN (H): ICD-10-CM

## 2024-10-21 DIAGNOSIS — E11.65 TYPE 2 DIABETES MELLITUS WITH HYPERGLYCEMIA, WITH LONG-TERM CURRENT USE OF INSULIN (H): ICD-10-CM

## 2024-10-23 ENCOUNTER — OFFICE VISIT (OUTPATIENT)
Dept: FAMILY MEDICINE | Facility: OTHER | Age: 54
End: 2024-10-23
Attending: NURSE PRACTITIONER
Payer: COMMERCIAL

## 2024-10-23 VITALS
WEIGHT: 142.6 LBS | RESPIRATION RATE: 16 BRPM | SYSTOLIC BLOOD PRESSURE: 136 MMHG | OXYGEN SATURATION: 97 % | HEART RATE: 99 BPM | DIASTOLIC BLOOD PRESSURE: 88 MMHG | HEIGHT: 68 IN | TEMPERATURE: 97.9 F | BODY MASS INDEX: 21.61 KG/M2
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
## 2024-10-23 DIAGNOSIS — S61.451A: Primary | ICD-10-CM

## 2024-10-23 DIAGNOSIS — L08.9: Primary | ICD-10-CM

## 2024-10-23 PROCEDURE — G0463 HOSPITAL OUTPT CLINIC VISIT: HCPCS

## 2024-10-23 PROCEDURE — 99213 OFFICE O/P EST LOW 20 MIN: CPT | Performed by: NURSE PRACTITIONER

## 2024-10-23 ASSESSMENT — ENCOUNTER SYMPTOMS
WOUND: 1
GASTROINTESTINAL NEGATIVE: 1
RESPIRATORY NEGATIVE: 1
PSYCHIATRIC NEGATIVE: 1
MUSCULOSKELETAL NEGATIVE: 1
CARDIOVASCULAR NEGATIVE: 1
NEUROLOGICAL NEGATIVE: 1
CONSTITUTIONAL NEGATIVE: 1
HEMATOLOGIC/LYMPHATIC NEGATIVE: 1

## 2024-10-23 ASSESSMENT — PAIN SCALES - GENERAL: PAINLEVEL_OUTOF10: MODERATE PAIN (4)

## 2024-10-23 NOTE — PROGRESS NOTES
Rick Contreras  1970    ASSESSMENT/PLAN      Presents to [facility] with redness and swelling of right palm.  Patient was bit by his cat 2 weeks ago.  Cat is up-to-date on vaccinations.  Patient is not concerned about rabies.  Will treat with Augmentin.  Patient's vitals are stable and he appears nontoxic.        1. Cat bite of right palm with infection, initial encounter (Primary)    - amoxicillin-clavulanate (AUGMENTIN) 875-125 MG tablet; Take 1 tablet by mouth 2 times daily for 10 days.  Dispense: 20 tablet; Refill: 0   - May use over-the-counter Tylenol or ibuprofen PRN  - Follow up as needed for new or worsening symptoms          *Explanation of diagnosis, treatment options and risk and benefits of medications reviewed with patient. Patient agrees with plan of care.  *All questions were answered.    *Red flags symptoms were discussed and patient was advised when they should return for reevaluation or for prompt emergency evaluation.   *Patient was given verbal and written instructions on plan of care. Instructions were printed or are available on [employer] on electronic AVS.   *We discussed potential side effects of any prescribed or recommended therapies, as well as expectations for response to treatments.  *Patient discharged in stable condition    Gracie Weems CNP  [facility]    SUBJECTIVE  CHIEF COMPLAINT/ REASON FOR VISIT  Patient presents with:  Infection: Right Hand      HISTORY OF PRESENT ILLNESS  Rick Contreras is a pleasant 54 year old male presents to [facility] clinic today with redness and swelling in his right hand.  Patient was bitten by his cat 2 weeks ago and has had worsening symptoms since then.  Patient has not tried any treatment and has not been seen for this bite.      I have reviewed the nursing notes.  I have reviewed allergies, medication list, problem list, and past medical history.    REVIEW OF SYSTEMS  Review of Systems   Constitutional: Negative.   "  HENT: Negative.     Respiratory: Negative.     Cardiovascular: Negative.    Gastrointestinal: Negative.    Genitourinary: Negative.    Musculoskeletal: Negative.    Skin:  Positive for rash and wound.   Neurological: Negative.    Hematological: Negative.    Psychiatric/Behavioral: Negative.     All other systems reviewed and are negative.       VITAL SIGNS  Vitals:    10/23/24 0953   BP: 136/88   Pulse: 99   Resp: 16   Temp: 97.9  F (36.6  C)   TempSrc: Temporal   SpO2: 97%   Weight: 64.7 kg (142 lb 9.6 oz)   Height: 1.727 m (5' 8\")      Body mass index is 21.68 kg/m .      OBJECTIVE  PHYSICAL EXAM  Physical Exam  Vitals and nursing note reviewed.   Constitutional:       Appearance: Normal appearance.   HENT:      Head: Normocephalic.      Nose: Nose normal.      Mouth/Throat:      Mouth: Mucous membranes are moist.   Eyes:      Pupils: Pupils are equal, round, and reactive to light.   Cardiovascular:      Rate and Rhythm: Normal rate and regular rhythm.      Pulses: Normal pulses.      Heart sounds: Normal heart sounds.   Pulmonary:      Effort: Pulmonary effort is normal.      Breath sounds: Normal breath sounds.   Abdominal:      General: Bowel sounds are normal.   Musculoskeletal:         General: Swelling, tenderness and signs of injury present. Normal range of motion.      Cervical back: Normal range of motion.      Comments: Right palm with erythema, tenderness.   Skin:     General: Skin is warm and dry.      Capillary Refill: Capillary refill takes less than 2 seconds.      Findings: Erythema present.   Neurological:      General: No focal deficit present.      Mental Status: He is alert.          "

## 2024-10-23 NOTE — NURSING NOTE
"Chief Complaint   Patient presents with    Infection     Right Hand      Patient presents to the  today complaining of an infection in his right hand. Patient stated he was bitten by his cat 2 weeks ago and the site had purulent drainage if he would squeeze it. The patient stated then he stopped to see if it would heal on its own. The cat bite site has since worsened now with swelling, warmth, and redness stated by the patient. Patient has not tried any OTC treatments at this time.   Amparo Lam LPN on 10/23/2024 at 9:56 AM   Ext. 1161     Initial /88   Pulse 99   Temp 97.9  F (36.6  C) (Temporal)   Resp 16   Ht 1.727 m (5' 8\")   Wt 64.7 kg (142 lb 9.6 oz)   SpO2 97%   BMI 21.68 kg/m   Estimated body mass index is 21.68 kg/m  as calculated from the following:    Height as of this encounter: 1.727 m (5' 8\").    Weight as of this encounter: 64.7 kg (142 lb 9.6 oz).  Medication Reconciliation: complete      Amparo Lam LPN  "

## 2024-10-28 RX ORDER — INSULIN PUMP CARTRIDGE
CARTRIDGE (EA) SUBCUTANEOUS
Qty: 30 EACH | Refills: 11 | Status: SHIPPED | OUTPATIENT
Start: 2024-10-28

## 2024-10-28 NOTE — TELEPHONE ENCOUNTER
Insulin Infusion Pump Supplies       Last Written Prescription Date:  10/4/23  Last Fill Quantity: 30,   # refills: 11  Last Office Visit: 8/27/24  Future Office visit:    Next 5 appointments (look out 90 days)      Nov 27, 2024 2:20 PM  (Arrive by 2:05 PM)  Provider Visit with Adebayo Dubois MD  [facility] ([facility] ) [address]  [place] [address]  [phone]             Routing refill request to provider for review/approval because:  Drug not on the [facility], [facility] or [facility] refill protocol or controlled substance Erma Salcedo RN on 10/28/2024 at 8:37 AM

## 2024-11-04 DIAGNOSIS — E03.9 ACQUIRED HYPOTHYROIDISM: ICD-10-CM

## 2024-11-06 RX ORDER — LEVOTHYROXINE SODIUM 112 UG/1
112 TABLET ORAL DAILY
Qty: 90 TABLET | Refills: 0 | Status: SHIPPED | OUTPATIENT
Start: 2024-11-06

## 2024-11-06 NOTE — TELEPHONE ENCOUNTER
[facility] sent Rx request for the following:      Requested Prescriptions   Pending Prescriptions Disp Refills    levothyroxine (SYNTHROID/LEVOTHROID) 112 MCG tablet [Pharmacy Med Name: Levothyroxine Sodium 112 MCG Oral Tablet] 90 tablet 0     Sig: Take 1 tablet by mouth once daily      Last Prescription Date:   12/26/2023  Last Fill Qty/Refills:         90, R-3    Last Office Visit:              08/27/2024   Future Office visit:           11/27/2024  Prescription approved per [facility] Refill Protocol.   Shandra Yates RN on 11/6/2024 at 9:57 AM

## 2024-11-27 ENCOUNTER — OFFICE VISIT (OUTPATIENT)
Dept: PEDIATRICS | Facility: OTHER | Age: 54
End: 2024-11-27
Attending: INTERNAL MEDICINE
Payer: COMMERCIAL

## 2024-11-27 VITALS
HEART RATE: 82 BPM | DIASTOLIC BLOOD PRESSURE: 82 MMHG | HEIGHT: 68 IN | WEIGHT: 142 LBS | RESPIRATION RATE: 16 BRPM | BODY MASS INDEX: 21.52 KG/M2 | SYSTOLIC BLOOD PRESSURE: 132 MMHG | TEMPERATURE: 98 F | OXYGEN SATURATION: 98 %

## 2024-11-27 DIAGNOSIS — E10.9 TYPE 1 DIABETES MELLITUS WITHOUT COMPLICATION (H): Primary | Chronic | ICD-10-CM

## 2024-11-27 LAB
ANION GAP SERPL CALCULATED.3IONS-SCNC: 6 MMOL/L (ref 7–15)
BUN SERPL-MCNC: 14.4 MG/DL (ref 6–20)
CALCIUM SERPL-MCNC: 9 MG/DL (ref 8.8–10.4)
CHLORIDE SERPL-SCNC: 103 MMOL/L (ref 98–107)
CHOLEST SERPL-MCNC: 151 MG/DL
CREAT SERPL-MCNC: 1.09 MG/DL (ref 0.67–1.17)
CREAT UR-MCNC: 73.9 MG/DL
EGFRCR SERPLBLD CKD-EPI 2021: 81 ML/MIN/1.73M2
EST. AVERAGE GLUCOSE BLD GHB EST-MCNC: 203 MG/DL
FASTING STATUS PATIENT QL REPORTED: NO
FASTING STATUS PATIENT QL REPORTED: NO
GLUCOSE SERPL-MCNC: 273 MG/DL (ref 70–99)
HBA1C MFR BLD: 8.7 %
HCO3 SERPL-SCNC: 27 MMOL/L (ref 22–29)
HDLC SERPL-MCNC: 71 MG/DL
LDLC SERPL CALC-MCNC: 66 MG/DL
MICROALBUMIN UR-MCNC: <12 MG/L
MICROALBUMIN/CREAT UR: NORMAL MG/G{CREAT}
NONHDLC SERPL-MCNC: 80 MG/DL
POTASSIUM SERPL-SCNC: 4.7 MMOL/L (ref 3.4–5.3)
SODIUM SERPL-SCNC: 136 MMOL/L (ref 135–145)
TRIGL SERPL-MCNC: 71 MG/DL

## 2024-11-27 PROCEDURE — 82043 UR ALBUMIN QUANTITATIVE: CPT | Mod: ZL | Performed by: INTERNAL MEDICINE

## 2024-11-27 PROCEDURE — 90480 ADMN SARSCOV2 VAC 1/ONLY CMP: CPT

## 2024-11-27 PROCEDURE — G0463 HOSPITAL OUTPT CLINIC VISIT: HCPCS | Mod: 25

## 2024-11-27 PROCEDURE — 90673 RIV3 VACCINE NO PRESERV IM: CPT

## 2024-11-27 PROCEDURE — 36415 COLL VENOUS BLD VENIPUNCTURE: CPT | Mod: ZL | Performed by: INTERNAL MEDICINE

## 2024-11-27 PROCEDURE — G0008 ADMIN INFLUENZA VIRUS VAC: HCPCS

## 2024-11-27 PROCEDURE — 80051 ELECTROLYTE PANEL: CPT | Mod: ZL | Performed by: INTERNAL MEDICINE

## 2024-11-27 PROCEDURE — 80048 BASIC METABOLIC PNL TOTAL CA: CPT | Mod: ZL | Performed by: INTERNAL MEDICINE

## 2024-11-27 PROCEDURE — 80061 LIPID PANEL: CPT | Mod: ZL | Performed by: INTERNAL MEDICINE

## 2024-11-27 PROCEDURE — 82565 ASSAY OF CREATININE: CPT | Mod: ZL | Performed by: INTERNAL MEDICINE

## 2024-11-27 PROCEDURE — 82570 ASSAY OF URINE CREATININE: CPT | Mod: ZL | Performed by: INTERNAL MEDICINE

## 2024-11-27 PROCEDURE — 83036 HEMOGLOBIN GLYCOSYLATED A1C: CPT | Mod: ZL | Performed by: INTERNAL MEDICINE

## 2024-11-27 ASSESSMENT — PAIN SCALES - GENERAL: PAINLEVEL_OUTOF10: NO PAIN (0)

## 2024-11-27 NOTE — PROGRESS NOTES
Assessment & Plan   1. Type 1 diabetes mellitus without complication (H) (Primary)  Continuous Glucose Monitor (CGM) Professional interpretation procedural note  Rick Contreras is using their CGM and is benefiting from it.   I personally reviewed more than 72 hours of CGM data for Mr. Rick Contreras 11/27/2024.   Reviewed his Dexcom clarity.  39% in range, 38% high, 23% very high.  He tends to be at his highest between about 10 PM and 3 AM.  He reports he eats ice cream every night before bed so that he can avoid hypoglycemia.  He often gets up and has a snack in the middle of the night.  We had a lengthy discussion today with regards to dietary changes and diabetes mellitus type 1.  I urged him to trust his technology such that he should not need to eat before bed to avoid hypoglycemia.  His technology should awaken him should he develop hypoglycemia.  He has had 0 hypoglycemic events in the last 90 days.  I would like to follow him closely.  Really I would like to add more insulin, increase basal rate at this time however he is concerned about hypoglycemic events.  We will have him attempt to discontinue his evening and nighttime snacking and then reevaluate his trends.  I encouraged healthy diabetic diet.    Recommendations:  Based on the patterns and trends, the following recommendations are made today:   -- Medication changes, if applicable outlined below.   -- Close follow-up is recommended for in person follow-up and review, with ongoing therapy adjustments.      - Hemoglobin A1c; Future  - Basic metabolic panel; Future  - Lipid Profile; Future  - Albumin Random Urine Quantitative with Creat Ratio; Future      Patient Instructions    -- Stop eating ice cream before bed   -- No snacks before bed. If you are hungry, try to have something diabetes friendly eg cottage cheese, etc.   -- Do not eat in the night unless glucose is less than 70   -- Follow-up in 1 month with Dr. Dubois to review CGM and pump   --  "We may be able to increase basal rate at that time      Return in about 1 month (around 12/27/2024), or if symptoms worsen or fail to improve, for diabetes.    Signed, Adebayo Dubois MD, FAAP, FACP  Internal Medicine & Pediatrics    Subjective   Rick Contreras is a 54 year old male who presents for Diabetes and Recheck Medication.    Objective   Vitals: /82   Pulse 82   Temp 98  F (36.7  C) (Tympanic)   Resp 16   Ht 1.727 m (5' 8\")   Wt 64.4 kg (142 lb)   SpO2 98%   BMI 21.59 kg/m        Review and Analysis of Data   I personally reviewed the following:  External notes: Yes DEXCOM  Results: Yes diabetic lab  Use of an independent historian: No  Independent review of a test performed by another physician: No  Discussion of management with another physician: No  Moderate risk of morbidity from additional diagnostic testing and/or treatment.    "

## 2024-11-27 NOTE — PATIENT INSTRUCTIONS
-- Stop eating ice cream before bed   -- No snacks before bed. If you are hungry, try to have something diabetes friendly eg cottage cheese, etc.   -- Do not eat in the night unless glucose is less than 70   -- Follow-up in 1 month with Dr. Dubois to review CGM and pump   -- We may be able to increase basal rate at that time

## 2024-11-27 NOTE — NURSING NOTE
"Chief Complaint   Patient presents with    Diabetes    Recheck Medication     Uses a Dexcom G6     Initial /82   Pulse 82   Temp 98  F (36.7  C) (Tympanic)   Resp 16   Ht 1.727 m (5' 8\")   Wt 64.4 kg (142 lb)   SpO2 98%   BMI 21.59 kg/m   Estimated body mass index is 21.59 kg/m  as calculated from the following:    Height as of this encounter: 1.727 m (5' 8\").    Weight as of this encounter: 64.4 kg (142 lb).  Medication Review: complete    The next two questions are to help us understand your food security.  If you are feeling you need any assistance in this area, we have resources available to support you today.          8/27/2024   SDOH- Food Insecurity   Within the past 12 months, did you worry that your food would run out before you got money to buy more? N    Within the past 12 months, did the food you bought just not last and you didn t have money to get more? N        Patient-reported         Health Care Directive:  Patient does not have a Health Care Directive: Discussed advance care planning with patient; however, patient declined at this time.    Norma J. Gosselin, LPN      "

## 2024-11-27 NOTE — LETTER
November 27, 2024      Rick Contreras  [address]   [place] [address]        Dear ,    We are writing to inform you of your test results.    We are starting to make some improvement in your A1c going down into the 8 range!  Keep up the hard work.  I hope to see you in 1 month.    Signed, Adebayo Dubois MD, FAAP, FACP  Internal Medicine & Pediatrics      Resulted Orders   Hemoglobin A1c   Result Value Ref Range    Estimated Average Glucose 203 (H) <117 mg/dL    Hemoglobin A1C 8.7 (H) <5.7 %      Comment:      Normal <5.7%   Prediabetes 5.7-6.4%    Diabetes 6.5% or higher     Note: Adopted from ADA consensus guidelines.   Basic metabolic panel   Result Value Ref Range    Sodium 136 135 - 145 mmol/L    Potassium 4.7 3.4 - 5.3 mmol/L    Chloride 103 98 - 107 mmol/L    Carbon Dioxide (CO2) 27 22 - 29 mmol/L    Anion Gap 6 (L) 7 - 15 mmol/L    Urea Nitrogen 14.4 6.0 - 20.0 mg/dL    Creatinine 1.09 0.67 - 1.17 mg/dL    GFR Estimate 81 >60 mL/min/1.73m2      Comment:      eGFR calculated using 2021 CKD-EPI equation.    Calcium 9.0 8.8 - 10.4 mg/dL      Comment:      Reference intervals for this test were updated on 7/16/2024 to reflect our healthy population more accurately. There may be differences in the flagging of prior results with similar values performed with this method. Those prior results can be interpreted in the context of the updated reference intervals.    Glucose 273 (H) 70 - 99 mg/dL    Patient Fasting > 8hrs? No    Lipid Profile   Result Value Ref Range    Cholesterol 151 <200 mg/dL    Triglycerides 71 <150 mg/dL    Direct Measure HDL 71 >=40 mg/dL    LDL Cholesterol Calculated 66 <100 mg/dL    Non HDL Cholesterol 80 <130 mg/dL    Patient Fasting > 8hrs? No     Narrative    Cholesterol  Desirable: < 200 mg/dL  Borderline High: 200 - 239 mg/dL  High: >= 240 mg/dL    Triglycerides  Normal: < 150 mg/dL  Borderline High: 150 - 199 mg/dL  High: 200-499 mg/dL  Very High: >= 500 mg/dL    Direct Measure  HDL  Female: >= 50 mg/dL   Male: >= 40 mg/dL    LDL Cholesterol  Desirable: < 100 mg/dL  Above Desirable: 100 - 129 mg/dL   Borderline High: 130 - 159 mg/dL   High:  160 - 189 mg/dL   Very High: >= 190 mg/dL    Non HDL Cholesterol  Desirable: < 130 mg/dL  Above Desirable: 130 - 159 mg/dL  Borderline High: 160 - 189 mg/dL  High: 190 - 219 mg/dL  Very High: >= 220 mg/dL   Albumin Random Urine Quantitative with Creat Ratio   Result Value Ref Range    Creatinine Urine mg/dL 73.9 mg/dL      Comment:      The reference ranges have not been established in urine creatinine. The results should be integrated into the clinical context for interpretation.    Albumin Urine mg/L <12.0 mg/L      Comment:      The reference ranges have not been established in urine albumin. The results should be integrated into the clinical context for interpretation.    Albumin Urine mg/g Cr        Comment:      Unable to calculate, urine albumin and/or urine creatinine is outside detectable limits.  Microalbuminuria is defined as an albumin:creatinine ratio of 17 to 299 for males and 25 to 299 for females. A ratio of albumin:creatinine of 300 or higher is indicative of overt proteinuria.  Due to biologic variability, positive results should be confirmed by a second, first-morning random or 24-hour timed urine specimen. If there is discrepancy, a third specimen is recommended. When 2 out of 3 results are in the microalbuminuria range, this is evidence for incipient nephropathy and warrants increased efforts at glucose control, blood pressure control, and institution of therapy with an angiotensin-converting-enzyme (ACE) inhibitor (if the patient can tolerate it).         If you have any questions or concerns, please call the clinic at the number listed above.       Sincerely,      Adebayo Dubois MD

## 2025-02-11 DIAGNOSIS — E11.65 UNCONTROLLED TYPE 2 DIABETES MELLITUS WITH HYPERGLYCEMIA (H): ICD-10-CM

## 2025-02-11 DIAGNOSIS — E03.9 ACQUIRED HYPOTHYROIDISM: ICD-10-CM

## 2025-02-18 RX ORDER — BLOOD SUGAR DIAGNOSTIC
STRIP MISCELLANEOUS
Qty: 100 STRIP | Refills: 1 | Status: SHIPPED | OUTPATIENT
Start: 2025-02-18

## 2025-02-18 RX ORDER — LEVOTHYROXINE SODIUM 112 UG/1
112 TABLET ORAL DAILY
Qty: 90 TABLET | Refills: 1 | Status: SHIPPED | OUTPATIENT
Start: 2025-02-18

## 2025-02-18 NOTE — TELEPHONE ENCOUNTER
[id number] sent Rx request for the following:      Requested Prescriptions   Pending Prescriptions Disp Refills    ACCU-CHEK GUIDE TEST test strip [Pharmacy Med Name: Accu-Chek Guide In Vitro Strip]  0     Sig: USE   TO CHECK GLUCOSE TWICE DAILY       Diabetic Supplies Protocol Failed - 2/18/2025  8:42 AM        Failed - Medication is active on med list and the sig matches. RN to manually verify dose and sig if red X/fail.      Last Prescription Date:   11/24/2023  Last Fill Qty/Refills:         100, R-11    Last Office Visit:              11/27/2024   Future Office visit:           none     Prescription approved per [facility] Refill Protocol. Sig verified and same as chart.  Niraj Mo RN on 2/18/2025 at 8:47 AM            levothyroxine (SYNTHROID/LEVOTHROID) 112 MCG tablet [Pharmacy Med Name: Levothyroxine Sodium 112 MCG Oral Tablet] 90 tablet 0     Sig: Take 1 tablet by mouth once daily       Thyroid Protocol Passed - 2/18/2025  8:42 AM        Last Prescription Date:   11/6/2024  Last Fill Qty/Refills:         90, R-0    Last Office Visit:              8/27/2024   Future Office visit:           none     Prescription approved per [facility] Refill Protocol.  Niraj Mo RN on 2/18/2025 at 8:47 AM

## 2025-02-28 ENCOUNTER — MEDICAL CORRESPONDENCE (OUTPATIENT)
Dept: HEALTH INFORMATION MANAGEMENT | Facility: OTHER | Age: 55
End: 2025-02-28
Payer: COMMERCIAL

## 2025-03-31 DIAGNOSIS — Z79.4 TYPE 2 DIABETES MELLITUS WITH HYPERGLYCEMIA, WITH LONG-TERM CURRENT USE OF INSULIN (H): ICD-10-CM

## 2025-03-31 DIAGNOSIS — E11.65 TYPE 2 DIABETES MELLITUS WITH HYPERGLYCEMIA, WITH LONG-TERM CURRENT USE OF INSULIN (H): ICD-10-CM

## 2025-04-01 RX ORDER — INFUSION SET FOR INSULIN PUMP
INFUSION SETS-PARAPHERNALIA MISCELLANEOUS
Qty: 30 EACH | Refills: 11 | Status: SHIPPED | OUTPATIENT
Start: 2025-04-01

## 2025-04-01 NOTE — TELEPHONE ENCOUNTER
"  Requested Prescriptions   Pending Prescriptions Disp Refills    Insulin Infusion Pump Supplies (AUTOSOFT 30 INFUSION SET) MISC [Pharmacy Med Name: AUTOSOFT 30 INF SET 13MM 43\"]  11     Sig: CHANGE EVERY 3 DAYS       There is no refill protocol information for this order        Last Prescription Date:   3/26/24  Last Fill Qty/Refills:         30, R-11    Last Office Visit:              11/27/24 (DM discussed)   Future Office visit:           None    Per LOV note:  Return in about 1 month (around 12/27/2024), or if symptoms worsen or fail to improve, for diabetes.     Pt cancelled 12/30/24 appointment. Note states:  Cancel Reason: Other (Patient called to cancell, did not want to reschedule at this time     Unable to complete prescription refill per RN Medication Refill Policy. Rebekah Ramirez RN .............. 4/1/2025  9:39 AM    "

## 2025-04-01 NOTE — TELEPHONE ENCOUNTER
Reason for call: Medication or medication refill    Have you contacted your pharmacy regarding this refill? yes    If No, direct the patient to contact the Pharmacy and discontinue telephone note using Erroneous Encounter.  If Yes, continue.    Name of medication requested: insulin pump supplies    How many days of medication do you have left? 3    What pharmacy do you use? [facility]     Preferred method for responding to this message: Telephone Call    Phone number patient can be reached at: Cell number on file:    Telephone Information:   Mobile [phone]       If we cannot reach you directly, may we leave a detailed response at the number you provided? Yes    Dorothea Sanches on 4/1/2025 at 9:34 AM

## 2025-05-26 ENCOUNTER — APPOINTMENT (OUTPATIENT)
Dept: CT IMAGING | Facility: OTHER | Age: 55
End: 2025-05-26
Attending: FAMILY MEDICINE
Payer: COMMERCIAL

## 2025-05-26 ENCOUNTER — APPOINTMENT (OUTPATIENT)
Dept: GENERAL RADIOLOGY | Facility: OTHER | Age: 55
End: 2025-05-26
Attending: FAMILY MEDICINE
Payer: COMMERCIAL

## 2025-05-26 ENCOUNTER — HOSPITAL ENCOUNTER (INPATIENT)
Facility: OTHER | Age: 55
LOS: 1 days | Discharge: HOME | End: 2025-05-27
Attending: FAMILY MEDICINE | Admitting: FAMILY MEDICINE
Payer: COMMERCIAL

## 2025-05-26 DIAGNOSIS — E10.9 TYPE 1 DIABETES MELLITUS WITHOUT COMPLICATION (H): Chronic | ICD-10-CM

## 2025-05-26 DIAGNOSIS — E08.10 DIABETIC KETOACIDOSIS WITHOUT COMA ASSOCIATED WITH DIABETES MELLITUS DUE TO UNDERLYING CONDITION (H): ICD-10-CM

## 2025-05-26 DIAGNOSIS — F10.90 ALCOHOL USE: ICD-10-CM

## 2025-05-26 DIAGNOSIS — Z96.41 INSULIN PUMP STATUS: ICD-10-CM

## 2025-05-26 DIAGNOSIS — E10.10 TYPE 1 DIABETES MELLITUS WITH KETOACIDOSIS WITHOUT COMA (H): Primary | ICD-10-CM

## 2025-05-26 PROBLEM — F10.20 UNCOMPLICATED ALCOHOL DEPENDENCE (H): Status: ACTIVE | Noted: 2025-05-26

## 2025-05-26 PROBLEM — F10.239 ALCOHOL DEPENDENCE WITH WITHDRAWAL (H): Status: ACTIVE | Noted: 2025-05-26

## 2025-05-26 PROBLEM — E11.10 DKA (DIABETIC KETOACIDOSIS) (H): Status: ACTIVE | Noted: 2025-05-26

## 2025-05-26 PROBLEM — E03.9 ACQUIRED HYPOTHYROIDISM: Chronic | Status: ACTIVE | Noted: 2022-02-03

## 2025-05-26 LAB
ALBUMIN SERPL BCG-MCNC: 4.1 G/DL (ref 3.5–5.2)
ALBUMIN UR-MCNC: NEGATIVE MG/DL
ALP SERPL-CCNC: 67 U/L (ref 40–150)
ALT SERPL W P-5'-P-CCNC: 29 U/L (ref 0–70)
AMMONIA PLAS-SCNC: 26 UMOL/L (ref 16–60)
AMPHETAMINES UR QL SCN: ABNORMAL
ANION GAP SERPL CALCULATED.3IONS-SCNC: 12 MMOL/L (ref 7–15)
ANION GAP SERPL CALCULATED.3IONS-SCNC: 28 MMOL/L (ref 7–15)
APPEARANCE UR: CLEAR
AST SERPL W P-5'-P-CCNC: 30 U/L (ref 0–45)
B-OH-BUTYR SERPL-SCNC: 1.26 MMOL/L
B-OH-BUTYR SERPL-SCNC: 4.01 MMOL/L
BARBITURATES UR QL SCN: ABNORMAL
BASE EXCESS BLDV CALC-SCNC: -12.1 MMOL/L (ref -3–3)
BASOPHILS # BLD AUTO: 0 10E3/UL (ref 0–0.2)
BASOPHILS NFR BLD AUTO: 0 %
BENZODIAZ UR QL SCN: ABNORMAL
BILIRUB SERPL-MCNC: 1.1 MG/DL
BILIRUB UR QL STRIP: NEGATIVE
BUN SERPL-MCNC: 20 MG/DL (ref 6–20)
BUN SERPL-MCNC: 21.7 MG/DL (ref 6–20)
BZE UR QL SCN: ABNORMAL
CALCIUM SERPL-MCNC: 8.3 MG/DL (ref 8.8–10.4)
CALCIUM SERPL-MCNC: 9 MG/DL (ref 8.8–10.4)
CANNABINOIDS UR QL SCN: ABNORMAL
CHLORIDE SERPL-SCNC: 103 MMOL/L (ref 98–107)
CHLORIDE SERPL-SCNC: 99 MMOL/L (ref 98–107)
COLOR UR AUTO: YELLOW
CREAT SERPL-MCNC: 1.05 MG/DL (ref 0.67–1.17)
CREAT SERPL-MCNC: 1.15 MG/DL (ref 0.67–1.17)
EGFRCR SERPLBLD CKD-EPI 2021: 75 ML/MIN/1.73M2
EGFRCR SERPLBLD CKD-EPI 2021: 84 ML/MIN/1.73M2
EOSINOPHIL # BLD AUTO: 0.1 10E3/UL (ref 0–0.7)
EOSINOPHIL NFR BLD AUTO: 1 %
ERYTHROCYTE [DISTWIDTH] IN BLOOD BY AUTOMATED COUNT: 12.8 % (ref 10–15)
EST. AVERAGE GLUCOSE BLD GHB EST-MCNC: 203 MG/DL
ETHANOL SERPL-MCNC: <0.01 G/DL
FENTANYL UR QL: ABNORMAL
GLUCOSE BLDC GLUCOMTR-MCNC: 105 MG/DL (ref 70–99)
GLUCOSE BLDC GLUCOMTR-MCNC: 127 MG/DL (ref 70–99)
GLUCOSE BLDC GLUCOMTR-MCNC: 134 MG/DL (ref 70–99)
GLUCOSE BLDC GLUCOMTR-MCNC: 214 MG/DL (ref 70–99)
GLUCOSE BLDC GLUCOMTR-MCNC: 315 MG/DL (ref 70–99)
GLUCOSE BLDC GLUCOMTR-MCNC: 363 MG/DL (ref 70–99)
GLUCOSE BLDC GLUCOMTR-MCNC: 384 MG/DL (ref 70–99)
GLUCOSE BLDC GLUCOMTR-MCNC: 399 MG/DL (ref 70–99)
GLUCOSE BLDC GLUCOMTR-MCNC: 439 MG/DL (ref 70–99)
GLUCOSE BLDC GLUCOMTR-MCNC: 92 MG/DL (ref 70–99)
GLUCOSE SERPL-MCNC: 351 MG/DL (ref 70–99)
GLUCOSE SERPL-MCNC: 98 MG/DL (ref 70–99)
GLUCOSE UR STRIP-MCNC: >1000 MG/DL
HBA1C MFR BLD: 8.7 %
HCO3 BLDV-SCNC: 15 MMOL/L (ref 21–28)
HCO3 SERPL-SCNC: 12 MMOL/L (ref 22–29)
HCO3 SERPL-SCNC: 20 MMOL/L (ref 22–29)
HCT VFR BLD AUTO: 38.9 % (ref 40–53)
HGB BLD-MCNC: 12.9 G/DL (ref 13.3–17.7)
HGB UR QL STRIP: NEGATIVE
HOLD SPECIMEN: NORMAL
HYALINE CASTS: 6 /LPF
IMM GRANULOCYTES # BLD: 0.1 10E3/UL
IMM GRANULOCYTES NFR BLD: 1 %
KETONES UR STRIP-MCNC: 40 MG/DL
LACTATE SERPL-SCNC: 2.7 MMOL/L (ref 0.7–2)
LACTATE SERPL-SCNC: 4.5 MMOL/L (ref 0.7–2)
LACTATE SERPL-SCNC: 4.9 MMOL/L (ref 0.7–2)
LACTATE SERPL-SCNC: 9 MMOL/L (ref 0.7–2)
LEUKOCYTE ESTERASE UR QL STRIP: NEGATIVE
LIPASE SERPL-CCNC: 15 U/L (ref 13–60)
LYMPHOCYTES # BLD AUTO: 1.7 10E3/UL (ref 0.8–5.3)
LYMPHOCYTES NFR BLD AUTO: 12 %
MAGNESIUM SERPL-MCNC: 1.8 MG/DL (ref 1.7–2.3)
MCH RBC QN AUTO: 29.9 PG (ref 26.5–33)
MCHC RBC AUTO-ENTMCNC: 33.2 G/DL (ref 31.5–36.5)
MCV RBC AUTO: 90 FL (ref 78–100)
MONOCYTES # BLD AUTO: 0.5 10E3/UL (ref 0–1.3)
MONOCYTES NFR BLD AUTO: 3 %
NEUTROPHILS # BLD AUTO: 11.7 10E3/UL (ref 1.6–8.3)
NEUTROPHILS NFR BLD AUTO: 83 %
NITRATE UR QL: NEGATIVE
NRBC # BLD AUTO: 0 10E3/UL
NRBC BLD AUTO-RTO: 0 /100
O2/TOTAL GAS SETTING VFR VENT: 21 %
OPIATES UR QL SCN: ABNORMAL
OXYHGB MFR BLDV: 39 % (ref 70–75)
PCO2 BLDV: 38 MM HG (ref 40–50)
PCP QUAL URINE (ROCHE): ABNORMAL
PH BLDV: 7.21 [PH] (ref 7.32–7.43)
PH UR STRIP: 5 [PH] (ref 5–9)
PHOSPHATE SERPL-MCNC: 1.2 MG/DL (ref 2.5–4.5)
PHOSPHATE SERPL-MCNC: 3.6 MG/DL (ref 2.5–4.5)
PLATELET # BLD AUTO: 399 10E3/UL (ref 150–450)
PO2 BLDV: 27 MM HG (ref 25–47)
POTASSIUM SERPL-SCNC: 4.2 MMOL/L (ref 3.4–5.3)
POTASSIUM SERPL-SCNC: 4.4 MMOL/L (ref 3.4–5.3)
PROT SERPL-MCNC: 6.7 G/DL (ref 6.4–8.3)
RBC # BLD AUTO: 4.31 10E6/UL (ref 4.4–5.9)
RBC URINE: <1 /HPF
SAO2 % BLDV: 39.9 % (ref 70–75)
SODIUM SERPL-SCNC: 135 MMOL/L (ref 135–145)
SODIUM SERPL-SCNC: 139 MMOL/L (ref 135–145)
SP GR UR STRIP: 1.02 (ref 1–1.03)
TROPONIN T SERPL HS-MCNC: 26 NG/L
TROPONIN T SERPL HS-MCNC: 30 NG/L
TROPONIN T SERPL HS-MCNC: 30 NG/L
UROBILINOGEN UR STRIP-MCNC: NORMAL MG/DL
WBC # BLD AUTO: 14.1 10E3/UL (ref 4–11)
WBC URINE: <1 /HPF

## 2025-05-26 PROCEDURE — 83605 ASSAY OF LACTIC ACID: CPT | Performed by: FAMILY MEDICINE

## 2025-05-26 PROCEDURE — 71045 X-RAY EXAM CHEST 1 VIEW: CPT

## 2025-05-26 PROCEDURE — 83036 HEMOGLOBIN GLYCOSYLATED A1C: CPT | Performed by: FAMILY MEDICINE

## 2025-05-26 PROCEDURE — 250N000011 HC RX IP 250 OP 636: Performed by: FAMILY MEDICINE

## 2025-05-26 PROCEDURE — 36415 COLL VENOUS BLD VENIPUNCTURE: CPT | Performed by: FAMILY MEDICINE

## 2025-05-26 PROCEDURE — 82010 KETONE BODYS QUAN: CPT | Performed by: FAMILY MEDICINE

## 2025-05-26 PROCEDURE — 93010 ELECTROCARDIOGRAM REPORT: CPT | Performed by: INTERNAL MEDICINE

## 2025-05-26 PROCEDURE — 258N000003 HC RX IP 258 OP 636: Performed by: FAMILY MEDICINE

## 2025-05-26 PROCEDURE — 84100 ASSAY OF PHOSPHORUS: CPT | Performed by: FAMILY MEDICINE

## 2025-05-26 PROCEDURE — 74174 CTA ABD&PLVS W/CONTRAST: CPT | Mod: 26 | Performed by: RADIOLOGY

## 2025-05-26 PROCEDURE — 83735 ASSAY OF MAGNESIUM: CPT | Performed by: FAMILY MEDICINE

## 2025-05-26 PROCEDURE — 82140 ASSAY OF AMMONIA: CPT | Performed by: FAMILY MEDICINE

## 2025-05-26 PROCEDURE — 93005 ELECTROCARDIOGRAM TRACING: CPT | Performed by: FAMILY MEDICINE

## 2025-05-26 PROCEDURE — 71275 CT ANGIOGRAPHY CHEST: CPT | Mod: 26 | Performed by: RADIOLOGY

## 2025-05-26 PROCEDURE — 80053 COMPREHEN METABOLIC PANEL: CPT | Performed by: FAMILY MEDICINE

## 2025-05-26 PROCEDURE — 80307 DRUG TEST PRSMV CHEM ANLYZR: CPT | Performed by: FAMILY MEDICINE

## 2025-05-26 PROCEDURE — 250N000013 HC RX MED GY IP 250 OP 250 PS 637: Performed by: FAMILY MEDICINE

## 2025-05-26 PROCEDURE — 96375 TX/PRO/DX INJ NEW DRUG ADDON: CPT | Performed by: FAMILY MEDICINE

## 2025-05-26 PROCEDURE — 96361 HYDRATE IV INFUSION ADD-ON: CPT | Performed by: FAMILY MEDICINE

## 2025-05-26 PROCEDURE — 250N000009 HC RX 250: Performed by: FAMILY MEDICINE

## 2025-05-26 PROCEDURE — 82077 ASSAY SPEC XCP UR&BREATH IA: CPT | Performed by: FAMILY MEDICINE

## 2025-05-26 PROCEDURE — 250N000013 HC RX MED GY IP 250 OP 250 PS 637: Performed by: INTERNAL MEDICINE

## 2025-05-26 PROCEDURE — 99291 CRITICAL CARE FIRST HOUR: CPT | Mod: 25 | Performed by: FAMILY MEDICINE

## 2025-05-26 PROCEDURE — 71275 CT ANGIOGRAPHY CHEST: CPT

## 2025-05-26 PROCEDURE — 71045 X-RAY EXAM CHEST 1 VIEW: CPT | Mod: 26 | Performed by: INTERNAL MEDICINE

## 2025-05-26 PROCEDURE — 85025 COMPLETE CBC W/AUTO DIFF WBC: CPT | Performed by: FAMILY MEDICINE

## 2025-05-26 PROCEDURE — 99291 CRITICAL CARE FIRST HOUR: CPT | Performed by: FAMILY MEDICINE

## 2025-05-26 PROCEDURE — 82962 GLUCOSE BLOOD TEST: CPT

## 2025-05-26 PROCEDURE — 200N000001 HC R&B ICU

## 2025-05-26 PROCEDURE — 96376 TX/PRO/DX INJ SAME DRUG ADON: CPT | Mod: XU | Performed by: FAMILY MEDICINE

## 2025-05-26 PROCEDURE — 82805 BLOOD GASES W/O2 SATURATION: CPT | Performed by: FAMILY MEDICINE

## 2025-05-26 PROCEDURE — 99292 CRITICAL CARE ADDL 30 MIN: CPT | Performed by: FAMILY MEDICINE

## 2025-05-26 PROCEDURE — 81003 URINALYSIS AUTO W/O SCOPE: CPT | Performed by: FAMILY MEDICINE

## 2025-05-26 PROCEDURE — 83690 ASSAY OF LIPASE: CPT | Performed by: FAMILY MEDICINE

## 2025-05-26 PROCEDURE — 84484 ASSAY OF TROPONIN QUANT: CPT | Performed by: FAMILY MEDICINE

## 2025-05-26 PROCEDURE — 96365 THER/PROPH/DIAG IV INF INIT: CPT | Mod: XU | Performed by: FAMILY MEDICINE

## 2025-05-26 RX ORDER — FOLIC ACID 1 MG/1
1 TABLET ORAL DAILY
Status: DISCONTINUED | OUTPATIENT
Start: 2025-05-29 | End: 2025-05-27 | Stop reason: HOSPADM

## 2025-05-26 RX ORDER — DIAZEPAM 5 MG/1
10 TABLET ORAL EVERY 30 MIN PRN
Status: DISCONTINUED | OUTPATIENT
Start: 2025-05-26 | End: 2025-05-27 | Stop reason: HOSPADM

## 2025-05-26 RX ORDER — KETOROLAC TROMETHAMINE 30 MG/ML
30 INJECTION, SOLUTION INTRAMUSCULAR; INTRAVENOUS EVERY 6 HOURS PRN
Status: DISCONTINUED | OUTPATIENT
Start: 2025-05-26 | End: 2025-05-27 | Stop reason: HOSPADM

## 2025-05-26 RX ORDER — SIMVASTATIN 10 MG
20 TABLET ORAL AT BEDTIME
Status: DISCONTINUED | OUTPATIENT
Start: 2025-05-26 | End: 2025-05-27 | Stop reason: HOSPADM

## 2025-05-26 RX ORDER — DEXTROSE MONOHYDRATE, SODIUM CHLORIDE, AND POTASSIUM CHLORIDE 50; 1.49; 4.5 G/1000ML; G/1000ML; G/1000ML
INJECTION, SOLUTION INTRAVENOUS CONTINUOUS
Status: DISCONTINUED | OUTPATIENT
Start: 2025-05-26 | End: 2025-05-27

## 2025-05-26 RX ORDER — DEXTROSE MONOHYDRATE 25 G/50ML
25-50 INJECTION, SOLUTION INTRAVENOUS
Status: DISCONTINUED | OUTPATIENT
Start: 2025-05-26 | End: 2025-05-26

## 2025-05-26 RX ORDER — POLYETHYLENE GLYCOL 3350 17 G/17G
17 POWDER, FOR SOLUTION ORAL DAILY
Status: DISCONTINUED | OUTPATIENT
Start: 2025-05-26 | End: 2025-05-27 | Stop reason: HOSPADM

## 2025-05-26 RX ORDER — AMOXICILLIN 250 MG
2 CAPSULE ORAL 2 TIMES DAILY PRN
Status: DISCONTINUED | OUTPATIENT
Start: 2025-05-26 | End: 2025-05-27 | Stop reason: HOSPADM

## 2025-05-26 RX ORDER — GABAPENTIN 300 MG/1
900 CAPSULE ORAL EVERY 8 HOURS
Status: DISCONTINUED | OUTPATIENT
Start: 2025-05-27 | End: 2025-05-26

## 2025-05-26 RX ORDER — HYDROMORPHONE HYDROCHLORIDE 1 MG/ML
0.5 INJECTION, SOLUTION INTRAMUSCULAR; INTRAVENOUS; SUBCUTANEOUS
Status: DISCONTINUED | OUTPATIENT
Start: 2025-05-26 | End: 2025-05-27 | Stop reason: HOSPADM

## 2025-05-26 RX ORDER — ACETAMINOPHEN 650 MG/1
650 SUPPOSITORY RECTAL EVERY 4 HOURS PRN
Status: DISCONTINUED | OUTPATIENT
Start: 2025-05-26 | End: 2025-05-27 | Stop reason: HOSPADM

## 2025-05-26 RX ORDER — ONDANSETRON 4 MG/1
4 TABLET, ORALLY DISINTEGRATING ORAL EVERY 6 HOURS PRN
Status: DISCONTINUED | OUTPATIENT
Start: 2025-05-26 | End: 2025-05-27 | Stop reason: HOSPADM

## 2025-05-26 RX ORDER — DEXTROSE MONOHYDRATE 100 MG/ML
INJECTION, SOLUTION INTRAVENOUS CONTINUOUS PRN
Status: DISCONTINUED | OUTPATIENT
Start: 2025-05-26 | End: 2025-05-26

## 2025-05-26 RX ORDER — DIAZEPAM 10 MG/2ML
5-10 INJECTION, SOLUTION INTRAMUSCULAR; INTRAVENOUS EVERY 30 MIN PRN
Status: DISCONTINUED | OUTPATIENT
Start: 2025-05-26 | End: 2025-05-27 | Stop reason: HOSPADM

## 2025-05-26 RX ORDER — MULTIPLE VITAMINS W/ MINERALS TAB 9MG-400MCG
1 TAB ORAL DAILY
Status: DISCONTINUED | OUTPATIENT
Start: 2025-05-26 | End: 2025-05-27 | Stop reason: HOSPADM

## 2025-05-26 RX ORDER — ZOLPIDEM TARTRATE 5 MG/1
10 TABLET ORAL
Status: DISCONTINUED | OUTPATIENT
Start: 2025-05-26 | End: 2025-05-27 | Stop reason: HOSPADM

## 2025-05-26 RX ORDER — NICOTINE POLACRILEX 4 MG
15-30 LOZENGE BUCCAL
Status: DISCONTINUED | OUTPATIENT
Start: 2025-05-26 | End: 2025-05-27 | Stop reason: HOSPADM

## 2025-05-26 RX ORDER — ONDANSETRON 2 MG/ML
4 INJECTION INTRAMUSCULAR; INTRAVENOUS EVERY 6 HOURS PRN
Status: DISCONTINUED | OUTPATIENT
Start: 2025-05-26 | End: 2025-05-27 | Stop reason: HOSPADM

## 2025-05-26 RX ORDER — NICOTINE POLACRILEX 4 MG
15-30 LOZENGE BUCCAL
Status: DISCONTINUED | OUTPATIENT
Start: 2025-05-26 | End: 2025-05-26

## 2025-05-26 RX ORDER — ONDANSETRON 2 MG/ML
4 INJECTION INTRAMUSCULAR; INTRAVENOUS
Status: DISCONTINUED | OUTPATIENT
Start: 2025-05-26 | End: 2025-05-26

## 2025-05-26 RX ORDER — LORAZEPAM 2 MG/ML
1 INJECTION INTRAMUSCULAR ONCE
Status: COMPLETED | OUTPATIENT
Start: 2025-05-26 | End: 2025-05-26

## 2025-05-26 RX ORDER — ACETAMINOPHEN 325 MG/1
650 TABLET ORAL EVERY 4 HOURS PRN
Status: DISCONTINUED | OUTPATIENT
Start: 2025-05-26 | End: 2025-05-27 | Stop reason: HOSPADM

## 2025-05-26 RX ORDER — GABAPENTIN 600 MG/1
1200 TABLET ORAL ONCE
Status: DISCONTINUED | OUTPATIENT
Start: 2025-05-26 | End: 2025-05-26

## 2025-05-26 RX ORDER — HYDROMORPHONE HYDROCHLORIDE 1 MG/ML
0.5 INJECTION, SOLUTION INTRAMUSCULAR; INTRAVENOUS; SUBCUTANEOUS EVERY 30 MIN PRN
Status: DISCONTINUED | OUTPATIENT
Start: 2025-05-26 | End: 2025-05-26

## 2025-05-26 RX ORDER — LEVOTHYROXINE SODIUM 112 UG/1
112 TABLET ORAL DAILY
Status: DISCONTINUED | OUTPATIENT
Start: 2025-05-26 | End: 2025-05-27 | Stop reason: HOSPADM

## 2025-05-26 RX ORDER — AMOXICILLIN 250 MG
1 CAPSULE ORAL 2 TIMES DAILY PRN
Status: DISCONTINUED | OUTPATIENT
Start: 2025-05-26 | End: 2025-05-27 | Stop reason: HOSPADM

## 2025-05-26 RX ORDER — DEXTROSE MONOHYDRATE 25 G/50ML
25-50 INJECTION, SOLUTION INTRAVENOUS
Status: DISCONTINUED | OUTPATIENT
Start: 2025-05-26 | End: 2025-05-27 | Stop reason: HOSPADM

## 2025-05-26 RX ORDER — NALOXONE HYDROCHLORIDE 0.4 MG/ML
0.4 INJECTION, SOLUTION INTRAMUSCULAR; INTRAVENOUS; SUBCUTANEOUS
Status: DISCONTINUED | OUTPATIENT
Start: 2025-05-26 | End: 2025-05-27 | Stop reason: HOSPADM

## 2025-05-26 RX ORDER — GABAPENTIN 300 MG/1
600 CAPSULE ORAL EVERY 8 HOURS
Status: DISCONTINUED | OUTPATIENT
Start: 2025-05-30 | End: 2025-05-26

## 2025-05-26 RX ORDER — NALOXONE HYDROCHLORIDE 0.4 MG/ML
0.2 INJECTION, SOLUTION INTRAMUSCULAR; INTRAVENOUS; SUBCUTANEOUS
Status: DISCONTINUED | OUTPATIENT
Start: 2025-05-26 | End: 2025-05-27 | Stop reason: HOSPADM

## 2025-05-26 RX ORDER — FLUMAZENIL 0.1 MG/ML
0.2 INJECTION, SOLUTION INTRAVENOUS
Status: DISCONTINUED | OUTPATIENT
Start: 2025-05-26 | End: 2025-05-27 | Stop reason: HOSPADM

## 2025-05-26 RX ORDER — HYDROMORPHONE HYDROCHLORIDE 1 MG/ML
0.3 INJECTION, SOLUTION INTRAMUSCULAR; INTRAVENOUS; SUBCUTANEOUS
Status: DISCONTINUED | OUTPATIENT
Start: 2025-05-26 | End: 2025-05-27 | Stop reason: HOSPADM

## 2025-05-26 RX ORDER — SODIUM CHLORIDE AND POTASSIUM CHLORIDE 150; 450 MG/100ML; MG/100ML
INJECTION, SOLUTION INTRAVENOUS CONTINUOUS
Status: DISCONTINUED | OUTPATIENT
Start: 2025-05-26 | End: 2025-05-27

## 2025-05-26 RX ORDER — CALCIUM CARBONATE 500 MG/1
1000 TABLET, CHEWABLE ORAL 4 TIMES DAILY PRN
Status: DISCONTINUED | OUTPATIENT
Start: 2025-05-26 | End: 2025-05-27 | Stop reason: HOSPADM

## 2025-05-26 RX ORDER — SUCRALFATE 1 G/1
1 TABLET ORAL ONCE
Status: COMPLETED | OUTPATIENT
Start: 2025-05-26 | End: 2025-05-26

## 2025-05-26 RX ORDER — GABAPENTIN 100 MG/1
100 CAPSULE ORAL EVERY 8 HOURS
Status: DISCONTINUED | OUTPATIENT
Start: 2025-06-03 | End: 2025-05-26

## 2025-05-26 RX ORDER — LIDOCAINE 40 MG/G
CREAM TOPICAL
Status: DISCONTINUED | OUTPATIENT
Start: 2025-05-26 | End: 2025-05-27 | Stop reason: HOSPADM

## 2025-05-26 RX ORDER — ASPIRIN 81 MG/1
81 TABLET ORAL DAILY
Status: DISCONTINUED | OUTPATIENT
Start: 2025-05-26 | End: 2025-05-27 | Stop reason: HOSPADM

## 2025-05-26 RX ORDER — GABAPENTIN 300 MG/1
300 CAPSULE ORAL EVERY 8 HOURS
Status: DISCONTINUED | OUTPATIENT
Start: 2025-06-01 | End: 2025-05-26

## 2025-05-26 RX ORDER — ENOXAPARIN SODIUM 100 MG/ML
40 INJECTION SUBCUTANEOUS EVERY 24 HOURS
Status: DISCONTINUED | OUTPATIENT
Start: 2025-05-26 | End: 2025-05-27

## 2025-05-26 RX ORDER — HALOPERIDOL 5 MG/ML
2.5-5 INJECTION INTRAMUSCULAR EVERY 4 HOURS PRN
Status: DISCONTINUED | OUTPATIENT
Start: 2025-05-26 | End: 2025-05-27 | Stop reason: HOSPADM

## 2025-05-26 RX ORDER — METOPROLOL TARTRATE 1 MG/ML
5 INJECTION, SOLUTION INTRAVENOUS EVERY 6 HOURS PRN
Status: DISCONTINUED | OUTPATIENT
Start: 2025-05-26 | End: 2025-05-27 | Stop reason: HOSPADM

## 2025-05-26 RX ORDER — IOPAMIDOL 755 MG/ML
100 INJECTION, SOLUTION INTRAVASCULAR ONCE
Status: COMPLETED | OUTPATIENT
Start: 2025-05-26 | End: 2025-05-26

## 2025-05-26 RX ADMIN — INSULIN HUMAN 5.5 UNITS/HR: 1 INJECTION, SOLUTION INTRAVENOUS at 16:28

## 2025-05-26 RX ADMIN — SODIUM CHLORIDE 1000 ML: 0.9 INJECTION, SOLUTION INTRAVENOUS at 16:05

## 2025-05-26 RX ADMIN — SIMVASTATIN 20 MG: 10 TABLET, FILM COATED ORAL at 22:02

## 2025-05-26 RX ADMIN — IOPAMIDOL 100 ML: 755 INJECTION, SOLUTION INTRAVENOUS at 15:14

## 2025-05-26 RX ADMIN — SODIUM CHLORIDE 1000 ML: 0.9 INJECTION, SOLUTION INTRAVENOUS at 14:03

## 2025-05-26 RX ADMIN — SUCRALFATE 1 G: 1 TABLET ORAL at 14:55

## 2025-05-26 RX ADMIN — Medication 1 TABLET: at 18:39

## 2025-05-26 RX ADMIN — Medication 2 PACKET: at 22:01

## 2025-05-26 RX ADMIN — POLYETHYLENE GLYCOL 3350 17 G: 17 POWDER, FOR SOLUTION ORAL at 18:39

## 2025-05-26 RX ADMIN — SODIUM CHLORIDE 90 ML: 9 INJECTION, SOLUTION INTRAVENOUS at 15:14

## 2025-05-26 RX ADMIN — HYDROMORPHONE HYDROCHLORIDE 0.5 MG: 1 INJECTION, SOLUTION INTRAMUSCULAR; INTRAVENOUS; SUBCUTANEOUS at 15:58

## 2025-05-26 RX ADMIN — ENOXAPARIN SODIUM 40 MG: 40 INJECTION SUBCUTANEOUS at 18:39

## 2025-05-26 RX ADMIN — ASPIRIN 81 MG: 81 TABLET, COATED ORAL at 18:39

## 2025-05-26 RX ADMIN — DEXTROSE, SODIUM CHLORIDE, AND POTASSIUM CHLORIDE 100 ML/HR: 5; .45; .15 INJECTION INTRAVENOUS at 20:00

## 2025-05-26 RX ADMIN — HYDROMORPHONE HYDROCHLORIDE 0.5 MG: 1 INJECTION, SOLUTION INTRAMUSCULAR; INTRAVENOUS; SUBCUTANEOUS at 12:12

## 2025-05-26 RX ADMIN — LORAZEPAM 1 MG: 2 INJECTION INTRAMUSCULAR; INTRAVENOUS at 16:04

## 2025-05-26 RX ADMIN — ONDANSETRON 4 MG: 2 INJECTION INTRAMUSCULAR; INTRAVENOUS at 16:05

## 2025-05-26 RX ADMIN — LEVOTHYROXINE SODIUM 112 MCG: 0.11 TABLET ORAL at 18:39

## 2025-05-26 RX ADMIN — SODIUM CHLORIDE 1000 ML: 0.9 INJECTION, SOLUTION INTRAVENOUS at 12:12

## 2025-05-26 ASSESSMENT — ACTIVITIES OF DAILY LIVING (ADL)
ADLS_ACUITY_SCORE: 41
ADLS_ACUITY_SCORE: 24
ADLS_ACUITY_SCORE: 41
ADLS_ACUITY_SCORE: 29
ADLS_ACUITY_SCORE: 41

## 2025-05-26 ASSESSMENT — COLUMBIA-SUICIDE SEVERITY RATING SCALE - C-SSRS
1. IN THE PAST MONTH, HAVE YOU WISHED YOU WERE DEAD OR WISHED YOU COULD GO TO SLEEP AND NOT WAKE UP?: NO
2. HAVE YOU ACTUALLY HAD ANY THOUGHTS OF KILLING YOURSELF IN THE PAST MONTH?: NO
6. HAVE YOU EVER DONE ANYTHING, STARTED TO DO ANYTHING, OR PREPARED TO DO ANYTHING TO END YOUR LIFE?: NO

## 2025-05-26 ASSESSMENT — ENCOUNTER SYMPTOMS: NERVOUS/ANXIOUS: 1

## 2025-05-26 NOTE — PROGRESS NOTES
Preventing Falls in the Hospital (02:42)  Your health professional recommends that you watch this short online health video.  Find out why you're at risk for falling in the hospital and how to prevent falls.   Purpose: Understand what increases a person's risk of falling in the hospital and how to prevent falls.  Goal: Understand what increases a person's risk of falling in the hospital and how to prevent falls.    Watch: Scan the QR code or visit the link to view video       [email or web address]  Current as of: July 17, 2023  Content Version: 14.2 2024 [facility] [employer].   Care instructions adapted under license by your healthcare professional. If you have questions about a medical condition or this instruction, always ask your healthcare professional. [employer] disclaims any warranty or liability for your use of this information.  Preventing Falls in the Hospital

## 2025-05-26 NOTE — ED PROVIDER NOTES
History     Chief Complaint   Patient presents with    Chest Pain     The history is provided by the patient, medical records and the EMS personnel.     Rick Contreras is a 55 year old male who has chest pain.  He was picked up at home after 1 1/2 days of chest pain.  The pain is worse with breathing. He had some N/V and feels anxious.     Per EMS:  [facility]BS 331, they gave a total of 100 mcg fentanyl, 2 mg Versed, 3 doses of SL nitroglycerin, and 324 mg aspirin.  He has been very anxious for them.     He drinks daily, usually a fifth of liquor, and has not had a drink since last night. No history of DT's but he usually keeps drinking.  He is not a smoker.     He has type 1 DM (insulin pump with Novolog), hypothyroidism (on LT), depression (on Wellbutrin XL).     Allergies:  Allergies   Allergen Reactions    Lisinopril Dizziness    Metformin Nausea and Other (See Comments)     Abdominal cramping       Problem List:    Patient Active Problem List    Diagnosis Date Noted    DKA (diabetic ketoacidosis) (H) 05/26/2025     Priority: Medium    Diabetic ketoacidosis without coma associated with diabetes mellitus due to underlying condition (H) 05/26/2025     Priority: Medium    Insulin pump in place 05/24/2024     Priority: Medium    Uses self-applied continuous glucose monitoring device 05/24/2024     Priority: Medium    CGM and insulin pump site infections 05/24/2024     Priority: Medium    Type 1 diabetes mellitus without complication (H) 12/26/2023     Priority: Medium    Alcohol use 02/03/2022     Priority: Medium    Gastroesophageal reflux disease with esophagitis, unspecified whether hemorrhage 02/03/2022     Priority: Medium    Acquired hypothyroidism 02/03/2022     Priority: Medium    Dizziness 03/19/2020     Priority: Medium    History of tobacco use disorder 03/12/2018     Priority: Medium    Rectal bleeding 10/25/2017     Priority: Medium    Back pain, chronic 12/29/2014     Priority: Medium    Herniated lumbar  intervertebral disc 2014     Priority: Medium    Constipation 2014     Priority: Medium     Overview:   Chronic          Past Medical History:    Past Medical History:   Diagnosis Date    Other constipation     Personal history of other medical treatment (CODE)        Past Surgical History:    Past Surgical History:   Procedure Laterality Date    EXTRACTION(S) DENTAL      No Comments Provided    HERNIORRHAPHY INGUINAL Bilateral 3/20/2018    Bilateral Inguinal Hernia Repair with ProlLite Mesh;  Surgeon: Immanuel Calzada MD    VASECTOMY             Family History:    Family History   Problem Relation Age of Onset    Diabetes Mother         Diabetes    Diabetes Father         Diabetes    Cancer Father         Cancer,Skin, throat    Anesthesia Reaction No family hx of     Clotting Disorder No family hx of     Cerebrovascular Disease No family hx of     Colon Cancer No family hx of     Prostate Cancer No family hx of        Social History:  Marital Status:   [4]  Social History     Tobacco Use    Smoking status: Former     Current packs/day: 0.00     Average packs/day: 0.5 packs/day for 25.0 years (12.5 ttl pk-yrs)     Types: Cigarettes     Start date: 1990     Quit date: 2015     Years since quittin.7    Smokeless tobacco: Never    Tobacco comments:     Quit smoking: has chantix   Vaping Use    Vaping status: Never Used   Substance Use Topics    Alcohol use: Yes     Alcohol/week: 10.0 - 15.0 standard drinks of alcohol     Types: 10 - 15 Standard drinks or equivalent per week     Comment: 2 drinks a day    Drug use: No        Medications:    aspirin 81 MG EC tablet  blood glucose (ACCU-CHEK GUIDE TEST) test strip  blood glucose (NO BRAND SPECIFIED) lancets standard  blood glucose (NO BRAND SPECIFIED) lancets standard  blood glucose monitoring (NO BRAND SPECIFIED) meter device kit  buPROPion (WELLBUTRIN XL) 150 MG 24 hr tablet  chlorhexidine (HIBICLENS) 4 % solution  clotrimazole  "(LOTRIMIN) 1 % external cream  Continuous Glucose Sensor (DEXCOM G6 SENSOR) MISC  Continuous Glucose Transmitter (DEXCOM G6 TRANSMITTER) MISC  glucose (BD GLUCOSE) 4 g chewable tablet  insulin aspart (NOVOLOG VIAL) 100 UNITS/ML vial  Insulin Infusion Pump Supplies (AUTOSOFT 30 INFUSION SET) MISC  Insulin Infusion Pump Supplies (T:SLIM X2 3ML CARTRIDGE) MISC  insulin syringe-needle U-100 (31G X 5/16\" 1 ML) 31G X 5/16\" 1 ML miscellaneous  IV Sets-Tubing (INFUSION SET 43\") MISC  levothyroxine (SYNTHROID/LEVOTHROID) 112 MCG tablet  LORazepam (ATIVAN) 0.5 MG tablet  meloxicam (MOBIC) 7.5 MG tablet  mupirocin (BACTROBAN) 2 % external ointment  polyethylene glycol (MIRALAX) 17 GM/Dose powder  simvastatin (ZOCOR) 20 MG tablet  zolpidem (AMBIEN) 10 MG tablet      Review of Systems   Cardiovascular:  Positive for chest pain.   Psychiatric/Behavioral:  The patient is nervous/anxious.    All other systems reviewed and are negative.      Physical Exam   BP: 115/54  Pulse: 92  Temp: (!) 96.3  F (35.7  C)  Resp: 26  Height: 172.7 cm (5' 8\")  Weight: 64.4 kg (142 lb)  SpO2: 97 %      Physical Exam  Vitals and nursing note reviewed.   Constitutional:       General: He is in acute distress.      Appearance: He is ill-appearing.   Cardiovascular:      Rate and Rhythm: Normal rate and regular rhythm.      Heart sounds: Normal heart sounds.      Comments: Pectis excavatum noted.  Pulmonary:      Effort: Pulmonary effort is normal.      Breath sounds: Normal breath sounds.   Abdominal:      General: Bowel sounds are normal.      Palpations: Abdomen is soft.      Tenderness: There is no abdominal tenderness.   Musculoskeletal:      Right lower leg: No tenderness. No edema.      Left lower leg: No tenderness. No edema.   Skin:     General: Skin is warm and dry.   Neurological:      Mental Status: He is alert.       EKG reviewed and interpreted by me.  EKG shows NSR, incomplete RBBB with left anterior fascicular block, rate 88, normal axis. "   Prior comparison: very similar to 2020     Results for orders placed or performed during the hospital encounter of 05/26/25 (from the past 24 hours)   [place] Draw    Narrative    The following orders were created for panel order [place] Draw.  Procedure                               Abnormality         Status                     ---------                               -----------         ------                     Extra Blue Top Tube[[id number]]                             Final result               Extra Red Top Tube[[id number]]                                                         Extra Green Top (Lithiu...[[id number]]                      Final result               Extra Purple Top Tube[[id number]]                           Final result               Extra Green Top (Lithiu...[[id number]]                      Final result                 Please view results for these tests on the individual orders.   Extra Blue Top Tube   Result Value Ref Range    Hold Specimen JIC    Extra Green Top (Lithium Heparin) Tube   Result Value Ref Range    Hold Specimen JIC    Extra Purple Top Tube   Result Value Ref Range    Hold Specimen JIC    Extra Green Top (Lithium Heparin) ON ICE   Result Value Ref Range    Hold Specimen JIC    XR Chest Port 1 View    Narrative    EXAM: XR CHEST PORT 1 VIEW  LOCATION: [facility]  DATE: 5/26/2025    INDICATION: chest pain  COMPARISON: 01/22/2014      Impression    IMPRESSION: Cardiac silhouette is borderline enlarged. Prominence of the pulmonary vasculature. No focal airspace opacity. No pleural effusion or discernible pneumothorax.   CBC with platelets differential    Narrative    The following orders were created for panel order CBC with platelets differential.  Procedure                               Abnormality         Status                     ---------                               -----------         ------                     CBC with platelets and  ...[[id number]]  Abnormal            Final result                 Please view results for these tests on the individual orders.   Comprehensive metabolic panel   Result Value Ref Range    Sodium 139 135 - 145 mmol/L    Potassium 4.4 3.4 - 5.3 mmol/L    Carbon Dioxide (CO2) 12 (L) 22 - 29 mmol/L    Anion Gap 28 (H) 7 - 15 mmol/L    Urea Nitrogen 21.7 (H) 6.0 - 20.0 mg/dL    Creatinine 1.15 0.67 - 1.17 mg/dL    GFR Estimate 75 >60 mL/min/1.73m2    Calcium 9.0 8.8 - 10.4 mg/dL    Chloride 99 98 - 107 mmol/L    Glucose 351 (H) 70 - 99 mg/dL    Alkaline Phosphatase 67 40 - 150 U/L    AST 30 0 - 45 U/L    ALT 29 0 - 70 U/L    Protein Total 6.7 6.4 - 8.3 g/dL    Albumin 4.1 3.5 - 5.2 g/dL    Bilirubin Total 1.1 <=1.2 mg/dL   Lipase   Result Value Ref Range    Lipase 15 13 - 60 U/L   Lactic acid whole blood with 1x repeat in 2 hr when >2   Result Value Ref Range    Lactic Acid, Initial 9.0 (HH) 0.7 - 2.0 mmol/L   Troponin T, High Sensitivity   Result Value Ref Range    Troponin T, High Sensitivity 26 (H) <=22 ng/L   Ammonia   Result Value Ref Range    Ammonia 26 16 - 60 umol/L   Magnesium   Result Value Ref Range    Magnesium 1.8 1.7 - 2.3 mg/dL   Ethanol Level Blood   Result Value Ref Range    Ethanol Level Blood <0.01 <=0.01 g/dL   CBC with platelets and differential   Result Value Ref Range    WBC Count 14.1 (H) 4.0 - 11.0 10e3/uL    RBC Count 4.31 (L) 4.40 - 5.90 10e6/uL    Hemoglobin 12.9 (L) 13.3 - 17.7 g/dL    Hematocrit 38.9 (L) 40.0 - 53.0 %    MCV 90 78 - 100 fL    MCH 29.9 26.5 - 33.0 pg    MCHC 33.2 31.5 - 36.5 g/dL    RDW 12.8 10.0 - 15.0 %    Platelet Count 399 150 - 450 10e3/uL    % Neutrophils 83 %    % Lymphocytes 12 %    % Monocytes 3 %    % Eosinophils 1 %    % Basophils 0 %    % Immature Granulocytes 1 %    NRBCs per 100 WBC 0 <1 /100    Absolute Neutrophils 11.7 (H) 1.6 - 8.3 10e3/uL    Absolute Lymphocytes 1.7 0.8 - 5.3 10e3/uL    Absolute Monocytes 0.5 0.0 - 1.3 10e3/uL    Absolute Eosinophils 0.1 0.0  - 0.7 10e3/uL    Absolute Basophils 0.0 0.0 - 0.2 10e3/uL    Absolute Immature Granulocytes 0.1 <=0.4 10e3/uL    Absolute NRBCs 0.0 10e3/uL   Extra Tube    Narrative    The following orders were created for panel order Extra Tube.  Procedure                               Abnormality         Status                     ---------                               -----------         ------                     Extra Blue Top Tube[[id number]]                             Final result               Extra Red Top Tube[[id number]]                              Final result                 Please view results for these tests on the individual orders.   Extra Blue Top Tube   Result Value Ref Range    Hold Specimen JIC    Extra Red Top Tube   Result Value Ref Range    Hold Specimen JIC    Hemoglobin A1c   Result Value Ref Range    Estimated Average Glucose 203 (H) <117 mg/dL    Hemoglobin A1C 8.7 (H) <5.7 %   Lactic acid whole blood   Result Value Ref Range    Lactic Acid 4.5 (HH) 0.7 - 2.0 mmol/L   Troponin T, High Sensitivity   Result Value Ref Range    Troponin T, High Sensitivity 30 (H) <=22 ng/L   Glucose by meter   Result Value Ref Range    GLUCOSE BY METER POCT 399 (H) 70 - 99 mg/dL   Urine Drug Screen    Narrative    The following orders were created for panel order Urine Drug Screen.  Procedure                               Abnormality         Status                     ---------                               -----------         ------                     Urine Drug Screen Panel[[id number]]     Abnormal            Final result                 Please view results for these tests on the individual orders.   UA with Microscopic reflex to Culture    Specimen: Urine, Midstream   Result Value Ref Range    Color Urine Yellow Colorless, Straw, Light Yellow, Yellow    Appearance Urine Clear Clear    Glucose Urine >1000 (A) Negative mg/dL    Bilirubin Urine Negative Negative    Ketones Urine 40 (A) Negative mg/dL    Specific Gravity  Urine 1.024 1.000 - 1.030    Blood Urine Negative Negative    pH Urine 5.0 5.0 - 9.0    Protein Albumin Urine Negative Negative mg/dL    Urobilinogen Urine Normal Normal mg/dL    Nitrite Urine Negative Negative    Leukocyte Esterase Urine Negative Negative    RBC Urine <1 <=2 /HPF    WBC Urine <1 <=5 /HPF    Hyaline Casts Urine 6 (H) <=2 /LPF    Narrative    Urine Culture not indicated   Urine Drug Screen Panel   Result Value Ref Range    Amphetamines Urine Screen Negative Screen Negative    Barbituates Urine Screen Negative Screen Negative    Benzodiazepine Urine Screen Positive (A) Screen Negative    Cannabinoids Urine Screen Negative Screen Negative    Cocaine Urine Screen Negative Screen Negative    Fentanyl Qual Urine Screen Positive (A) Screen Negative    Opiates Urine Screen Negative Screen Negative    PCP Urine Screen Negative Screen Negative   CTA Chest Abdomen Pelvis w Contrast    Narrative    EXAM: CTA CHEST ABDOMEN PELVIS W CONTRAST  LOCATION: [facility]  DATE: 5/26/2025    INDICATION: concern for aortic disease, chest pain  COMPARISON: None.  TECHNIQUE: CT angiogram chest abdomen pelvis during arterial phase of injection of IV contrast. 2D and 3D MIP reconstructions were performed by the CT technologist. Dose reduction techniques were used.   CONTRAST: Isovue 370, 100mL    FINDINGS:   CT ANGIOGRAM CHEST, ABDOMEN, AND PELVIS: Normal caliber aorta. No aortic dissection. Great vessels in the mediastinum are widely patent. No central pulmonary embolus. The mesenteric, renal, and iliac arteries are all widely patent.    LUNGS AND PLEURA: Moderate emphysema. Calcified granuloma right upper lobe.    MEDIASTINUM/AXILLAE: Normal.    CORONARY ARTERY CALCIFICATION: None.    HEPATOBILIARY: Normal.    PANCREAS: Normal.    SPLEEN: Normal.    ADRENAL GLANDS: Normal.    KIDNEYS/BLADDER: Normal.    BOWEL: Normal.    LYMPH NODES: Normal.    PELVIC ORGANS: Mild prostate gland  enlargement.    MUSCULOSKELETAL: Bilateral pars defects without significant anterolisthesis L5-S1. Degenerative changes in the spine. No acute fractures. Pectus excavatum.      Impression    IMPRESSION:  1.  No aortic dissection. No acute findings in the chest, abdomen, or pelvis.     Troponin T, High Sensitivity   Result Value Ref Range    Troponin T, High Sensitivity 30 (H) <=22 ng/L   Lactic Acid Whole Blood with 1X Repeat in 2 HR when >2   Result Value Ref Range    Lactic Acid, Initial 4.9 (HH) 0.7 - 2.0 mmol/L   Ketone Beta-Hydroxybutyrate Quantitative   Result Value Ref Range    Ketone (Beta-Hydroxybutyrate) Quantitative 4.01 (HH) <=0.30 mmol/L   Glucose by meter   Result Value Ref Range    GLUCOSE BY METER POCT 439 (H) 70 - 99 mg/dL   Blood gas venous   Result Value Ref Range    pH Venous 7.21 (L) 7.32 - 7.43    pCO2 Venous 38 (L) 40 - 50 mm Hg    pO2 Venous 27 25 - 47 mm Hg    Bicarbonate Venous 15 (L) 21 - 28 mmol/L    Base Excess/Deficit Venous -12.1 (L) -3.0 - 3.0 mmol/L    FIO2 21     Oxyhemoglobin Venous 39 (L) 70 - 75 %    O2 Sat, Venous 39.9 (L) 70.0 - 75.0 %    Narrative    In healthy individuals, oxyhemoglobin (O2Hb) and oxygen saturation (SO2) are approximately equal. In the presence of dyshemoglobins, oxyhemoglobin can be considerably lower than oxygen saturation.   Glucose by meter   Result Value Ref Range    GLUCOSE BY METER POCT 384 (H) 70 - 99 mg/dL   Glucose by meter   Result Value Ref Range    GLUCOSE BY METER POCT 363 (H) 70 - 99 mg/dL   Lactic acid whole blood   Result Value Ref Range    Lactic Acid 2.7 (H) 0.7 - 2.0 mmol/L       Medications   HYDROmorphone (PF) (DILAUDID) injection 0.5 mg (0.5 mg Intravenous $Given 5/26/25 2129)   sodium chloride 0.9 % bag for CT scan flush (90 mLs Intravenous $Given 5/26/25 5144)   ondansetron (ZOFRAN) injection 4 mg (4 mg Intravenous $Given 5/26/25 1605)   dextrose 10% infusion (has no administration in time range)   insulin regular (MYXREDLIN) 1  unit/mL infusion (11 Units/hr Intravenous Rate/Dose Change 5/26/25 3141)   glucose gel 15-30 g (has no administration in time range)     Or   dextrose 50 % injection 25-50 mL (has no administration in time range)     Or   glucagon injection 1 mg (has no administration in time range)   sodium chloride 0.9% BOLUS 1,000 mL (0 mLs Intravenous Stopped 5/26/25 1315)   sodium chloride 0.9% BOLUS 1,000 mL (0 mLs Intravenous Stopped 5/26/25 1502)   sucralfate (CARAFATE) tablet 1 g (1 g Oral $Given 5/26/25 1455)   iopamidol (ISOVUE-370) solution 100 mL (100 mLs Intravenous $Given 5/26/25 1514)   sodium chloride 0.9% BOLUS 1,000 mL (1,000 mLs Intravenous $New Bag 5/26/25 1605)   LORazepam (ATIVAN) injection 1 mg (1 mg Intravenous $Given 5/26/25 1604)       Assessments & Plan (with Medical Decision Making)  Rick Contreras is a 55 year old male who has chest pain.  He was picked up at home after 1 1/2 days of chest pain.  The pain is worse with breathing. He had some N/V and feels anxious.   Per EMS:  FSBS 331, they gave a total of 100 mcg fentanyl, 2 mg Versed, 3 doses of SL nitroglycerin, and 324 mg aspirin.  He has been very anxious for them.   He drinks daily, usually a fifth of liquor, and has not had a drink since last night. No history of DT's but he usually keeps drinking.  He is not a smoker.   He has type 1 DM (insulin pump with Novolog), hypothyroidism (on LT), depression (on Wellbutrin XL).   VS in the ED  Patient Vitals for the past 24 hrs:   BP Temp Temp src Pulse Resp SpO2 Height Weight   05/26/25 1545 130/83 -- -- 102 12 100 % -- --   05/26/25 1530 108/56 -- -- 104 25 97 % -- --   05/26/25 1500 119/61 -- -- 98 24 98 % -- --   05/26/25 1445 113/60 -- -- 101 18 99 % -- --   05/26/25 1430 116/53 -- -- 102 (!) 33 99 % -- --   05/26/25 1415 128/46 -- -- 96 (!) 51 97 % -- --   05/26/25 1400 109/55 -- -- 96 17 96 % -- --   05/26/25 1345 103/56 -- -- 95 15 97 % -- --   05/26/25 1330 109/53 -- -- 92 22 98 % -- --  "  05/26/25 1315 110/54 -- -- 96 13 98 % -- --   05/26/25 1300 110/53 -- -- 93 20 98 % -- --   05/26/25 1245 109/56 -- -- 93 23 97 % -- --   05/26/25 1230 107/55 -- -- 92 25 96 % -- --   05/26/25 1218 -- (!) 96.3  F (35.7  C) Tympanic -- -- -- -- --   05/26/25 1215 (!) 57/33 -- -- 96 26 98 % -- --   05/26/25 1200 105/76 -- -- 97 26 99 % -- --   05/26/25 1150 115/54 -- -- 92 (!) 42 98 % -- --   05/26/25 1149 -- -- -- 92 26 97 % 1.727 m (5' 8\") 64.4 kg (142 lb)     On arrival he looks very uncomfortable. POCUS shows good sliding sign bilaterally. Heart and lung sounds normal, pectus excavatum noted.   We gave IVF bolus x 2, Dilaudid, insulin drip, lorazepam  EKG stable  Labs show CBC with WBC 14,100, hgb 12.9 (stable), CMP with anion gap 28, glucose 351, lipase normal, troponin 26, ethanol zero, Mg normal, ammonia normal, ketone BHB 4.0, VBG with pH 7.21, pCO2 38, pO2 27, bicarb 15 , lactic acid 9.0, UA negative for UTI, UDS positive for BZD and fentanyl (got both from EMS).   Chest xray stable: Cardiac silhouette is borderline enlarged. Prominence of the pulmonary vasculature. No focal airspace opacity. No pleural effusion or discernible pneumothorax.   CTA CAP shows no aortic dissection.   2:27 PM  Repeat lactic acid 4.5, and troponin 30 (up from 26). He has less chest pain.   2:37 PM  His FSBS is 399 and he gave himself 4 U regular insulin with his pump.  2:59 PM  His pain is better, no chest pain, but now he says his pain is more by his abdomen.  We will get a CTA CAP.   4:01 PM    Repeat lactic acid is 4.9 (went up) and he is vomiting. We will give a 3rd liter of NS, some Ativan and some Zofran.   4:10 PM    Repeat troponin 30, lactic acid 2.7, glucose 363.  5:45 PM  Dr Patterson is admitting this patient.     Critical Care Addendum  My initial assessment, based on my review of nursing observations, review of vital signs, focused history, physical exam, review of cardiac rhythm monitor, established a high suspicion " that Rick Conrteras has DKA and alcohol abuse, which requires immediate intervention, and therefore he is critically ill.      After the initial assessment, the care team initiated multiple lab tests, initiated labs, fluids, medications. Due to the critical nature of this patient, I reassessed vital signs, mental status, and respiratory status multiple times prior to disposition.       Time also spent performing documentation, discussion with family to obtain medical information for decision making, reviewing test results, and coordination of care.     Critical care time (excluding teaching time and procedures): 120 minutes.        I have reviewed the nursing notes.    I have reviewed the findings, diagnosis, plan and need for follow up with the patient.  Medical Decision Making  The patient's presentation was of high complexity (a chronic illness severe exacerbation, progression, or side effect of treatment).    The patient's evaluation involved:  an assessment requiring an independent historian (see separate area of note for details)  review of external note(s) from 2 sources (see separate area of note for details)  review of 2 test result(s) ordered prior to this encounter (see separate area of note for details)  ordering and/or review of 3+ test(s) in this encounter (see separate area of note for details)    The patient's management necessitated moderate risk (prescription drug management including medications given in the ED), moderate risk (IV contrast administration), high risk (a parenteral controlled substance), high risk (drug therapy requiring intensive monitoring (see separate area of note for details)), and high risk (a decision regarding hospitalization).    Final diagnoses:   Diabetic ketoacidosis without coma associated with diabetes mellitus due to underlying condition (H)   Type 1 diabetes mellitus without complication (H)   Alcohol use       5/26/2025   [facility]        Puneet Iqbal MD  05/26/25 1747       Puneet Iqbal MD  05/26/25 1742

## 2025-05-26 NOTE — H&P
[facility]    History and Physical - Hospitalist Service       Date of Admission:  5/26/2025    Assessment & Plan      Rick Contreras is a 55 year old male with type 1 diabetes on insulin pump presenting for chest discomfort for the past day.  Also has some nausea and abdominal pain. He improved after receiving fentanyl, nitroglycerin, aspirin and midazolam.  Workup in the ED reveals metabolic acidosis with elevated lactic acid and elevated ketones.  He believes his insulin pump was not working.  He does not recall a past history of DKA.  Feeling better after receiving IVF bolus and starting on insulin drip.    Principal Problem:    Diabetic ketoacidosis without coma associated with diabetes mellitus due to underlying condition (H)  Anion gap, ketones 4.01, VBG with pH 7.2  Appropriately fluid resuscitated in the ED  Started on insulin drip  Placed in ICU on DKA protocol  Serial monitoring of ketones, phosphorus, BMP  Order replacement protocols for phosphorus, magnesium, potassium    Active Problems:     Anion gap metabolic acidosis  From both lactic acidosis and ketoacidosis  Initial anion gap 28  Lactic acid 9, improved to 2.7 after 3L IVF      Acquired hypothyroidism  Continue levothyroxine      Uncomplicated alcohol dependence (H)  Drinks a fair amount of liquor daily.  Last drink day prior to admission.  No history of withdrawal after avoiding alcohol for 2 days.  Placed on CIWA protocol with diazepam  Vitamins ordered  No indication for clonidine or gabapentin as he has no current withdrawal symptoms    Spent 46 minutes on critical care of this patient in DKA on insulin drip requiring ICU        Diet: High Consistent Carb (75 g CHO per Meal) Diet    DVT Prophylaxis: Enoxaparin (Lovenox) SQ  Marie Catheter: Not present  Lines: None     Cardiac Monitoring: ACTIVE order. Indication: ICU  Code Status: Full Code      Clinically Significant Risk Factors Present on Admission              #  Anion Gap Metabolic Acidosis: Highest Anion Gap = 28 mmol/L in last 2 days, will monitor and treat as appropriate    # Drug Induced Platelet Defect: home medication list includes an antiplatelet medication                        Disposition Plan     Medically Ready for Discharge: Anticipated in 2-4 Days           Jermaine Patterson MD  Hospitalist Service  [facility]  Securely message with [employer] (more info)  Text page via [facility] Paging/Directory     ______________________________________________________________________    Chief Complaint   Chest pain    History is obtained from the patient and ED provider    History of Present Illness   Rick Contreras is a 55 year old male with type 1 diabetes on insulin pump presenting for chest discomfort for the past day.  Also has some nausea and abdominal pain. He improved after receiving fentanyl, nitroglycerin, aspirin and midazolam.  Workup in the ED reveals metabolic acidosis with elevated lactic acid and elevated ketones.  He believes his insulin pump was not working.  He does not recall a past history of DKA.  Feeling better after receiving IVF bolus and starting on insulin drip.      Past Medical History    Past Medical History:   Diagnosis Date    Other constipation     No Comments Provided    Personal history of other medical treatment (CODE)     No Comments Provided       Past Surgical History   Past Surgical History:   Procedure Laterality Date    EXTRACTION(S) DENTAL      No Comments Provided    HERNIORRHAPHY INGUINAL Bilateral 3/20/2018    Bilateral Inguinal Hernia Repair with ProlLite Mesh;  Surgeon: Immanuel Calzada MD    VASECTOMY      1992       Prior to Admission Medications   Prior to Admission Medications   Prescriptions Last Dose Informant Patient Reported? Taking?   Continuous Glucose Sensor (DEXCOM G6 SENSOR) MISC   No No   Sig: CHANGE EVERY 10 DAYS   Continuous Glucose Transmitter (DEXCOM G6 TRANSMITTER) MISC   No No   Sig: CHANGE  "EVERY THREE MONTHS   IV Sets-Tubing (INFUSION SET 43\") MISC   No No   Si\" 13 mm auto soft 30 .Change every 2-3 days   Insulin Infusion Pump Supplies (AUTOSOFT 30 INFUSION SET) MISC   No No   Sig: CHANGE EVERY 3 DAYS   Insulin Infusion Pump Supplies (T:SLIM X2 3ML CARTRIDGE) MISC   No No   Sig: CHANGE EVERY 3 DAYS   LORazepam (ATIVAN) 0.5 MG tablet   Yes No   Sig: TAKE 1 TABLET BY MOUTH 3 TIMES A WEEK AS NEEDED FOR ANXIETY/PANIC ATTACKS   aspirin 81 MG EC tablet   No No   Sig: Take 1 tablet (81 mg) by mouth daily with food   blood glucose (ACCU-CHEK GUIDE TEST) test strip   No No   Sig: USE   TO CHECK GLUCOSE TWICE DAILY   blood glucose (NO BRAND SPECIFIED) lancets standard   No No   Sig: Dispense item covered by insurance. Test blood sugar 2 times daily.   blood glucose (NO BRAND SPECIFIED) lancets standard   No No   Sig: Dispense item covered by insurance. Test blood sugar 2 times daily.   blood glucose monitoring (NO BRAND SPECIFIED) meter device kit   No No   Sig: Dispense option covered by insurance. Test blood sugar 2 times daily.   buPROPion (WELLBUTRIN XL) 150 MG 24 hr tablet   Yes No   Sig: Take 1 tablet by mouth daily at 2 pm   chlorhexidine (HIBICLENS) 4 % solution   No No   Sig: Apply topically once a week   clotrimazole (LOTRIMIN) 1 % external cream   No No   Sig: Apply topically 2 times daily   glucose (BD GLUCOSE) 4 g chewable tablet   No No   Sig: Take 1 tablet by mouth every hour as needed for low blood sugar   insulin aspart (NOVOLOG VIAL) 100 UNITS/ML vial   No No   Sig: USE PER INSULIN PUMP: BASAL: 0.4 UNIT/HOUR, BOLUS: ICR 1:15 GRAMS, ISF 1:100 MG/DL. MAX DAILY DOSE 200 UNITS   insulin syringe-needle U-100 (31G X 5/16\" 1 ML) 31G X 5/16\" 1 ML miscellaneous   No No   Sig: Use 4 syringes daily or as directed.   levothyroxine (SYNTHROID/LEVOTHROID) 112 MCG tablet   No No   Sig: Take 1 tablet by mouth once daily   meloxicam (MOBIC) 7.5 MG tablet   No No   Sig: Take 1-2 tablets (7.5-15 mg) by " mouth nightly as needed for pain.   mupirocin (BACTROBAN) 2 % external ointment   No No   Sig: Apply topically 3 times daily as needed (wound)   polyethylene glycol (MIRALAX) 17 GM/Dose powder   No No   Sig: Take 17 g (1 Capful) by mouth daily   simvastatin (ZOCOR) 20 MG tablet   No No   Sig: Take 1 tablet (20 mg) by mouth at bedtime.   zolpidem (AMBIEN) 10 MG tablet   Yes No   Sig: Take 10 mg by mouth daily      Facility-Administered Medications: None        Review of Systems    No fever or cough.  No significant dyspnea.  No diarrhea.     Physical Exam   Vital Signs: Temp: 98.3  F (36.8  C) Temp src: Oral BP: 128/66 Pulse: 98   Resp: 18 SpO2: 98 % O2 Device: None (Room air)    Weight: 143 lbs 15.37 oz    General Appearance: Pleasant, alert, appropriate appearance for age. No acute distress  Eyes: EOMI, PERRL, no conjunctival injection  OroPharynx Exam: Normal pharynx.  Neck Exam: Supple, no masses or nodes.  Chest/Respiratory Exam: Normal chest wall and respirations. Clear to auscultation.  Cardiovascular Exam: Regular rate and rhythm. S1, S2, no murmur, click, gallop, or rubs.  Gastrointestinal Exam: Soft, nontender, no abnormal masses or organomegaly.  Extremities: 2+ pedal pulses.  No lower extremity edema.  Neuro Exam: Alert, oriented x 3. CN II-XI intact. Strength symmetric upper and lower extremities  Psychiatric: Normal affect and mentation      Medical Decision Making             Data     I have personally reviewed the following data over the past 24 hrs:    14.1 (H)  \   12.9 (L)   / 399     139 99 21.7 (H) /  315 (H)   4.4 12 (L) 1.15 \     ALT: 29 AST: 30 AP: 67 TBILI: 1.1   ALB: 4.1 TOT PROTEIN: 6.7 LIPASE: 15     Trop: 30 (H) BNP: N/A     TSH: N/A T4: N/A A1C: 8.7 (H)     Procal: N/A CRP: N/A Lactic Acid: 2.7 (H)         EKG shows no concerning changes compared to previous from March 2020.  There is a new left anterior fascicular block, but no concerning ST changes    Imaging results reviewed over the  past 24 hrs:   Recent Results (from the past 24 hours)   XR Chest Port 1 View    Narrative    EXAM: XR CHEST PORT 1 VIEW  LOCATION: [facility]  DATE: 5/26/2025    INDICATION: chest pain  COMPARISON: 01/22/2014      Impression    IMPRESSION: Cardiac silhouette is borderline enlarged. Prominence of the pulmonary vasculature. No focal airspace opacity. No pleural effusion or discernible pneumothorax.   CTA Chest Abdomen Pelvis w Contrast    Narrative    EXAM: CTA CHEST ABDOMEN PELVIS W CONTRAST  LOCATION: [facility]  DATE: 5/26/2025    INDICATION: concern for aortic disease, chest pain  COMPARISON: None.  TECHNIQUE: CT angiogram chest abdomen pelvis during arterial phase of injection of IV contrast. 2D and 3D MIP reconstructions were performed by the CT technologist. Dose reduction techniques were used.   CONTRAST: Isovue 370, 100mL    FINDINGS:   CT ANGIOGRAM CHEST, ABDOMEN, AND PELVIS: Normal caliber aorta. No aortic dissection. Great vessels in the mediastinum are widely patent. No central pulmonary embolus. The mesenteric, renal, and iliac arteries are all widely patent.    LUNGS AND PLEURA: Moderate emphysema. Calcified granuloma right upper lobe.    MEDIASTINUM/AXILLAE: Normal.    CORONARY ARTERY CALCIFICATION: None.    HEPATOBILIARY: Normal.    PANCREAS: Normal.    SPLEEN: Normal.    ADRENAL GLANDS: Normal.    KIDNEYS/BLADDER: Normal.    BOWEL: Normal.    LYMPH NODES: Normal.    PELVIC ORGANS: Mild prostate gland enlargement.    MUSCULOSKELETAL: Bilateral pars defects without significant anterolisthesis L5-S1. Degenerative changes in the spine. No acute fractures. Pectus excavatum.      Impression    IMPRESSION:  1.  No aortic dissection. No acute findings in the chest, abdomen, or pelvis.

## 2025-05-26 NOTE — PROGRESS NOTES
" [facility]G ADMISSION NOTE    Patient admitted to room 914 at approximately 1810 via cart from emergency room. Patient was accompanied by nurse.     Verbal SBAR report received from RUTH Sanders prior to patient arrival.     Patient ambulated to bed with stand-by assist. Patient alert and oriented X 3. The patient is not having any pain.  . Admission vital signs: Blood pressure 128/66, pulse 98, temperature 98.3  F (36.8  C), temperature source Oral, resp. rate 18, height 1.727 m (5' 8\"), weight 65.3 kg (143 lb 15.4 oz), SpO2 98%. Patient was oriented to plan of care, call light, bed controls, tv, telephone, bathroom, and visiting hours.     Risk Assessment    The following safety risks were identified during admission: fall. Yellow risk band applied: YES.     Skin Initial Assessment    This writer admitted this patient and completed a full skin assessment and Geovany score in the Adult PCS flowsheet.   Photo documentation of skin problem and/or wound competed via [employer] application (located under Media):  N/A    Appropriate interventions initiated as needed.     Secondary skin check completed by RUTH Erickson.         Education    Patient has a [place] to Observation order: No  Observation education completed and documented: N/A      Admission/Transfer from: ER  2 RN skin assessment completed. Yes  Significant findings include: Scattered scabs/bruises  WO Nurse Consult Ordered? No       Romy Rome RN      "

## 2025-05-26 NOTE — ED TRIAGE NOTES
Pt here with EMS into [facility], pt reports that he developed chest pain about 1 hour ago, pt received fentanyl, nitroglycerin and ASA and versed per EMS, pt was very anxious and agitated on scene, pt drinks daily, last drink was yesterday, pt reports pain is still a 10/10, VSS    Triage Assessment (Adult)       Row Name 05/26/25 1148          Triage Assessment    Airway WDL WDL        Respiratory WDL    Respiratory WDL WDL        Skin Circulation/Temperature WDL    Skin Circulation/Temperature WDL WDL        Cardiac WDL    Cardiac WDL WDL        Peripheral/Neurovascular WDL    Peripheral Neurovascular WDL WDL        Cognitive/Neuro/Behavioral WDL    Cognitive/Neuro/Behavioral WDL WDL

## 2025-05-27 VITALS
TEMPERATURE: 98.1 F | RESPIRATION RATE: 15 BRPM | OXYGEN SATURATION: 99 % | DIASTOLIC BLOOD PRESSURE: 57 MMHG | WEIGHT: 143.96 LBS | BODY MASS INDEX: 21.82 KG/M2 | SYSTOLIC BLOOD PRESSURE: 94 MMHG | HEIGHT: 68 IN | HEART RATE: 74 BPM

## 2025-05-27 LAB
ANION GAP SERPL CALCULATED.3IONS-SCNC: 10 MMOL/L (ref 7–15)
ANION GAP SERPL CALCULATED.3IONS-SCNC: 9 MMOL/L (ref 7–15)
ATRIAL RATE - MUSE: 88 BPM
B-OH-BUTYR SERPL-SCNC: <0.18 MMOL/L
B-OH-BUTYR SERPL-SCNC: <0.18 MMOL/L
BUN SERPL-MCNC: 15.9 MG/DL (ref 6–20)
BUN SERPL-MCNC: 19.1 MG/DL (ref 6–20)
CALCIUM SERPL-MCNC: 8.4 MG/DL (ref 8.8–10.4)
CALCIUM SERPL-MCNC: 8.5 MG/DL (ref 8.8–10.4)
CHLORIDE SERPL-SCNC: 107 MMOL/L (ref 98–107)
CHLORIDE SERPL-SCNC: 110 MMOL/L (ref 98–107)
CREAT SERPL-MCNC: 1.06 MG/DL (ref 0.67–1.17)
CREAT SERPL-MCNC: 1.08 MG/DL (ref 0.67–1.17)
DIASTOLIC BLOOD PRESSURE - MUSE: NORMAL MMHG
EGFRCR SERPLBLD CKD-EPI 2021: 81 ML/MIN/1.73M2
EGFRCR SERPLBLD CKD-EPI 2021: 83 ML/MIN/1.73M2
GLUCOSE BLDC GLUCOMTR-MCNC: 117 MG/DL (ref 70–99)
GLUCOSE BLDC GLUCOMTR-MCNC: 125 MG/DL (ref 70–99)
GLUCOSE BLDC GLUCOMTR-MCNC: 139 MG/DL (ref 70–99)
GLUCOSE BLDC GLUCOMTR-MCNC: 150 MG/DL (ref 70–99)
GLUCOSE BLDC GLUCOMTR-MCNC: 242 MG/DL (ref 70–99)
GLUCOSE BLDC GLUCOMTR-MCNC: 85 MG/DL (ref 70–99)
GLUCOSE SERPL-MCNC: 138 MG/DL (ref 70–99)
GLUCOSE SERPL-MCNC: 80 MG/DL (ref 70–99)
HCO3 SERPL-SCNC: 21 MMOL/L (ref 22–29)
HCO3 SERPL-SCNC: 21 MMOL/L (ref 22–29)
HOLD SPECIMEN: NORMAL
INTERPRETATION ECG - MUSE: NORMAL
LACTATE SERPL-SCNC: 0.8 MMOL/L (ref 0.7–2)
MAGNESIUM SERPL-MCNC: 1.9 MG/DL (ref 1.7–2.3)
P AXIS - MUSE: 70 DEGREES
PHOSPHATE SERPL-MCNC: 3.1 MG/DL (ref 2.5–4.5)
PHOSPHATE SERPL-MCNC: 4 MG/DL (ref 2.5–4.5)
POTASSIUM SERPL-SCNC: 4 MMOL/L (ref 3.4–5.3)
POTASSIUM SERPL-SCNC: 4.4 MMOL/L (ref 3.4–5.3)
PR INTERVAL - MUSE: 158 MS
QRS DURATION - MUSE: 96 MS
QT - MUSE: 368 MS
QTC - MUSE: 445 MS
R AXIS - MUSE: -53 DEGREES
SODIUM SERPL-SCNC: 137 MMOL/L (ref 135–145)
SODIUM SERPL-SCNC: 141 MMOL/L (ref 135–145)
SYSTOLIC BLOOD PRESSURE - MUSE: NORMAL MMHG
T AXIS - MUSE: 57 DEGREES
VENTRICULAR RATE- MUSE: 88 BPM

## 2025-05-27 PROCEDURE — 83605 ASSAY OF LACTIC ACID: CPT | Performed by: FAMILY MEDICINE

## 2025-05-27 PROCEDURE — 36415 COLL VENOUS BLD VENIPUNCTURE: CPT | Performed by: FAMILY MEDICINE

## 2025-05-27 PROCEDURE — 83735 ASSAY OF MAGNESIUM: CPT | Performed by: FAMILY MEDICINE

## 2025-05-27 PROCEDURE — 99239 HOSP IP/OBS DSCHRG MGMT >30: CPT | Performed by: FAMILY MEDICINE

## 2025-05-27 PROCEDURE — 84100 ASSAY OF PHOSPHORUS: CPT | Performed by: FAMILY MEDICINE

## 2025-05-27 PROCEDURE — 250N000013 HC RX MED GY IP 250 OP 250 PS 637: Performed by: INTERNAL MEDICINE

## 2025-05-27 PROCEDURE — 82010 KETONE BODYS QUAN: CPT | Performed by: FAMILY MEDICINE

## 2025-05-27 PROCEDURE — 258N000003 HC RX IP 258 OP 636: Performed by: FAMILY MEDICINE

## 2025-05-27 PROCEDURE — 250N000013 HC RX MED GY IP 250 OP 250 PS 637: Performed by: FAMILY MEDICINE

## 2025-05-27 PROCEDURE — 80048 BASIC METABOLIC PNL TOTAL CA: CPT | Performed by: FAMILY MEDICINE

## 2025-05-27 RX ADMIN — ASPIRIN 81 MG: 81 TABLET, COATED ORAL at 09:55

## 2025-05-27 RX ADMIN — Medication 1 TABLET: at 09:55

## 2025-05-27 RX ADMIN — LEVOTHYROXINE SODIUM 112 MCG: 0.11 TABLET ORAL at 09:55

## 2025-05-27 RX ADMIN — Medication 2 PACKET: at 01:21

## 2025-05-27 RX ADMIN — DEXTROSE, SODIUM CHLORIDE, AND POTASSIUM CHLORIDE: 5; .45; .15 INJECTION INTRAVENOUS at 06:20

## 2025-05-27 RX ADMIN — Medication 2 PACKET: at 05:06

## 2025-05-27 ASSESSMENT — ACTIVITIES OF DAILY LIVING (ADL)
ADLS_ACUITY_SCORE: 29

## 2025-05-27 NOTE — PROGRESS NOTES
Health Maintenance Due   Topic Date Due   • Hepatitis A Vaccine (1 of 2 - 2-dose series) 09/05/2019   • Pneumococcal Vaccine 0-64 (4 of 4) 09/05/2019   • Influenza Vaccine (1) 09/01/2019       Patient is due for topics as listed above but is not proceeding with Immunization(s) Hep A and Pneumococcal at this time. Will update at next well child.  Per Dr Mendoza influenza discussed however too early to ensure coverage all season.  Will update in October.           Pt home pump BG value with 20 points of hospital glucometer. . All discharge instructions reviewed with pt.   Romy Rome RN on 5/27/2025 at 10:57 AM

## 2025-05-27 NOTE — PLAN OF CARE
NS[facility] DISCHARGE NOTE    Patient discharged to home at 11:00 AM via ambulation. Accompanied by mother and staff. Discharge instructions reviewed with patient, opportunity offered to ask questions. Prescriptions - None ordered for discharge. All belongings sent with patient.    Romy Rome RN

## 2025-05-27 NOTE — DISCHARGE SUMMARY
[facility]  Hospitalist Discharge Summary      Date of Admission:  5/26/2025  Date of Discharge:  5/27/2025  Discharging Provider: Jermaine Patterson MD  Discharge Service: Hospitalist Service    Discharge Diagnoses   Principal Problem:    Diabetic ketoacidosis without coma associated with diabetes mellitus due to underlying condition (H)  Active Problems:    Acquired hypothyroidism    Uncomplicated alcohol dependence (H)        Clinically Significant Risk Factors          Follow-ups Needed After Discharge   Follow-up Appointments       Follow-up and recommended labs and tests       Follow up with primary care provider, Dr Dubois, within 7 days for hospital follow- up.                Unresulted Labs Ordered in the Past 30 Days of this Admission       No orders found for last 31 day(s).        These results will be followed up by NA    Discharge Disposition   Discharged to home  Condition at discharge: Stable    Hospital Course      Rick Contreras is a 55 year old male with type 1 diabetes on insulin pump presenting for chest discomfort for the past day.  Also has some nausea and abdominal pain. He improved after receiving fentanyl, nitroglycerin, aspirin and midazolam.  Workup in the ED reveals metabolic acidosis with elevated lactic acid and elevated ketones.  He believes his insulin pump was not working.  He does not recall a past history of DKA.  Feeling better after receiving IVF bolus and starting on insulin drip.    Principal Problem:    Diabetic ketoacidosis without coma associated with diabetes mellitus due to underlying condition (H)  Anion gap, ketones 4.01, VBG with pH 7.2  Appropriately fluid resuscitated in the ED  Started on insulin drip  Placed in ICU on DKA protocol  Serial monitoring of ketones, phosphorus, BMP  Order replacement protocols for phosphorus, magnesium, potassium  Labs improved. Anion gap resolved. Ketones resolved. Lactic acid normal  He felt much better the  following day.  Was able to eat breakfast.  Stopped insulin drip, restarted home insulin pump.  Goal was to have him on his own pump for couple hours prior to discharge, but he did not have all supplies.  He has a good understanding of diabetes management and is able to discharge to perform own self cares with diabetes using CGM and pump.    Active Problems:     Anion gap metabolic acidosis  From both lactic acidosis and ketoacidosis  Initial anion gap 28, normalized after initial resuscitation  Lactic acid 9, improved to 2.7 after 3L IVF, eventually 0.8 before discharge      Acquired hypothyroidism  Continue levothyroxine      Uncomplicated alcohol dependence (H)  Drinks a fair amount of liquor daily.  Last drink day prior to admission.  No history of withdrawal after avoiding alcohol for 2 days.  Placed on CIWA protocol with diazepam, but scores were 0-4 and did not require diazepam  Vitamins ordered  No indication for clonidine or gabapentin as he has no current withdrawal symptoms  Discussed harms of alcohol use as this contributed to lactic acidosis.  Additionally he is using zolpidem for sleep and we discussed the potential harms of alcohol use with sleep aid such as zolpidem.      Consultations This Hospital Stay   None    Code Status   Full Code    Time Spent on this Encounter   I, Jermaine Patterson MD, personally saw the patient today and spent greater than 30 minutes discharging this patient.       Jermaine Patterson MD  [facility]  [address]  [place] [address]  Phone: [phone]  Fax: [phone]  ______________________________________________________________________    Physical Exam   Vital Signs: Temp: 98.1  F (36.7  C) Temp src: Oral BP: 97/59 Pulse: 94   Resp: 21 SpO2: 100 % O2 Device: None (Room air)    Weight: 143 lbs 15.37 oz  General Appearance: Alert. No acute distress  Chest/Respiratory Exam: Clear to auscultation bilaterally  Cardiovascular Exam:  Regular rate and rhythm. S1, S2, no murmur, gallop, or rubs.  Gastrointestinal Exam: Soft, nontender  Extremities: No lower extremity edema.  Psychiatric: Normal affect and mentation         Primary Care Physician   Adebayo Dubois    Discharge Orders      Reason for your hospital stay    Diabetic ketoacidosis     Follow-up and recommended labs and tests     Follow up with primary care provider, Dr Dubois, within 7 days for hospital follow- up.     Activity    Your activity upon discharge: activity as tolerated     Diet    Follow this diet upon discharge: resume previous diet       Significant Results and Procedures   Most Recent 3 CBC's:  Recent Labs   Lab Test 05/26/25  1224 08/24/23  0242 02/03/22  1331 03/03/20  0456   WBC 14.1*  --  7.0 10.6   HGB 12.9* 12.8* 14.7 13.9   MCV 90  --  89 89     --  378 355     Most Recent 3 BMP's:  Recent Labs   Lab Test 05/27/25  0859 05/27/25  0657 05/27/25  0517 05/27/25  0318 05/27/25  0108 05/26/25  2118 05/26/25  2051   NA  --   --  141  --  137  --  135   POTASSIUM  --   --  4.0  --  4.4  --  4.2   CHLORIDE  --   --  110*  --  107  --  103   CO2  --   --  21*  --  21*  --  20*   BUN  --   --  15.9  --  19.1  --  20.0   CR  --   --  1.06  --  1.08  --  1.05   ANIONGAP  --   --  10  --  9  --  12   RYAN  --   --  8.4*  --  8.5*  --  8.3*   * 139* 80   < > 138*   < > 98    < > = values in this interval not displayed.     Most Recent 2 LFT's:  Recent Labs   Lab Test 05/26/25  1224 02/03/22  1331   AST 30 24   ALT 29 41   ALKPHOS 67 58   BILITOTAL 1.1 0.8   ,   Results for orders placed or performed during the hospital encounter of 05/26/25   XR Chest Port 1 View    Narrative    EXAM: XR CHEST PORT 1 VIEW  LOCATION: [facility]  DATE: 5/26/2025    INDICATION: chest pain  COMPARISON: 01/22/2014      Impression    IMPRESSION: Cardiac silhouette is borderline enlarged. Prominence of the pulmonary vasculature. No focal airspace opacity. No  pleural effusion or discernible pneumothorax.   CTA Chest Abdomen Pelvis w Contrast    Narrative    EXAM: CTA CHEST ABDOMEN PELVIS W CONTRAST  LOCATION: [facility]  DATE: 5/26/2025    INDICATION: concern for aortic disease, chest pain  COMPARISON: None.  TECHNIQUE: CT angiogram chest abdomen pelvis during arterial phase of injection of IV contrast. 2D and 3D MIP reconstructions were performed by the CT technologist. Dose reduction techniques were used.   CONTRAST: Isovue 370, 100mL    FINDINGS:   CT ANGIOGRAM CHEST, ABDOMEN, AND PELVIS: Normal caliber aorta. No aortic dissection. Great vessels in the mediastinum are widely patent. No central pulmonary embolus. The mesenteric, renal, and iliac arteries are all widely patent.    LUNGS AND PLEURA: Moderate emphysema. Calcified granuloma right upper lobe.    MEDIASTINUM/AXILLAE: Normal.    CORONARY ARTERY CALCIFICATION: None.    HEPATOBILIARY: Normal.    PANCREAS: Normal.    SPLEEN: Normal.    ADRENAL GLANDS: Normal.    KIDNEYS/BLADDER: Normal.    BOWEL: Normal.    LYMPH NODES: Normal.    PELVIC ORGANS: Mild prostate gland enlargement.    MUSCULOSKELETAL: Bilateral pars defects without significant anterolisthesis L5-S1. Degenerative changes in the spine. No acute fractures. Pectus excavatum.      Impression    IMPRESSION:  1.  No aortic dissection. No acute findings in the chest, abdomen, or pelvis.         Discharge Medications   Current Discharge Medication List        CONTINUE these medications which have NOT CHANGED    Details   aspirin 81 MG EC tablet Take 1 tablet (81 mg) by mouth daily with food  Qty: 90 tablet, Refills: 3    Associated Diagnoses: Type 1 diabetes mellitus without complication (H)      blood glucose (ACCU-CHEK GUIDE TEST) test strip USE   TO CHECK GLUCOSE TWICE DAILY  Qty: 100 strip, Refills: 1    Associated Diagnoses: Uncontrolled type 2 diabetes mellitus with hyperglycemia (H)      !! blood glucose (NO BRAND SPECIFIED)  lancets standard Dispense item covered by insurance. Test blood sugar 2 times daily.  Qty: 100 each, Refills: 11    Associated Diagnoses: Uncontrolled type 2 diabetes mellitus with hyperglycemia (H)      !! blood glucose (NO BRAND SPECIFIED) lancets standard Dispense item covered by insurance. Test blood sugar 2 times daily.  Qty: 100 each, Refills: 11    Associated Diagnoses: Uncontrolled type 2 diabetes mellitus with hyperglycemia (H)      blood glucose monitoring (NO BRAND SPECIFIED) meter device kit Dispense option covered by insurance. Test blood sugar 2 times daily.  Qty: 1 kit, Refills: 11    Associated Diagnoses: Uncontrolled type 2 diabetes mellitus with hyperglycemia (H)      buPROPion (WELLBUTRIN XL) 150 MG 24 hr tablet Take 1 tablet by mouth daily at 2 pm      chlorhexidine (HIBICLENS) 4 % solution Apply topically once a week  Qty: 473 mL, Refills: 5    Associated Diagnoses: Superficial foreign body of multiple sites, with infection; Uses self-applied continuous glucose monitoring device; Insulin pump in place      clotrimazole (LOTRIMIN) 1 % external cream Apply topically 2 times daily  Qty: 113 g, Refills: 11    Associated Diagnoses: Tinea pedis of both feet      Continuous Glucose Sensor (DEXCOM G6 SENSOR) MISC CHANGE EVERY 10 DAYS  Qty: 9 each, Refills: 11    Associated Diagnoses: Type 2 diabetes mellitus with hyperglycemia, with long-term current use of insulin (H)      Continuous Glucose Transmitter (DEXCOM G6 TRANSMITTER) MISC CHANGE EVERY THREE MONTHS  Qty: 1 each, Refills: 11    Associated Diagnoses: Type 2 diabetes mellitus with hyperglycemia, with long-term current use of insulin (H)      glucose (BD GLUCOSE) 4 g chewable tablet Take 1 tablet by mouth every hour as needed for low blood sugar  Qty: 30 tablet, Refills: 11    Associated Diagnoses: Uncontrolled type 2 diabetes mellitus with hyperglycemia (H)      insulin aspart (NOVOLOG VIAL) 100 UNITS/ML vial USE PER INSULIN PUMP: BASAL: 0.4  "UNIT/HOUR, BOLUS: ICR 1:15 GRAMS, ISF 1:100 MG/DL. MAX DAILY DOSE 200 UNITS  Qty: 50 mL, Refills: 11    Associated Diagnoses: Type 1 diabetes mellitus without complication (H)      Insulin Infusion Pump Supplies (AUTOSOFT 30 INFUSION SET) MISC CHANGE EVERY 3 DAYS  Qty: 30 each, Refills: 11    Associated Diagnoses: Type 2 diabetes mellitus with hyperglycemia, with long-term current use of insulin (H)      Insulin Infusion Pump Supplies (T:SLIM X2 3ML CARTRIDGE) MISC CHANGE EVERY 3 DAYS  Qty: 30 each, Refills: 11    Associated Diagnoses: Type 2 diabetes mellitus with hyperglycemia, with long-term current use of insulin (H)      insulin syringe-needle U-100 (31G X 5/16\" 1 ML) 31G X 5/16\" 1 ML miscellaneous Use 4 syringes daily or as directed.  Qty: 100 each, Refills: 1    Associated Diagnoses: Type 2 diabetes mellitus with hyperglycemia, with long-term current use of insulin (H)      IV Sets-Tubing (INFUSION SET 43\") MISC 43\" 13 mm auto soft 30 .Change every 2-3 days  Qty: 25 each, Refills: 3    Associated Diagnoses: Type 2 diabetes mellitus with hyperglycemia, with long-term current use of insulin (H)      levothyroxine (SYNTHROID/LEVOTHROID) 112 MCG tablet Take 1 tablet by mouth once daily  Qty: 90 tablet, Refills: 1    Associated Diagnoses: Acquired hypothyroidism      LORazepam (ATIVAN) 0.5 MG tablet TAKE 1 TABLET BY MOUTH 3 TIMES A WEEK AS NEEDED FOR ANXIETY/PANIC ATTACKS      meloxicam (MOBIC) 7.5 MG tablet Take 1-2 tablets (7.5-15 mg) by mouth nightly as needed for pain.  Qty: 180 tablet, Refills: 3    Associated Diagnoses: Herniated lumbar intervertebral disc      mupirocin (BACTROBAN) 2 % external ointment Apply topically 3 times daily as needed (wound)  Qty: 30 g, Refills: 3    Associated Diagnoses: Local infection of skin and subcutaneous tissue      polyethylene glycol (MIRALAX) 17 GM/Dose powder Take 17 g (1 Capful) by mouth daily  Qty: 850 g, Refills: 11    Associated Diagnoses: Chronic constipation    "   simvastatin (ZOCOR) 20 MG tablet Take 1 tablet (20 mg) by mouth at bedtime.  Qty: 90 tablet, Refills: 4    Associated Diagnoses: Type 1 diabetes mellitus without complication (H)      zolpidem (AMBIEN) 10 MG tablet Take 10 mg by mouth daily       !! - Potential duplicate medications found. Please discuss with provider.        Allergies   Allergies   Allergen Reactions    Lisinopril Dizziness    Metformin Nausea and Other (See Comments)     Abdominal cramping

## 2025-05-27 NOTE — PROGRESS NOTES
Insulin pump and supplies brought in by step mom. Equipment remains with patient, in ziploc bag at bed side.

## 2025-05-27 NOTE — PLAN OF CARE
"Temp: 98.6  F (37  C) Temp src: Temporal BP: 101/57 Pulse: 77   Resp: 17 SpO2: 97 % Height: 172.7 cm (5' 8\") Weight: 65.3 kg (143 lb 15.4 oz)  O2 Device: None (Room air)    Goal Outcome Evaluation:    Blood glucose within target range, BS checks q 2 hrs, insulin running at 1 unit/hr, algorithm 2. D5 1/2 NS +K infusing at 100 ml/hr. Pt denies pain, VSS, RA. Ambulates to bathroom with SBA. Adequate UO, clear, yellow, odorless. Phosphate replaced orally this shift, normal range at routine check. Anion gap closed, blood ketones cleared. Patient pleasant and cooperative, CIWA score of 3. No anxiolytics given this shift.                             "

## 2025-05-27 NOTE — PROGRESS NOTES
Insulin gtt restarted due to pt not having all of home insulin pump supplies. Pt called mother, who plans on bringing remaining supplies in. MD aware.   Romy Rome RN on 5/27/2025 at 8:45 AM

## 2025-05-27 NOTE — PHARMACY - DISCHARGE MEDICATION RECONCILIATION AND EDUCATION
Pharmacy:  Discharge Counseling and Medication Reconciliation    Rick Contreras  [address]   [place] [address]  [phone] (home)   55 year old male  PCP: Adebayo Dubois    Allergies: Lisinopril and Metformin    Discharge Counseling:    Pharmacist met with patient (and/or family) today to review the medication portion of the After Visit Summary (with an emphasis on NEW medications) and to address patient's questions/concerns.    Summary of Education: Did not meet with patient as there were no changes to medications. No concerns at this time.    Materials Provided:  MedCounselor sheets printed from Clinical Pharmacology on: N/A    Discharge Medication Reconciliation:    It has been determined that the patient has an adequate supply of medications available or which can be obtained from the patient's preferred pharmacy, which HE/SHE has confirmed as: N/A    Thank you for the consult.    Rachel Huddleston RPH........May 27, 2025 8:32 AM

## 2025-05-28 ENCOUNTER — PATIENT OUTREACH (OUTPATIENT)
Dept: PEDIATRICS | Facility: OTHER | Age: 55
End: 2025-05-28
Payer: COMMERCIAL

## 2025-05-28 LAB
ATRIAL RATE - MUSE: 93 BPM
DIASTOLIC BLOOD PRESSURE - MUSE: NORMAL MMHG
INTERPRETATION ECG - MUSE: NORMAL
P AXIS - MUSE: 109 DEGREES
PR INTERVAL - MUSE: 150 MS
QRS DURATION - MUSE: 104 MS
QT - MUSE: 366 MS
QTC - MUSE: 455 MS
R AXIS - MUSE: 237 DEGREES
SYSTOLIC BLOOD PRESSURE - MUSE: NORMAL MMHG
T AXIS - MUSE: 122 DEGREES
VENTRICULAR RATE- MUSE: 93 BPM

## 2025-05-28 NOTE — TELEPHONE ENCOUNTER
Transitions of Care Outreach  Chief Complaint   Patient presents with    Hospital F/U       Most Recent Admission Date: 5/26/2025   Most Recent Admission Diagnosis: DKA (diabetic ketoacidosis) (H) - E11.10  Alcohol use - F10.90  Type 1 diabetes mellitus without complication (H) - E10.9  Diabetic ketoacidosis without coma associated with diabetes mellitus due to underlying condition (H) - E08.10     Most Recent Discharge Date: 5/27/2025   Most Recent Discharge Diagnosis: Diabetic ketoacidosis without coma associated with diabetes mellitus due to underlying condition (H) - E08.10  Type 1 diabetes mellitus without complication (H) - E10.9  Alcohol use - F10.90  Type 1 diabetes mellitus with ketoacidosis without coma (H) - E10.10  Insulin pump status - Z96.41     Transitions of Care Assessment    Discharge Assessment  How are you doing now that you are home?: pretty good.  How are your symptoms? (Red Flag symptoms escalate to triage hotline per guidelines): Improved  Do you know how to contact your clinic care team if you have future questions or changes to your health status? : Yes  Does the patient have their discharge instructions? : Yes  Does the patient have questions regarding their discharge instructions? : Yes (see comment)  Were you started on any new medications or were there changes to any of your previous medications? : No  Does the patient have all of their medications?: Yes  Do you have questions regarding any of your medications? : No  Do you have all of your needed medical supplies or equipment (DME)?  (i.e. oxygen tank, CPAP, cane, etc.): Yes    Follow up Plan     Discharge Follow-Up  Discharge follow up appointment scheduled in alignment with recommended follow up timeframe or Transitions of Risk Category? (Low = within 30 days; Moderate= within 14 days; High= within 7 days): Yes  Discharge Follow Up Appointment Date: 06/05/25  Discharge Follow Up Appointment Scheduled with?: Primary Care  Provider    Future Appointments   Date Time Provider Department Center   6/5/2025 10:40 AM Adebayo Dubois MD [facility]       Outpatient Plan as outlined on AVS reviewed with patient.    For any urgent concerns, please contact our 24 hour nurse triage line: [phone] ([phone])       Nasreen Rivas RN

## 2025-06-05 ENCOUNTER — OFFICE VISIT (OUTPATIENT)
Dept: PEDIATRICS | Facility: OTHER | Age: 55
End: 2025-06-05
Attending: INTERNAL MEDICINE
Payer: COMMERCIAL

## 2025-06-05 VITALS
TEMPERATURE: 97.6 F | DIASTOLIC BLOOD PRESSURE: 78 MMHG | BODY MASS INDEX: 21.37 KG/M2 | WEIGHT: 141 LBS | OXYGEN SATURATION: 100 % | RESPIRATION RATE: 18 BRPM | HEIGHT: 68 IN | HEART RATE: 58 BPM | SYSTOLIC BLOOD PRESSURE: 130 MMHG

## 2025-06-05 DIAGNOSIS — E10.65 UNCONTROLLED TYPE 1 DIABETES MELLITUS WITH HYPERGLYCEMIA (H): ICD-10-CM

## 2025-06-05 DIAGNOSIS — E10.10 DIABETIC KETOACIDOSIS WITHOUT COMA ASSOCIATED WITH TYPE 1 DIABETES MELLITUS (H): ICD-10-CM

## 2025-06-05 DIAGNOSIS — K21.9 GASTROESOPHAGEAL REFLUX DISEASE WITHOUT ESOPHAGITIS: ICD-10-CM

## 2025-06-05 DIAGNOSIS — F10.90 ALCOHOL USE DISORDER: ICD-10-CM

## 2025-06-05 DIAGNOSIS — Z09 HOSPITAL DISCHARGE FOLLOW-UP: Primary | ICD-10-CM

## 2025-06-05 PROCEDURE — 99495 TRANSJ CARE MGMT MOD F2F 14D: CPT | Performed by: INTERNAL MEDICINE

## 2025-06-05 PROCEDURE — G0463 HOSPITAL OUTPT CLINIC VISIT: HCPCS

## 2025-06-05 RX ORDER — FAMOTIDINE 20 MG/1
20 TABLET, FILM COATED ORAL 2 TIMES DAILY PRN
Qty: 180 TABLET | Refills: 3 | Status: SHIPPED | OUTPATIENT
Start: 2025-06-05

## 2025-06-05 RX ORDER — OMEPRAZOLE 20 MG/1
20 CAPSULE, DELAYED RELEASE ORAL DAILY
Qty: 90 CAPSULE | Refills: 3 | Status: SHIPPED | OUTPATIENT
Start: 2025-06-05

## 2025-06-05 ASSESSMENT — ANXIETY QUESTIONNAIRES
7. FEELING AFRAID AS IF SOMETHING AWFUL MIGHT HAPPEN: SEVERAL DAYS
GAD7 TOTAL SCORE: 7
GAD7 TOTAL SCORE: 7
1. FEELING NERVOUS, ANXIOUS, OR ON EDGE: SEVERAL DAYS
GAD7 TOTAL SCORE: 7
8. IF YOU CHECKED OFF ANY PROBLEMS, HOW DIFFICULT HAVE THESE MADE IT FOR YOU TO DO YOUR WORK, TAKE CARE OF THINGS AT HOME, OR GET ALONG WITH OTHER PEOPLE?: VERY DIFFICULT
7. FEELING AFRAID AS IF SOMETHING AWFUL MIGHT HAPPEN: SEVERAL DAYS
IF YOU CHECKED OFF ANY PROBLEMS ON THIS QUESTIONNAIRE, HOW DIFFICULT HAVE THESE PROBLEMS MADE IT FOR YOU TO DO YOUR WORK, TAKE CARE OF THINGS AT HOME, OR GET ALONG WITH OTHER PEOPLE: VERY DIFFICULT
2. NOT BEING ABLE TO STOP OR CONTROL WORRYING: SEVERAL DAYS
3. WORRYING TOO MUCH ABOUT DIFFERENT THINGS: SEVERAL DAYS
6. BECOMING EASILY ANNOYED OR IRRITABLE: SEVERAL DAYS
4. TROUBLE RELAXING: SEVERAL DAYS
5. BEING SO RESTLESS THAT IT IS HARD TO SIT STILL: SEVERAL DAYS

## 2025-06-05 ASSESSMENT — PAIN SCALES - PAIN ENJOYMENT GENERAL ACTIVITY SCALE (PEG)
INTERFERED_GENERAL_ACTIVITY: 6
AVG_PAIN_PASTWEEK: 6
INTERFERED_ENJOYMENT_LIFE: 7
INTERFERED_ENJOYMENT_LIFE: 7
AVG_PAIN_PASTWEEK: 6
PEG_TOTALSCORE: 6.33
PEG_TOTALSCORE: 6.33
INTERFERED_GENERAL_ACTIVITY: 6

## 2025-06-05 ASSESSMENT — PAIN SCALES - GENERAL: PAINLEVEL_OUTOF10: SEVERE PAIN (7)

## 2025-06-05 ASSESSMENT — PATIENT HEALTH QUESTIONNAIRE - PHQ9
SUM OF ALL RESPONSES TO PHQ QUESTIONS 1-9: 4
10. IF YOU CHECKED OFF ANY PROBLEMS, HOW DIFFICULT HAVE THESE PROBLEMS MADE IT FOR YOU TO DO YOUR WORK, TAKE CARE OF THINGS AT HOME, OR GET ALONG WITH OTHER PEOPLE: VERY DIFFICULT
SUM OF ALL RESPONSES TO PHQ QUESTIONS 1-9: 4

## 2025-06-05 NOTE — PROGRESS NOTES
Assessment & Plan   1. Hospital discharge follow-up (Primary)  2. Diabetic ketoacidosis without coma associated with type 1 diabetes mellitus (H)  3. Uncontrolled type 1 diabetes mellitus with hyperglycemia (H)  Rick Contreras is using their CGM and is benefiting from it.   I personally reviewed more than 72 hours of CGM data for Mr. Rick Contreras 6/5/2025.   Over the last 14 days he is 50% in range with 34% high and 16% very high.  He tells me he turned his CGM to vibrate and he just wakes up every 2 hours at night to look at it.  He does have low normal blood sugars throughout the night when he sleeping but no significant hypoglycemia that I can see.  Primarily he is just running very high throughout the day.  He is not doing his insulin to carb ratios or his correction dosing because he cannot cognitively do that.  His basal rate is 0.5 units/h.  He sometimes gives himself 5 units if he eats.  I asked him to give himself units more consistently when he eats and to consider increasing the amount he gives to 7 or 8 units.  I would like him to meet with an endocrinologist but he is not willing to do that.  In the absence of that I like him to get back into Michelle's office soon.    Recommendations:  Based on the patterns and trends, the following recommendations are made today:   -- Medication changes, if applicable outlined below.   -- Close follow-up is recommended for in person follow-up and review, with ongoing therapy adjustments.      - Adult Diabetes Education [facility] Referral; Future    2. Gastroesophageal reflux disease without esophagitis  He is having symptoms consistent with heartburn.  Recommend treatment as outlined below.  Low threshold for EGD.  - omeprazole (PRILOSEC) 20 MG DR capsule; Take 1 capsule (20 mg) by mouth daily.  Dispense: 90 capsule; Refill: 3  - famotidine (PEPCID) 20 MG tablet; Take 1 tablet (20 mg) by mouth 2 times daily as needed (GERD).  Dispense: 180 tablet; Refill: 3    5.  Alcohol use disorder  Recommend alcohol cessation        Mr. Contreras was recently hospitalized for DKA, appears to be doing well since discharge.  Discharge summary and recommendations for outpatient provider are reviewed. Based on what occurred in the visit today:  Previous medication(s) were discontinued or altered? Yes  Previous medication(s) were suspended pending consultation? No  New medication(s) started? Yes     -- Medication reconciliation and management was performed today    Patient Instructions    -- Meet with Michelle   -- Give yourself more insulin at mealtimes     -- Start omeprazole (Prilosec) 20 mg daily   -- Use famotidine (Pepcid) 20 mg twice daily as needed for heart burn, upset stomach.   -- Antacids are okay to use as well as needed (eg Tums)   -- Avoid trigger foods (eg spicy foods, caffeine, alcohol -- see longer list below)   -- Larger meal earlier in the day, smallest meal later in the day   -- Avoid NSAIDs (eg ibuprofen/Advil, naproxen/Aleve)   -- Work on healthy weight   -- Reduce stress in your life   -- After your symptoms have improved (around 30 days) consider stopping omeprazole   -- Okay to continue famotidine as needed   -- If symptoms persist or worsen, return as we would consider obtaining endoscopy    Patient Education       Patient Education  Lifestyle Changes for Controlling GERD  When you have GERD, stomach acid feels as if it s backing up toward your mouth. Whether or not you take medicine to control your GERD, your symptoms can often be improved with lifestyle changes. Talk to your healthcare provider about the following suggestions. These suggestions may help you get relief from your symptoms.      Raise your head  Reflux is more likely to strike when you re lying down flat, because stomach fluid can flow backward more easily. Raising the head of your bed 4 to 6 inches can help. To do this:  Slide blocks or books under the legs at the head of your bed. Or, place a wedge  "under the mattress. Many foam stores can make a suitable wedge for you. The wedge should run from your waist to the top of your head.  Don t just prop your head on several pillows. This increases pressure on your stomach. It can make GERD worse.  Watch your eating habits  Certain foods may increase the acid in your stomach or relax the lower esophageal sphincter. This makes GERD more likely. It s best to avoid the following if they cause you symptoms:  Coffee, tea, and carbonated drinks (with and without caffeine)  Fatty, fried, or spicy food  Mint, chocolate, onions, and tomatoes  Peppermint  Any other foods that seem to irritate your stomach or cause you pain  Relieve the pressure  Tips include the following:  Eat smaller meals, even if you have to eat more often.  Don t lie down right after you eat. Wait a few hours for your stomach to empty.  Avoid tight belts and tight-fitting clothes.  Lose excess weight.  Tobacco and alcohol  Avoid smoking tobacco and drinking alcohol. They can make GERD symptoms worse.  Date Last Reviewed: 7/1/2016 2000-2017 [employer]. [address] [place] [address]. All rights reserved. This information is not intended as a substitute for professional medical care. Always follow your healthcare professional's instructions.        Return in about 2 months (around 8/5/2025) for medicare wellness visit.    SignedAdebayo MD, FAAP, FACP  Internal Medicine & Pediatrics    Subjective     Rick Contreras is a 55 year old male who presents for hospital discharge follow-up.    Hospital: [facility]  Date of discharge: 5/27/25  Date of post discharge contact: 5/28/25    Objective   Vitals: /78 (BP Location: Right arm, Patient Position: Sitting, Cuff Size: Adult Regular)   Pulse 58   Temp 97.6  F (36.4  C) (Tympanic)   Resp 18   Ht 1.727 m (5' 8\")   Wt 64 kg (141 lb)   SpO2 100%   BMI 21.44 kg/m        Review and Analysis of Data   I personally reviewed the " following:  External notes: Yes dexcom clarity  Results: Yes hospital lab data  Use of an independent historian: No  Independent review of a test performed by another physician: No  Discussion of management with another physician: No  Moderate risk of morbidity from additional diagnostic testing and/or treatment.    Billing:   Type of Medical Decision Making Face-to-Face Visit within 7 Days Face-to-Face Visit within 14 days   Moderate Complexity [id number]   High Complexity [id number]

## 2025-06-05 NOTE — NURSING NOTE
"Chief Complaint   Patient presents with    Hospital F/U     GI 05/26/25 thru 05/27/25 DM Ketoacidosis w/o coma       Initial /78 (BP Location: Right arm, Patient Position: Sitting, Cuff Size: Adult Regular)   Pulse 58   Temp 97.6  F (36.4  C) (Tympanic)   Resp 18   Ht 1.727 m (5' 8\")   Wt 64 kg (141 lb)   SpO2 100%   BMI 21.44 kg/m   Estimated body mass index is 21.44 kg/m  as calculated from the following:    Height as of this encounter: 1.727 m (5' 8\").    Weight as of this encounter: 64 kg (141 lb).  Medication Review: complete    The next two questions are to help us understand your food security.  If you are feeling you need any assistance in this area, we have resources available to support you today.          5/26/2025   SDOH- Food Insecurity   Within the past 12 months, did you worry that your food would run out before you got money to buy more? Y   Within the past 12 months, did the food you bought just not last and you didn t have money to get more? N         Health Care Directive:  Patient does not have a Health Care Directive: Discussed advance care planning with patient; however, patient declined at this time.    Rebekah Colon LPN      "

## 2025-06-05 NOTE — PATIENT INSTRUCTIONS
-- Meet with Michelle   -- Give yourself more insulin at mealtimes     -- Start omeprazole (Prilosec) 20 mg daily   -- Use famotidine (Pepcid) 20 mg twice daily as needed for heart burn, upset stomach.   -- Antacids are okay to use as well as needed (eg Tums)   -- Avoid trigger foods (eg spicy foods, caffeine, alcohol -- see longer list below)   -- Larger meal earlier in the day, smallest meal later in the day   -- Avoid NSAIDs (eg ibuprofen/Advil, naproxen/Aleve)   -- Work on healthy weight   -- Reduce stress in your life   -- After your symptoms have improved (around 30 days) consider stopping omeprazole   -- Okay to continue famotidine as needed   -- If symptoms persist or worsen, return as we would consider obtaining endoscopy    Patient Education        Patient Education   Lifestyle Changes for Controlling GERD  When you have GERD, stomach acid feels as if it s backing up toward your mouth. Whether or not you take medicine to control your GERD, your symptoms can often be improved with lifestyle changes. Talk to your healthcare provider about the following suggestions. These suggestions may help you get relief from your symptoms.      Raise your head  Reflux is more likely to strike when you re lying down flat, because stomach fluid can flow backward more easily. Raising the head of your bed 4 to 6 inches can help. To do this:  Slide blocks or books under the legs at the head of your bed. Or, place a wedge under the mattress. Many foam stores can make a suitable wedge for you. The wedge should run from your waist to the top of your head.  Don t just prop your head on several pillows. This increases pressure on your stomach. It can make GERD worse.  Watch your eating habits  Certain foods may increase the acid in your stomach or relax the lower esophageal sphincter. This makes GERD more likely. It s best to avoid the following if they cause you symptoms:  Coffee, tea, and carbonated drinks (with and without  caffeine)  Fatty, fried, or spicy food  Mint, chocolate, onions, and tomatoes  Peppermint  Any other foods that seem to irritate your stomach or cause you pain  Relieve the pressure  Tips include the following:  Eat smaller meals, even if you have to eat more often.  Don t lie down right after you eat. Wait a few hours for your stomach to empty.  Avoid tight belts and tight-fitting clothes.  Lose excess weight.  Tobacco and alcohol  Avoid smoking tobacco and drinking alcohol. They can make GERD symptoms worse.  Date Last Reviewed: 7/1/2016 2000-2017 [employer]. [address] [place] [address], [place] [address]. All rights reserved. This information is not intended as a substitute for professional medical care. Always follow your healthcare professional's instructions.

## 2025-06-23 DIAGNOSIS — Z79.4 TYPE 2 DIABETES MELLITUS WITH HYPERGLYCEMIA, WITH LONG-TERM CURRENT USE OF INSULIN (H): ICD-10-CM

## 2025-06-23 DIAGNOSIS — E11.65 TYPE 2 DIABETES MELLITUS WITH HYPERGLYCEMIA, WITH LONG-TERM CURRENT USE OF INSULIN (H): ICD-10-CM

## 2025-06-23 NOTE — TELEPHONE ENCOUNTER
Bed: RT05  Expected date:   Expected time:   Means of arrival:   Comments:  Charge     Note from pharmacy:  Written for lancet kit. Please send Rx for lancets for Medicare billing. Thanks!    blood glucose (NO BRAND SPECIFIED) lancets standard 100 each 11 11/14/2022  --   Sig: Dispense item covered by insurance. Test blood sugar 2 times daily.   Sent to pharmacy as: Lancets Misc. Kit (NO BRAND SPECIFIED)     Ascencion'd up. Unsure if it will send without Lancet kit attached.    Rebekah Ramirez RN .............. 11/15/2022  11:03 AM

## 2025-06-25 RX ORDER — PROCHLORPERAZINE 25 MG/1
SUPPOSITORY RECTAL
Qty: 1 EACH | Refills: 11 | Status: SHIPPED | OUTPATIENT
Start: 2025-06-25

## 2025-06-25 NOTE — TELEPHONE ENCOUNTER
[facility] sent Rx request for the following:      Requested Prescriptions   Pending Prescriptions Disp Refills    Continuous Glucose Transmitter (DEXCOM G6 TRANSMITTER) MISC [Pharmacy Med Name: DEXCOM G6 TRANSMITTER  MISC] 1 each 11     Sig: CHANGE EVERY THREE MONTHS       Diabetic Supplies Protocol Passed - 6/25/2025  2:18 PM        Last Prescription Date:   5/7/24  Last Fill Qty/Refills:         1, R-11    Last Office Visit:              6/5/25   Future Office visit:           8/11/25    Prescription approved per [facility] Refill Protocol.    JERMAINE PULIDO RN on 6/25/2025 at 2:20 PM

## 2025-06-27 DIAGNOSIS — M51.26 HERNIATED LUMBAR INTERVERTEBRAL DISC: ICD-10-CM

## 2025-07-01 RX ORDER — MELOXICAM 7.5 MG/1
TABLET ORAL
Qty: 180 TABLET | Refills: 0 | Status: SHIPPED | OUTPATIENT
Start: 2025-07-01

## 2025-07-01 NOTE — TELEPHONE ENCOUNTER
[facility] sent Rx request for the following:      Requested Prescriptions   Pending Prescriptions Disp Refills    meloxicam (MOBIC) 7.5 MG tablet [Pharmacy Med Name: Meloxicam 7.5 MG Oral Tablet] 180 tablet 0     Sig: TAKE 1 TO 2 TABLETS BY MOUTH NIGHTLY AS NEEDED FOR PAIN       NSAID Medications Failed - 7/1/2025 12:30 PM        Failed - Normal CBC on file in past 12 months     Recent Labs   Lab Test 05/26/25  1224 03/12/18  0830 10/19/17  1051   WBC 14.1*   < >  --    GICHWBC  --   --  12.8*   RBC 4.31*   < >  --    GICHRBC  --   --  5.03   HGB 12.9*   < > 14.9   HCT 38.9*   < > 43.7      < > 333    < > = values in this interval not displayed.                 Failed - Medication is active on med list and the sig matches. RN to manually verify dose and sig if red X/fail.     If the protocol passes (green check), you do not need to verify med dose and sig.    A prescription matches if they are the same clinical intention.    For Example: once daily and every morning are the same.    The protocol can not identify upper and lower case letters as matching and will fail.     For Example: Take 1 tablet (50 mg) by mouth daily     TAKE 1 TABLET (50 MG) BY MOUTH DAILY    For all fails (red x), verify dose and sig.    If the refill does match what is on file, the RN can still proceed to approve the refill request.       If they do not match, route to the appropriate provider.             Failed - Always Fail Criteria - Chart Review Required     Validate Diagnosis. If the medication is requested for an acute flare of a chronic pain associated with a musculoskeletal or rheumatologic condition; okay to authorize if all other criteria are met. If not, then forward to provider for review.          Last Prescription Date:   8/27/24  Last Fill Qty/Refills:         180, R-3    Last Office Visit:              8/27/24   Future Office visit:             Next 5 appointments (look out 90 days)      Aug 11, 2025 3:40 PM  (Arrive by  3:25 PM)  Provider Visit with Adebayo Dubois MD  [facility] ([facility] [address]  [facility] [address]  [phone]           Routing refill request to provider for review/approval.     Nasreen Rivas RN on 7/1/2025 at 12:32 PM

## 2025-07-28 ENCOUNTER — PATIENT OUTREACH (OUTPATIENT)
Dept: CARE COORDINATION | Facility: CLINIC | Age: 55
End: 2025-07-28
Payer: COMMERCIAL

## 2025-08-29 DIAGNOSIS — E10.9 TYPE 1 DIABETES MELLITUS WITHOUT COMPLICATION (H): ICD-10-CM

## 2025-08-29 DIAGNOSIS — E03.9 ACQUIRED HYPOTHYROIDISM: ICD-10-CM

## 2025-09-02 RX ORDER — LEVOTHYROXINE SODIUM 112 UG/1
112 TABLET ORAL DAILY
Qty: 90 TABLET | Refills: 0 | Status: SHIPPED | OUTPATIENT
Start: 2025-09-02

## 2025-09-02 RX ORDER — SIMVASTATIN 20 MG
20 TABLET ORAL AT BEDTIME
Qty: 90 TABLET | Refills: 0 | Status: SHIPPED | OUTPATIENT
Start: 2025-09-02

## (undated) DEVICE — GLOVE BIOGEL PI INDICATOR 8.0 LF 41680

## (undated) DEVICE — SU VICRYL 2-0 CT-1 36" UND J945H

## (undated) DEVICE — DECANTER VIAL 2006S

## (undated) DEVICE — SU VICRYL 3-0 CT 27" J956H

## (undated) DEVICE — DRAPE LAP W/ARMBOARD 29410

## (undated) DEVICE — SUTURE 2-0 VICRYL TIES J911T

## (undated) DEVICE — ESU GROUND PAD ADULT W/CORD E7507

## (undated) DEVICE — BLADE CLIPPER 4406

## (undated) DEVICE — SOL WATER 1500ML

## (undated) DEVICE — DRSG STERI STRIP 1/2X4" R1547

## (undated) DEVICE — DRSG TEGADERM 8X12" 1629

## (undated) DEVICE — DRSG MEDIPORE 3 1/2X8" 3570

## (undated) DEVICE — PACK MAJOR LAPAROTOMY LF SBA15MLFCA

## (undated) DEVICE — BLADE KNIFE SURG 15 371115

## (undated) DEVICE — LIGHT HANDLES PLASTIC

## (undated) DEVICE — GLOVE PROTEXIS POWDER FREE SMT 8.0  2D72PT80X

## (undated) DEVICE — SLEEVE COMPRESSION SCD KNEE MED 74022

## (undated) DEVICE — SU VICRYL 0 CT-1 CR 8X18" J740D

## (undated) DEVICE — SU VICRYL 3-0 CT-1 36" J944H

## (undated) DEVICE — SUTURE 5-0 MAXON SMM5042

## (undated) DEVICE — SU VICRYL 0 CT-2 CR 8X18" J727D

## (undated) DEVICE — DRAIN PENROSE 1/2X12" SILICONE GR103

## (undated) DEVICE — PREP CHLORAPREP CLEAR 26ML APPLICATOR 260800

## (undated) RX ORDER — LIDOCAINE HYDROCHLORIDE 10 MG/ML
INJECTION, SOLUTION EPIDURAL; INFILTRATION; INTRACAUDAL; PERINEURAL
Status: DISPENSED
Start: 2019-08-06

## (undated) RX ORDER — LORAZEPAM 2 MG/ML
INJECTION INTRAMUSCULAR
Status: DISPENSED
Start: 2025-05-26

## (undated) RX ORDER — PROPOFOL 10 MG/ML
INJECTION, EMULSION INTRAVENOUS
Status: DISPENSED
Start: 2018-03-20

## (undated) RX ORDER — HYDROMORPHONE HYDROCHLORIDE 1 MG/ML
INJECTION, SOLUTION INTRAMUSCULAR; INTRAVENOUS; SUBCUTANEOUS
Status: DISPENSED
Start: 2025-05-26

## (undated) RX ORDER — KETAMINE HYDROCHLORIDE 50 MG/ML
INJECTION, SOLUTION INTRAMUSCULAR; INTRAVENOUS
Status: DISPENSED
Start: 2018-03-20

## (undated) RX ORDER — CEFAZOLIN SODIUM 2 G/100ML
INJECTION, SOLUTION INTRAVENOUS
Status: DISPENSED
Start: 2018-03-20

## (undated) RX ORDER — DEXAMETHASONE SODIUM PHOSPHATE 4 MG/ML
INJECTION, SOLUTION INTRA-ARTICULAR; INTRALESIONAL; INTRAMUSCULAR; INTRAVENOUS; SOFT TISSUE
Status: DISPENSED
Start: 2018-03-20

## (undated) RX ORDER — OXYCODONE AND ACETAMINOPHEN 5; 325 MG/1; MG/1
TABLET ORAL
Status: DISPENSED
Start: 2018-03-20

## (undated) RX ORDER — ACETAMINOPHEN 10 MG/ML
INJECTION, SOLUTION INTRAVENOUS
Status: DISPENSED
Start: 2018-03-20

## (undated) RX ORDER — SIMVASTATIN 10 MG
TABLET ORAL
Status: DISPENSED
Start: 2025-05-26

## (undated) RX ORDER — ONDANSETRON 2 MG/ML
INJECTION INTRAMUSCULAR; INTRAVENOUS
Status: DISPENSED
Start: 2018-03-20

## (undated) RX ORDER — LIDOCAINE HYDROCHLORIDE 20 MG/ML
INJECTION, SOLUTION EPIDURAL; INFILTRATION; INTRACAUDAL; PERINEURAL
Status: DISPENSED
Start: 2018-03-20

## (undated) RX ORDER — BUPIVACAINE HYDROCHLORIDE 2.5 MG/ML
INJECTION, SOLUTION EPIDURAL; INFILTRATION; INTRACAUDAL
Status: DISPENSED
Start: 2018-03-20

## (undated) RX ORDER — FENTANYL CITRATE 50 UG/ML
INJECTION, SOLUTION INTRAMUSCULAR; INTRAVENOUS
Status: DISPENSED
Start: 2018-03-20

## (undated) RX ORDER — MECLIZINE HCL 12.5 MG 12.5 MG/1
TABLET ORAL
Status: DISPENSED
Start: 2020-03-03

## (undated) RX ORDER — ONDANSETRON 2 MG/ML
INJECTION INTRAMUSCULAR; INTRAVENOUS
Status: DISPENSED
Start: 2025-05-26

## (undated) RX ORDER — SUCRALFATE 1 G/1
TABLET ORAL
Status: DISPENSED
Start: 2025-05-26

## (undated) RX ORDER — NEOMYCIN/BACITRACIN/POLYMYXINB 3.5-400-5K
OINTMENT (GRAM) TOPICAL
Status: DISPENSED
Start: 2019-08-06

## (undated) RX ORDER — SODIUM CHLORIDE AND POTASSIUM CHLORIDE 150; 450 MG/100ML; MG/100ML
INJECTION, SOLUTION INTRAVENOUS
Status: DISPENSED
Start: 2025-05-26